# Patient Record
Sex: FEMALE | Race: WHITE | NOT HISPANIC OR LATINO | Employment: FULL TIME | URBAN - METROPOLITAN AREA
[De-identification: names, ages, dates, MRNs, and addresses within clinical notes are randomized per-mention and may not be internally consistent; named-entity substitution may affect disease eponyms.]

---

## 2017-05-16 ENCOUNTER — OFFICE VISIT (OUTPATIENT)
Dept: OBSTETRICS AND GYNECOLOGY | Facility: CLINIC | Age: 32
End: 2017-05-16
Payer: COMMERCIAL

## 2017-05-16 ENCOUNTER — PATIENT MESSAGE (OUTPATIENT)
Dept: OBSTETRICS AND GYNECOLOGY | Facility: CLINIC | Age: 32
End: 2017-05-16

## 2017-05-16 VITALS — WEIGHT: 128.75 LBS | SYSTOLIC BLOOD PRESSURE: 114 MMHG | BODY MASS INDEX: 22.1 KG/M2 | DIASTOLIC BLOOD PRESSURE: 70 MMHG

## 2017-05-16 DIAGNOSIS — Z34.00 GRAVIDA 1, CURRENTLY PREGNANT: ICD-10-CM

## 2017-05-16 DIAGNOSIS — Z3A.01 5 WEEKS GESTATION OF PREGNANCY: ICD-10-CM

## 2017-05-16 DIAGNOSIS — N92.6 MISSED PERIOD: Primary | ICD-10-CM

## 2017-05-16 DIAGNOSIS — Z32.01 POSITIVE URINE PREGNANCY TEST: ICD-10-CM

## 2017-05-16 LAB
B-HCG UR QL: POSITIVE
CTP QC/QA: YES

## 2017-05-16 PROCEDURE — 81025 URINE PREGNANCY TEST: CPT | Mod: QW,S$GLB,, | Performed by: NURSE PRACTITIONER

## 2017-05-16 PROCEDURE — 1160F RVW MEDS BY RX/DR IN RCRD: CPT | Mod: S$GLB,,, | Performed by: NURSE PRACTITIONER

## 2017-05-16 PROCEDURE — 99999 PR PBB SHADOW E&M-EST. PATIENT-LVL II: CPT | Mod: PBBFAC,,, | Performed by: NURSE PRACTITIONER

## 2017-05-16 PROCEDURE — 99213 OFFICE O/P EST LOW 20 MIN: CPT | Mod: S$GLB,,, | Performed by: NURSE PRACTITIONER

## 2017-05-16 NOTE — MR AVS SNAPSHOT
[facility]  [address], [address]  [facility] [address]  Phone: [phone]  Fax: [phone]                  Nallely Liang   2017 2:40 PM   Office Visit    Description:  Female : 1985   Provider:  Dorothea Lieberman NP   Department[facility]  [facility]           Reason for Visit     Absent Menses           Diagnoses this Visit        Comments    Missed period    -  Primary     Positive urine pregnancy test          1, currently pregnant         5 weeks gestation of pregnancy                To Do List           Future Appointments        Provider Department Dept Phone    2017 3:00 PM Talon Turcios Jr., MD [facility] [phone]    2017 2:30 PM [facility], WOMEN'S ULTRASOUND [facility] [phone]    2017 3:00 PM Lyssa Martinez MD [facility] [phone]      Goals (5 Years of Data)     None      Follow-Up and Disposition     Return for Dating US and OBMD.    Follow-up and Disposition History      [facility] On Call     [facility] On Call Nurse Care Line -  Assistance  Unless otherwise directed by your provider, please contact [facility] On-Call, our nurse care line that is available for  assistance.     Registered nurses in the [facility] On Call Center provide: appointment scheduling, clinical advisement, health education, and other advisory services.  Call: [phone] (toll free)               Medications           Message regarding Medications     Verify the changes and/or additions to your medication regime listed below are the same as discussed with your clinician today.  If any of these changes or additions are incorrect, please notify your healthcare provider.             Verify that the below list of medications is an accurate representation of the medications you are currently taking.  If none reported, the list may be blank. If incorrect, please contact your healthcare provider. Carry  this list with you in case of emergency.                Clinical Reference Information           Your Vitals Were     BP Weight Last Period BMI       114/70 58.4 kg (128 lb 12 oz) 2017 22.1 kg/m2       Blood Pressure          Most Recent Value    BP  114/70      Allergies as of 2017     No Known Allergies      Immunizations Administered on Date of Encounter - 2017     None      Orders Placed During Today's Visit      Normal Orders This Visit    POCT urine pregnancy          2017  2:53 PM - Ronda Aparicio MA      Component Results     Component Value Flag Ref Range Units Status    POC Preg Test, Ur Positive (A) Negative  Final     Acceptable Yes    Final            Instructions    Topic  General Pregnancy Information Recommended   (Unless Otherwise Contraindicated Or Restrictions Given To You By Your OB Doctor)      1. Anticipated course of prenatal care       Visits: will be Every 4 wks until 28 weeks, then every 2 weeks until 36 weeks, and then weekly until delivery.    Urine will be collected at each Obstetric visit        2. Nutrition and weight gain     Daily pre-eddie vitamin (recommend taking at night)    Additional 300 calories needed daily   No Sushi, hotdogs, unpasteurized products (milk/cheeses). No large fish such as: shark, jeremy mackerel, tile, sword fish    Incorporate 12 ounces of smaller seafood/week and no more than 6oz of albacore tuna    Caffeine: 200 mg/day or 2 cups of caffeine/day    Weight gain recommendations are based off of BMI before pregnancy. Generally patients who with normal weight prior pregnancy it is recommended 25-35 pounds of weight gain during the pregnancy with an estimated weekly gain of 1 pound/wk in 2nd and 3rd Trimester.    3. Toxoplasmosis precautions   If cats are in the home avoid changing litter boxes and if you need to change the litter box recommended you use gloves   4. Sexual Activity   Sexual activity is okay unless  you are put on restrictions by your provider. I recommend urinating after intercourse    5. Exercise   Generally pre-pregnancy routine is okay to continue    Drink plenty fluids for hydration    Stop any activity that causes heavy cramping like a period or bright red bleeding and contact your provider   No extreme or contact sports    No exercise on your back for an extended period of time after 20 weeks    6. Hot Tub/Saunas   Avoid hot tubes and saunas    7. Hair Treatments   Because of the lack of scientific studies on the effects of chemical treatments on your hair, we must advise that you do it at your own risk. If you choose to treat your hair, we recommend that you wait until after 12 weeks gestation. At this time there is no reason to believe that normal hair treatment is associated with onsequences to the baby.    8. Vaccines   Influenza vaccine is recommended by CDC during flu season    Tdap (pertussis or whopping cough) recommended each pregnancy between 27 and 36 weeks    Tdap booster recommended for family and other planned direct caregivers    9. Water   Water is an important nutrient in a good diet. However, it cannot be stressed enough that during pregnancy water is essential. The body has increased circulation through blood vessels, and without a large increase in water, pregnant women will be dehydrated. It plays an important role in decreasing constipation, preventing  contractions, decreasing swelling, and preventing dizziness. We recommend that you drink 8-10 glasses of water per day.    10. Smoking/Alcohol/Illicit Drug Use   No safe Level    Can lead to problems with pregnancy    Growth of the developing fetus     labor (delivery before 37 weeks)     rupture of the membranes (water breaking before 37 weeks)    Premature separation of the placenta (which may cause bleeding)    American College of Obstetricians and Gynecologists endorses abstinence    Can  lead to babies with disabilities    11. Environmental or work hazards   Unless otherwise restricted you may continue work throughout the pregnancy    Notify your provider of any work hazards or chemical exposure concerns   12. Travel     Safe to travel up to 35 weeks    Continue to wear a seatbelt and airbags are still recommended    Drink plenty fluids    Blood clots are a concern during pregnancy with long travel. Recommend compression stockings and moving around at least every 2 hours and staying hydrated.    13. Use of medications, vitamins, herbs, OTC drugs     Any medications not on the list provided to you from our clinic or given to you by one of our providers we recommend calling to make sure the medication is safe for you and baby.    14. Domestic Violence     Please notify office immediately of any concerns or violence so that we can help direct you to assistance needed    [place] [employer]: [phone]    15. Childbirth classes     List of Childbirth classes from [facility] is available    16. Selecting a Pediatrician   Selecting a pediatrician before delivery is recommended   You can interview pediatricians before delivery    17. Fetal Monitoring     A simple test of your babys well-being is a kick count. After 26 weeks, fetal motion of any kind should be monitored. Further discussion at that time   18.  Labor Signs     Water break, leaking fluids from Vagina prior 37 weeks   Regular contractions, Contractions that are more than 5-6/hour, getting stronger and painful with lower back pain, does not go away with rest and fluids    19. Postpartum Family Planning     Multiple options available from short term methods to long term reversible and irreversible methods    Discuss with provider as you get closer to delivery    20. Dental     It is recommended that you get an annual dental cleaning    21. Breastfeeding     Classes offered at [facility] and it  is recommended to take a class    22. Lifting  In 2013, the [employer] [place] [employer] Safety and Health (NIOSH) published clinical guidelines for occupational lifting in uncomplicated pregnancies. The recommended weight limits are based on gestational age, intermittent versus repetitive lifting, time (hours/day) spent lifting, and lifting height from floor and distance in 3 front of body. In this guideline, the maximum permissible weight for a woman less than 20 weeks of gestation performing infrequent lifting is 36 pounds (16 kgs) and the maximum permissible weight at ?20 weeks is 26 pounds (12 kgs). For repetitive lifting ?1 hour/day, the maximum weights in the first and second half of pregnancy are 18 pounds (8 kgs) and 13 pounds (6 kgs), respectively, and for repetitive lifting <1 hour/day, the maximum weights are 30 pounds (14 kgs) and 22 pounds (10 kgs), respectively. Although not based on high quality evidence, these guidelines are a reasonable reference for counseling pregnant women     23. Scheduling and Provider Availability     Your Obstetric Doctor is usually here weekly but not every day. We recommend you make 3-4 advanced appointments at a time to accommodate your personal needs and work/school obligations.    We ask that you come 15 minutes prior your scheduled appointment.    For same day appointments (not routine appointments) there is a Nurse Practitioner or another obstetric provider available. Please let the  aware you are an OB patient requesting a same day appointment.      24. Recommended Phone Dangelo     [facility]               Language Assistance Services     ATTENTION: Language assistance services are available, free of charge. Please call [phone].      ATENCIÓN: Si habla español, tiene a swanson disposición servicios gratuitos de asistencia lingüística. Llame al [phone].     CHÚ Ý: N?u b?n nói Ti?ng Vi?t, có các d?ch v? h? tr? ngôn ng? mi?n phí  dành cho b?n. G?i s? [phone].         [facility] complies with applicable Federal civil rights laws and does not discriminate on the basis of race, color, national origin, age, disability, or sex.

## 2017-05-16 NOTE — PATIENT INSTRUCTIONS
Topic  General Pregnancy Information Recommended   (Unless Otherwise Contraindicated Or Restrictions Given To You By Your OB Doctor)      1. Anticipated course of prenatal care       Visits: will be Every 4 wks until 28 weeks, then every 2 weeks until 36 weeks, and then weekly until delivery.    Urine will be collected at each Obstetric visit        2. Nutrition and weight gain     Daily pre-eddie vitamin (recommend taking at night)    Additional 300 calories needed daily   No Sushi, hotdogs, unpasteurized products (milk/cheeses). No large fish such as: shark, jeremy mackerel, tile, sword fish    Incorporate 12 ounces of smaller seafood/week and no more than 6oz of albacore tuna    Caffeine: 200 mg/day or 2 cups of caffeine/day    Weight gain recommendations are based off of BMI before pregnancy. Generally patients who with normal weight prior pregnancy it is recommended 25-35 pounds of weight gain during the pregnancy with an estimated weekly gain of 1 pound/wk in 2nd and 3rd Trimester.    3. Toxoplasmosis precautions   If cats are in the home avoid changing litter boxes and if you need to change the litter box recommended you use gloves   4. Sexual Activity   Sexual activity is okay unless you are put on restrictions by your provider. I recommend urinating after intercourse    5. Exercise   Generally pre-pregnancy routine is okay to continue    Drink plenty fluids for hydration    Stop any activity that causes heavy cramping like a period or bright red bleeding and contact your provider   No extreme or contact sports    No exercise on your back for an extended period of time after 20 weeks    6. Hot Tub/Saunas   Avoid hot tubes and saunas    7. Hair Treatments   Because of the lack of scientific studies on the effects of chemical treatments on your hair, we must advise that you do it at your own risk. If you choose to treat your hair, we recommend that you wait until after 12 weeks gestation. At  this time there is no reason to believe that normal hair treatment is associated with onsequences to the baby.    8. Vaccines   Influenza vaccine is recommended by CDC during flu season    Tdap (pertussis or whopping cough) recommended each pregnancy between 27 and 36 weeks    Tdap booster recommended for family and other planned direct caregivers    9. Water   Water is an important nutrient in a good diet. However, it cannot be stressed enough that during pregnancy water is essential. The body has increased circulation through blood vessels, and without a large increase in water, pregnant women will be dehydrated. It plays an important role in decreasing constipation, preventing  contractions, decreasing swelling, and preventing dizziness. We recommend that you drink 8-10 glasses of water per day.    10. Smoking/Alcohol/Illicit Drug Use   No safe Level    Can lead to problems with pregnancy    Growth of the developing fetus     labor (delivery before 37 weeks)     rupture of the membranes (water breaking before 37 weeks)    Premature separation of the placenta (which may cause bleeding)    American College of Obstetricians and Gynecologists endorses abstinence    Can lead to babies with disabilities    11. Environmental or work hazards   Unless otherwise restricted you may continue work throughout the pregnancy    Notify your provider of any work hazards or chemical exposure concerns   12. Travel     Safe to travel up to 35 weeks    Continue to wear a seatbelt and airbags are still recommended    Drink plenty fluids    Blood clots are a concern during pregnancy with long travel. Recommend compression stockings and moving around at least every 2 hours and staying hydrated.    13. Use of medications, vitamins, herbs, OTC drugs     Any medications not on the list provided to you from our clinic or given to you by one of our providers we recommend calling to make sure the  medication is safe for you and baby.    14. Domestic Violence     Please notify office immediately of any concerns or violence so that we can help direct you to assistance needed    [place] [employer]: [phone]    15. Childbirth classes     List of Childbirth classes from [facility] is available    16. Selecting a Pediatrician   Selecting a pediatrician before delivery is recommended   You can interview pediatricians before delivery    17. Fetal Monitoring     A simple test of your babys well-being is a kick count. After 26 weeks, fetal motion of any kind should be monitored. Further discussion at that time   18.  Labor Signs     Water break, leaking fluids from Vagina prior 37 weeks   Regular contractions, Contractions that are more than 5-6/hour, getting stronger and painful with lower back pain, does not go away with rest and fluids    19. Postpartum Family Planning     Multiple options available from short term methods to long term reversible and irreversible methods    Discuss with provider as you get closer to delivery    20. Dental     It is recommended that you get an annual dental cleaning    21. Breastfeeding     Classes offered at [facility] and it is recommended to take a class    22. Lifting  In 2013, the National [place] for Occupational Safety and Health (NIOSH) published clinical guidelines for occupational lifting in uncomplicated pregnancies. The recommended weight limits are based on gestational age, intermittent versus repetitive lifting, time (hours/day) spent lifting, and lifting height from floor and distance in 3 front of body. In this guideline, the maximum permissible weight for a woman less than 20 weeks of gestation performing infrequent lifting is 36 pounds (16 kgs) and the maximum permissible weight at ?20 weeks is 26 pounds (12 kgs). For repetitive lifting ?1 hour/day, the maximum weights in the first and second half of pregnancy are  18 pounds (8 kgs) and 13 pounds (6 kgs), respectively, and for repetitive lifting <1 hour/day, the maximum weights are 30 pounds (14 kgs) and 22 pounds (10 kgs), respectively. Although not based on high quality evidence, these guidelines are a reasonable reference for counseling pregnant women     23. Scheduling and Provider Availability     Your Obstetric Doctor is usually here weekly but not every day. We recommend you make 3-4 advanced appointments at a time to accommodate your personal needs and work/school obligations.    We ask that you come 15 minutes prior your scheduled appointment.    For same day appointments (not routine appointments) there is a Nurse Practitioner or another obstetric provider available. Please let the  aware you are an OB patient requesting a same day appointment.      24. Recommended Phone Dangelo     [place]    [facility]

## 2017-05-16 NOTE — PROGRESS NOTES
Chief Complaint: Missed Period    HPI: Nallely Liang is a 31 y.o., , reporting absence of menses with  LMP of 4.7.. Patient and partner have been attempting pregnancy. She currently denies any vaginal bleeding, leaking fluids, abnormal vaginal discharge,  or GI complaints. No fetal movement detected at this time. She is currently taking a daily prenatal vitamin and no other changes in medications reported at this time. No chronic medical history reported, denies ACOG recommended genetic screening history for FOB. + for MGM with SABs times 4    Infection History:  Denies TB exposure, STD history, rash since LMP, h/o HSV for pt or partner  Vaccine History:  Last Flu vaccine not UTD Last Tdap unsure Childhood Vaccines were UTD    History reviewed. No pertinent past medical history.  Past Surgical History:   Procedure Laterality Date    ANTERIOR CRUCIATE LIGAMENT REPAIR Left 2009    left acl replacement 2009     Social History   Substance Use Topics    Smoking status: Never Smoker    Smokeless tobacco: None    Alcohol use No      Comment: per week     Family History   Problem Relation Age of Onset    Miscarriages / Stillbirths Maternal Grandmother     Stroke Maternal Grandfather     Heart attack Maternal Grandfather     No Known Problems Father     No Known Problems Mother     No Known Problems Brother     No Known Problems Paternal Grandmother     Heart attack Paternal Grandfather     No Known Problems Brother     No Known Problems Brother     Amblyopia Neg Hx     Blindness Neg Hx     Cataracts Neg Hx     Glaucoma Neg Hx     Macular degeneration Neg Hx     Strabismus Neg Hx     Retinal detachment Neg Hx     Breast cancer Neg Hx     Colon cancer Neg Hx     Diabetes Neg Hx     Eclampsia Neg Hx     Hypertension Neg Hx     Ovarian cancer Neg Hx      labor Neg Hx      OB History    Para Term  AB SAB TAB Ectopic Multiple Living   1 0 0 0 0 0 0 0 0 0      #  Outcome Date GA Lbr Christophe/2nd Weight Sex Delivery Anes PTL Lv   1 Current               Obstetric Comments   Menarche ~ 14/[birth year]       /70  Wt 58.4 kg (128 lb 12 oz)  LMP 04/07/2017  BMI 22.1 kg/m2    ROS   Systemic: Not feeling tired (fatigue).  No fever chills   Gastrointestinal: No nausea, vomiting, no abdominal pain.  No diarrhea.  Genitourinary: No dysuria. No Pelvic Pain  Skin: No rash.      Physical Exam:   Vital Signs:° Normal.  General Appearance:° Well developed.  ° Well nourished.  Neurological:° No disorientation was observed.  Psychiatric: Affect: ° Normal.    Assessment/Plan  Confirmation of pregnancy--UPT in office positive, with pts LMP patient is approximately  5wks 4 days gestation with ONESIMO 1.12.18. Ordered dating Ultrasound and apt with OBMD. Start/Continue daily prenatal vitamin. Precautions given and s/s to report back to the office discussed with patient. First T ACOG education discussed today and handout provided. Zika precautions discussed.    2. Travel--discussed travel. She will talk with OBMD about travel plans    RTC prn as schedule with OBMD     ~25 minutes spent with pt Face to Face with >50% of visit spent on education/counseling

## 2017-05-17 ENCOUNTER — TELEPHONE (OUTPATIENT)
Dept: OBSTETRICS AND GYNECOLOGY | Facility: CLINIC | Age: 32
End: 2017-05-17

## 2017-05-17 DIAGNOSIS — N91.2 AMENORRHEA: Primary | ICD-10-CM

## 2017-05-23 ENCOUNTER — OFFICE VISIT (OUTPATIENT)
Dept: FAMILY MEDICINE | Facility: CLINIC | Age: 32
End: 2017-05-23
Attending: FAMILY MEDICINE
Payer: COMMERCIAL

## 2017-05-23 VITALS
WEIGHT: 129.5 LBS | HEIGHT: 64 IN | OXYGEN SATURATION: 99 % | HEART RATE: 78 BPM | DIASTOLIC BLOOD PRESSURE: 60 MMHG | SYSTOLIC BLOOD PRESSURE: 104 MMHG | BODY MASS INDEX: 22.11 KG/M2

## 2017-05-23 DIAGNOSIS — Z00.00 ROUTINE GENERAL MEDICAL EXAMINATION AT A HEALTH CARE FACILITY: Primary | ICD-10-CM

## 2017-05-23 DIAGNOSIS — Z00.00 LABORATORY EXAM ORDERED AS PART OF ROUTINE GENERAL MEDICAL EXAMINATION: ICD-10-CM

## 2017-05-23 DIAGNOSIS — Z34.90 INTRAUTERINE PREGNANCY: ICD-10-CM

## 2017-05-23 PROCEDURE — 99385 PREV VISIT NEW AGE 18-39: CPT | Mod: S$GLB,,, | Performed by: FAMILY MEDICINE

## 2017-05-23 PROCEDURE — 99999 PR PBB SHADOW E&M-EST. PATIENT-LVL III: CPT | Mod: PBBFAC,,, | Performed by: FAMILY MEDICINE

## 2017-05-23 NOTE — PROGRESS NOTES
Subjective:       Patient ID: Nallely Liang is a 31 y.o. female.    Chief Complaint: Annual Exam    HPI     The patient presents to the office today requesting a routine periodic health examination.  This is her first visit with me.  She states she is 6 weeks pregnant (LMP 4/7, EDC 1/12)    Patient Active Problem List   Diagnosis    Intrauterine pregnancy       Past Surgical History:   Procedure Laterality Date    ANTERIOR CRUCIATE LIGAMENT REPAIR Left 2009       No current outpatient prescriptions on file.    Review of patient's allergies indicates:  No Known Allergies    Family History   Problem Relation Age of Onset    Miscarriages / Stillbirths Maternal Grandmother     Stroke Maternal Grandfather     Heart attack Maternal Grandfather     No Known Problems Father     No Known Problems Mother     No Known Problems Brother     No Known Problems Paternal Grandmother     Heart attack Paternal Grandfather     No Known Problems Brother     No Known Problems Brother        Social History     Social History    Marital status:      Spouse name: N/A    Number of children: 0    Years of education: N/A     Occupational History    nutrition research      [employer]     Social History Main Topics    Smoking status: Never Smoker    Smokeless tobacco: Not on file    Alcohol use 2.4 oz/week     4 Standard drinks or equivalent per week    Drug use: No    Sexual activity: Yes     Partners: Male     Birth control/ protection: None     Other Topics Concern    Not on file     Social History Narrative    The patient does exercise regularly (BIW-TIW: dance classes, yoga).      She is satisfied with weight.    Rates diet as good.    She does not drink at least 1/2 gallon water daily.    She drinks 2 coffee/tea/caffeine-containing soft drinks daily.    Total sleep time at night is 7-9 hours.    She works 40-45 hours per week.    She does wear seat belts.    Hobbies include sialing.            OB History      Para Term  AB Living    1 0 0 0 0 0    SAB TAB Ectopic Multiple Live Births    0 0 0 0         Obstetric Comments    Menarche age 14.   Menses normal and regular.  History of abnormal PAP smear: NO.  History of sexually transmitted disease:  NO            Patient Care Team:  Talon Turcios Jr., MD as PCP - General (Family Medicine)    Review of Systems   Constitutional: Negative for activity change and unexpected weight change.   HENT: Negative for hearing loss, rhinorrhea and trouble swallowing.    Eyes: Negative for discharge and visual disturbance.   Respiratory: Negative for chest tightness and wheezing.    Cardiovascular: Negative for chest pain and palpitations.   Gastrointestinal: Negative for blood in stool, constipation, diarrhea and vomiting.   Endocrine: Negative for polydipsia and polyuria.   Genitourinary: Negative for difficulty urinating, dysuria, hematuria and menstrual problem.   Musculoskeletal: Negative for arthralgias and joint swelling.   Neurological: Negative for weakness and headaches.   Psychiatric/Behavioral: Negative for confusion and dysphoric mood.         Objective:      Physical Exam   Constitutional: She is oriented to person, place, and time. She appears well-developed and well-nourished. She is cooperative.   HENT:   Head: Normocephalic and atraumatic.   Nose: Nose normal.   Mouth/Throat: Oropharynx is clear and moist and mucous membranes are normal.   Eyes: Conjunctivae are normal. No scleral icterus.   Neck: Neck supple. No JVD present. Carotid bruit is not present. No thyromegaly present.   Cardiovascular: Normal rate, regular rhythm, normal heart sounds and normal pulses.  Exam reveals no gallop and no friction rub.    No murmur heard.  Pulmonary/Chest: Effort normal and breath sounds normal. She has no wheezes. She has no rhonchi. She has no rales.   Abdominal: Soft. Bowel sounds are normal. She exhibits no distension and no mass. There  "is no splenomegaly or hepatomegaly. There is no tenderness.   Musculoskeletal: Normal range of motion. She exhibits no edema or tenderness.   Lymphadenopathy:     She has no cervical adenopathy.     She has no axillary adenopathy.   Neurological: She is alert and oriented to person, place, and time. She has normal strength and normal reflexes. No cranial nerve deficit or sensory deficit.   Skin: Skin is warm and dry.   Psychiatric: She has a normal mood and affect. Her speech is normal.   Vitals reviewed.        Assessment:       1. Routine general medical examination at a health care facility    2. Intrauterine pregnancy    3. Laboratory exam ordered as part of routine general medical examination          Plan:       Labs (see Orders: will wait until first OB visit).  Discussed with patient the importance of lifestyle modifications, including well-balanced diet and moderate exercise regimen, in reducing risk for cardiovascular/cerebrovascular disease and diabetes.  Discussed routine examinations and screenings.  We will call the patient with results & make further recommendations at that time.      "This note will not be shared with the patient."  "

## 2017-05-31 ENCOUNTER — PROCEDURE VISIT (OUTPATIENT)
Dept: OBSTETRICS AND GYNECOLOGY | Facility: CLINIC | Age: 32
End: 2017-05-31
Payer: COMMERCIAL

## 2017-05-31 ENCOUNTER — INITIAL PRENATAL (OUTPATIENT)
Dept: OBSTETRICS AND GYNECOLOGY | Facility: CLINIC | Age: 32
End: 2017-05-31
Payer: COMMERCIAL

## 2017-05-31 ENCOUNTER — LAB VISIT (OUTPATIENT)
Dept: LAB | Facility: HOSPITAL | Age: 32
End: 2017-05-31
Attending: OBSTETRICS & GYNECOLOGY
Payer: COMMERCIAL

## 2017-05-31 VITALS — DIASTOLIC BLOOD PRESSURE: 62 MMHG | WEIGHT: 129 LBS | SYSTOLIC BLOOD PRESSURE: 106 MMHG | BODY MASS INDEX: 22.14 KG/M2

## 2017-05-31 DIAGNOSIS — Z36.89 ESTABLISH GESTATIONAL AGE, ULTRASOUND: ICD-10-CM

## 2017-05-31 DIAGNOSIS — Z00.00 LABORATORY EXAM ORDERED AS PART OF ROUTINE GENERAL MEDICAL EXAMINATION: ICD-10-CM

## 2017-05-31 DIAGNOSIS — Z34.91 NORMAL PREGNANCY, FIRST TRIMESTER: Primary | ICD-10-CM

## 2017-05-31 DIAGNOSIS — Z34.90 INTRAUTERINE PREGNANCY: ICD-10-CM

## 2017-05-31 DIAGNOSIS — Z3A.01 6 WEEKS GESTATION OF PREGNANCY: ICD-10-CM

## 2017-05-31 DIAGNOSIS — N91.2 AMENORRHEA: ICD-10-CM

## 2017-05-31 DIAGNOSIS — Z34.91 NORMAL PREGNANCY, FIRST TRIMESTER: ICD-10-CM

## 2017-05-31 LAB
ABO + RH BLD: NORMAL
ALBUMIN SERPL BCP-MCNC: 4.2 G/DL
ALP SERPL-CCNC: 37 U/L
ALT SERPL W/O P-5'-P-CCNC: 12 U/L
ANION GAP SERPL CALC-SCNC: 8 MMOL/L
AST SERPL-CCNC: 17 U/L
BASOPHILS # BLD AUTO: 0.04 K/UL
BASOPHILS NFR BLD: 0.5 %
BILIRUB SERPL-MCNC: 0.3 MG/DL
BLD GP AB SCN CELLS X3 SERPL QL: NORMAL
BUN SERPL-MCNC: 8 MG/DL
CALCIUM SERPL-MCNC: 9.5 MG/DL
CHLORIDE SERPL-SCNC: 106 MMOL/L
CO2 SERPL-SCNC: 23 MMOL/L
CREAT SERPL-MCNC: 0.8 MG/DL
DIFFERENTIAL METHOD: ABNORMAL
EOSINOPHIL # BLD AUTO: 0.1 K/UL
EOSINOPHIL NFR BLD: 0.7 %
ERYTHROCYTE [DISTWIDTH] IN BLOOD BY AUTOMATED COUNT: 13.5 %
EST. GFR  (AFRICAN AMERICAN): >60 ML/MIN/1.73 M^2
EST. GFR  (NON AFRICAN AMERICAN): >60 ML/MIN/1.73 M^2
GLUCOSE SERPL-MCNC: 77 MG/DL
HCT VFR BLD AUTO: 37.4 %
HGB BLD-MCNC: 12.8 G/DL
LYMPHOCYTES # BLD AUTO: 1.7 K/UL
LYMPHOCYTES NFR BLD: 19.9 %
MCH RBC QN AUTO: 28.1 PG
MCHC RBC AUTO-ENTMCNC: 34.2 %
MCV RBC AUTO: 82 FL
MONOCYTES # BLD AUTO: 0.5 K/UL
MONOCYTES NFR BLD: 5.7 %
NEUTROPHILS # BLD AUTO: 6.3 K/UL
NEUTROPHILS NFR BLD: 73.1 %
PLATELET # BLD AUTO: 269 K/UL
PMV BLD AUTO: 11 FL
POTASSIUM SERPL-SCNC: 3.9 MMOL/L
PROT SERPL-MCNC: 7.6 G/DL
RBC # BLD AUTO: 4.56 M/UL
RH BLD: NORMAL
SODIUM SERPL-SCNC: 137 MMOL/L
T4 FREE SERPL-MCNC: 0.9 NG/DL
TSH SERPL DL<=0.005 MIU/L-ACNC: 2.2 UIU/ML
WBC # BLD AUTO: 8.58 K/UL

## 2017-05-31 PROCEDURE — 80053 COMPREHEN METABOLIC PANEL: CPT

## 2017-05-31 PROCEDURE — 85025 COMPLETE CBC W/AUTO DIFF WBC: CPT

## 2017-05-31 PROCEDURE — 0500F INITIAL PRENATAL CARE VISIT: CPT | Mod: S$GLB,,, | Performed by: OBSTETRICS & GYNECOLOGY

## 2017-05-31 PROCEDURE — 86901 BLOOD TYPING SEROLOGIC RH(D): CPT | Mod: 91

## 2017-05-31 PROCEDURE — 87591 N.GONORRHOEAE DNA AMP PROB: CPT

## 2017-05-31 PROCEDURE — 86703 HIV-1/HIV-2 1 RESULT ANTBDY: CPT

## 2017-05-31 PROCEDURE — 87086 URINE CULTURE/COLONY COUNT: CPT

## 2017-05-31 PROCEDURE — 99999 PR PBB SHADOW E&M-EST. PATIENT-LVL II: CPT | Mod: PBBFAC,,, | Performed by: OBSTETRICS & GYNECOLOGY

## 2017-05-31 PROCEDURE — 84439 ASSAY OF FREE THYROXINE: CPT

## 2017-05-31 PROCEDURE — 81220 CFTR GENE COM VARIANTS: CPT

## 2017-05-31 PROCEDURE — 86900 BLOOD TYPING SEROLOGIC ABO: CPT

## 2017-05-31 PROCEDURE — 84443 ASSAY THYROID STIM HORMONE: CPT

## 2017-05-31 PROCEDURE — 86762 RUBELLA ANTIBODY: CPT

## 2017-05-31 PROCEDURE — 76817 TRANSVAGINAL US OBSTETRIC: CPT | Mod: S$GLB,,, | Performed by: OBSTETRICS & GYNECOLOGY

## 2017-05-31 PROCEDURE — 86901 BLOOD TYPING SEROLOGIC RH(D): CPT

## 2017-05-31 PROCEDURE — 86592 SYPHILIS TEST NON-TREP QUAL: CPT

## 2017-05-31 PROCEDURE — 87340 HEPATITIS B SURFACE AG IA: CPT

## 2017-05-31 NOTE — PROGRESS NOTES
6w6d with c/o mild nausea. No vomiting.   Genetic screening discussed. Patient and partner will decide for next visit.   GC collected today and PNL drawn.   RTC in 4 weeks for routine PNC.

## 2017-05-31 NOTE — PROCEDURES
Procedures   Obstetrical ultrasound completed today.  See report in imaging section of [facility].

## 2017-06-01 LAB
BACTERIA UR CULT: NORMAL
C TRACH DNA SPEC QL NAA+PROBE: NOT DETECTED
HBV SURFACE AG SERPL QL IA: NEGATIVE
HIV 1+2 AB+HIV1 P24 AG SERPL QL IA: NEGATIVE
N GONORRHOEA DNA SPEC QL NAA+PROBE: NOT DETECTED
RPR SER QL: NORMAL
RUBV IGG SER-ACNC: 43.9 IU/ML
RUBV IGG SER-IMP: REACTIVE

## 2017-06-02 ENCOUNTER — PATIENT MESSAGE (OUTPATIENT)
Dept: FAMILY MEDICINE | Facility: CLINIC | Age: 32
End: 2017-06-02

## 2017-06-02 LAB — CFTR MUT ANL BLD/T: NORMAL

## 2017-06-22 ENCOUNTER — TELEPHONE (OUTPATIENT)
Dept: OBSTETRICS AND GYNECOLOGY | Facility: CLINIC | Age: 32
End: 2017-06-22

## 2017-06-22 NOTE — TELEPHONE ENCOUNTER
10 week ob  Has a rash on her legs that has gotten worse recently.  She has an appt with a derm next week.  She is asking if she can use an antifungal cream on the rash; if so, which do you recommend?  Offered her an appt, declined.

## 2017-06-22 NOTE — TELEPHONE ENCOUNTER
Juan chadwick pt - pt is 10 weeks pregnant and has a rash on her legs. She said she has had it for a while but it has gotten worse recently. She has a derm appt next week but thinks it is a fungal rash. Pt would like to know if she can use an antifungal cream and if so which one does  recommend.

## 2017-06-23 RX ORDER — NYSTATIN AND TRIAMCINOLONE ACETONIDE 100000; 1 [USP'U]/G; MG/G
CREAM TOPICAL
Qty: 30 G | Refills: 1 | Status: SHIPPED | OUTPATIENT
Start: 2017-06-23 | End: 2017-10-31

## 2017-06-23 NOTE — TELEPHONE ENCOUNTER
Called and advised pt of below message.    MD Deidra Mercado, RN 1 hour ago (12:06 PM)     Deidra,   Please call Melvi and let her know that benadryl is safe to take in pregnancy for the itch, cold compresses can sometimes be helpful, and I've sent in an antifungal cream for her which is safe in pregnancy. It's important for her to keep her derm appointment though.   Thanks! (Routing comment)

## 2017-06-30 ENCOUNTER — ROUTINE PRENATAL (OUTPATIENT)
Dept: OBSTETRICS AND GYNECOLOGY | Facility: CLINIC | Age: 32
End: 2017-06-30
Payer: COMMERCIAL

## 2017-06-30 VITALS
WEIGHT: 130.75 LBS | DIASTOLIC BLOOD PRESSURE: 66 MMHG | SYSTOLIC BLOOD PRESSURE: 112 MMHG | BODY MASS INDEX: 22.44 KG/M2

## 2017-06-30 DIAGNOSIS — Z34.91 NORMAL PREGNANCY, FIRST TRIMESTER: Primary | ICD-10-CM

## 2017-06-30 DIAGNOSIS — Z67.91 RH NEGATIVE STATE IN ANTEPARTUM PERIOD, FIRST TRIMESTER: ICD-10-CM

## 2017-06-30 DIAGNOSIS — Z3A.11 11 WEEKS GESTATION OF PREGNANCY: ICD-10-CM

## 2017-06-30 DIAGNOSIS — O26.891 RH NEGATIVE STATE IN ANTEPARTUM PERIOD, FIRST TRIMESTER: ICD-10-CM

## 2017-06-30 PROCEDURE — 99999 PR PBB SHADOW E&M-EST. PATIENT-LVL II: CPT | Mod: PBBFAC,,, | Performed by: OBSTETRICS & GYNECOLOGY

## 2017-06-30 PROCEDURE — 0502F SUBSEQUENT PRENATAL CARE: CPT | Mod: S$GLB,,, | Performed by: OBSTETRICS & GYNECOLOGY

## 2017-06-30 NOTE — PROGRESS NOTES
11w1d without complaints. Reviewed labs.  Would like quad screen.   Signed up for [facility].   RTC in 4 weeks for routine PNC.

## 2017-07-03 ENCOUNTER — PATIENT OUTREACH (OUTPATIENT)
Dept: OTHER | Facility: OTHER | Age: 32
End: 2017-07-03

## 2017-07-03 NOTE — TELEPHONE ENCOUNTER
Called to introduce ms. Liang to the Connected MOM program. LVM requesting call back, otherwise will try again in a couple of days.

## 2017-07-07 NOTE — TELEPHONE ENCOUNTER
Left second voicemail at number listed requesting call back for introduction. Will also send Intro letter via [employer] w/contact information.

## 2017-07-28 ENCOUNTER — ROUTINE PRENATAL (OUTPATIENT)
Dept: OBSTETRICS AND GYNECOLOGY | Facility: CLINIC | Age: 32
End: 2017-07-28
Payer: COMMERCIAL

## 2017-07-28 VITALS — SYSTOLIC BLOOD PRESSURE: 96 MMHG | BODY MASS INDEX: 22.89 KG/M2 | DIASTOLIC BLOOD PRESSURE: 64 MMHG | WEIGHT: 133.38 LBS

## 2017-07-28 DIAGNOSIS — Z67.91 RH NEGATIVE STATE IN ANTEPARTUM PERIOD, SECOND TRIMESTER: Primary | ICD-10-CM

## 2017-07-28 DIAGNOSIS — O26.892 RH NEGATIVE STATE IN ANTEPARTUM PERIOD, SECOND TRIMESTER: Primary | ICD-10-CM

## 2017-07-28 DIAGNOSIS — Z3A.15 15 WEEKS GESTATION OF PREGNANCY: ICD-10-CM

## 2017-07-28 PROCEDURE — 0502F SUBSEQUENT PRENATAL CARE: CPT | Mod: S$GLB,,, | Performed by: OBSTETRICS & GYNECOLOGY

## 2017-07-28 PROCEDURE — 99999 PR PBB SHADOW E&M-EST. PATIENT-LVL II: CPT | Mod: PBBFAC,,, | Performed by: OBSTETRICS & GYNECOLOGY

## 2017-07-28 RX ORDER — CLOTRIMAZOLE 1 %
CREAM (GRAM) TOPICAL
Refills: 1 | COMMUNITY
Start: 2017-07-14 | End: 2017-11-28

## 2017-07-28 NOTE — PROGRESS NOTES
15w1d without major complaints.   Quad screen to be drawn in 1 week.  Anatomy scan ordered.  RTC in 4 weeks for routine PNC.

## 2017-08-10 ENCOUNTER — LAB VISIT (OUTPATIENT)
Dept: LAB | Facility: OTHER | Age: 32
End: 2017-08-10
Attending: OBSTETRICS & GYNECOLOGY
Payer: COMMERCIAL

## 2017-08-10 DIAGNOSIS — Z3A.15 15 WEEKS GESTATION OF PREGNANCY: ICD-10-CM

## 2017-08-10 PROCEDURE — 81511 FTL CGEN ABNOR FOUR ANAL: CPT

## 2017-08-10 PROCEDURE — 36415 COLL VENOUS BLD VENIPUNCTURE: CPT

## 2017-08-14 LAB
# FETUSES US: NORMAL
2ND TRIMESTER 4 SCREEN PNL SERPL: NEGATIVE
2ND TRIMESTER 4 SCREEN SERPL-IMP: NORMAL
AFP MOM SERPL: 0.96
AFP SERPL-MCNC: 38.2 NG/ML
AGE AT DELIVERY: 31
B-HCG MOM SERPL: 1.49
B-HCG SERPL-ACNC: 45.4 IU/ML
FET TS 21 RISK FROM MAT AGE: NORMAL
GA (DAYS): 0 D
GA (WEEKS): 17 WK
GA METHOD: NORMAL
IDDM PATIENT QL: NORMAL
INHIBIN A MOM SERPL: 1.25
INHIBIN A SERPL-MCNC: 217.7 PG/ML
SMOKING STATUS FTND: NORMAL
TS 18 RISK FETUS: NORMAL
TS 21 RISK FETUS: NORMAL
U ESTRIOL MOM SERPL: 0.89
U ESTRIOL SERPL-MCNC: 0.98 NG/ML

## 2017-08-15 ENCOUNTER — PATIENT MESSAGE (OUTPATIENT)
Dept: OBSTETRICS AND GYNECOLOGY | Facility: CLINIC | Age: 32
End: 2017-08-15

## 2017-08-24 ENCOUNTER — OFFICE VISIT (OUTPATIENT)
Dept: MATERNAL FETAL MEDICINE | Facility: CLINIC | Age: 32
End: 2017-08-24
Attending: OBSTETRICS & GYNECOLOGY
Payer: COMMERCIAL

## 2017-08-24 DIAGNOSIS — Z36.3 ENCOUNTER FOR ROUTINE SCREENING FOR MALFORMATION USING ULTRASONICS: ICD-10-CM

## 2017-08-24 DIAGNOSIS — Z3A.15 15 WEEKS GESTATION OF PREGNANCY: ICD-10-CM

## 2017-08-24 DIAGNOSIS — Z3A.19 19 WEEKS GESTATION OF PREGNANCY: ICD-10-CM

## 2017-08-24 PROCEDURE — 99499 UNLISTED E&M SERVICE: CPT | Mod: S$GLB,,, | Performed by: OBSTETRICS & GYNECOLOGY

## 2017-08-24 PROCEDURE — 76805 OB US >/= 14 WKS SNGL FETUS: CPT | Mod: S$GLB,,, | Performed by: OBSTETRICS & GYNECOLOGY

## 2017-08-24 NOTE — LETTER
August 24, 2017      Lyssa Martinez MD  [address]  [address]  [facility] [address]           [facility]  [address] [address]  Phone: [phone]          Patient: Nallely Liang   MR Number: [id number]   YOB: 1985   Date of Visit: 8/24/2017       Dear Dr. Lyssa Martinez:    Thank you for referring Nallely Liang to me for evaluation. Attached you will find relevant portions of my assessment and plan of care.    If you have questions, please do not hesitate to call me. I look forward to following Nallely Liang along with you.    Sincerely,    Angeles Alejandro MD    Enclosure  CC:  No Recipients    If you would like to receive this communication electronically, please contact [email or web address] or [phone] to request more information on [employer] access.    For providers and/or their staff who would like to refer a patient to [facility], please contact us through our one-stop-shop provider referral line, [facility], at [phone].    If you feel you have received this communication in error or would no longer like to receive these types of communications, please e-mail [email or web address]

## 2017-08-24 NOTE — PROGRESS NOTES
Indication  ========    Fetal anatomy survey (CESAR HUFF).    History  ======    General History  Other: CESAR HUFF  Previous Outcomes   1  Para 0    Method  ======    Transabdominal ultrasound examination. View: Good view.    Pregnancy  =========    Reyes pregnancy. Number of fetuses: 1.    Dating  ======    Ultrasound examination on: 2017  GA by U/S based upon: AC, BPD, Femur, HC  GA by U/S 19 w + 5 d  ONESIMO by U/S: 2018  Assigned: Dating performed on 2017, based on ultrasound (CRL)  Assigned GA 19 w + 0 d  Assigned ONESIMO: 2018    General Evaluation  ==============    Cardiac activity: present.  bpm.  Fetal movements: visualized.  Presentation: cephalic.  Placenta:  Placental site: anterior.  Umbilical cord: Cord vessels: 3 vessel cord.  Amniotic fluid: Amount of AF: normal amount.    Fetal Biometry  ============    Fetal Biometry  BPD 46.3 mm 20w 0d Hadlock  OFD 56.5 mm 19w 6d Delbert  .8 mm 19w 3d Hadlock  .3 mm 20w 1d Hadlock  Femur 30.3 mm 19w 3d Hadlock  Cerebellum tr 19.2 mm 19w 2d Zavala  CM 3.4 mm  Humerus 29.1 mm 19w 3d Delbert   g  Calculated by: Hadlock (BPD-HC-AC-FL)  EFW (lb) 0 lb  EFW (oz) 11 oz  Cephalic index 0.82  HC / AC 1.12  FL / BPD 0.65  FL / AC 0.20   bpm  Head / Face / Neck   7.1 mm    Fetal Anatomy  ============    Cranium: normal  Lateral ventricles: normal  Choroid plexus: normal  Midline falx: normal  Cavum septi pellucidi: normal  Cerebellum: normal  Cisterna magna: normal  Lips: normal  Profile: normal  Nose: normal  RVOT: normal  LVOT: normal  4-chamber view: 4-chamber normal, septum normal  Situs: normal  Aortic arch: normal  Ductal arch: normal  SVC: normal  IVC: normal  3-vessel view: normal  Cord insertion: normal  Stomach: normal  Kidneys: normal  Bladder: normal  Genitals: normal  Cervical spine: normal  Thoracic spine: normal  Lumbar spine: normal  Sacral spine: normal  Arms: both  visualized  Legs: both visualized  Rt upper arm: normal  Rt forearm: normal  Rt hand: visualized  Rt hand: open  Lt upper arm: normal  Lt forearm: normal  Lt hand: visualized  Lt hand: open  Rt upper leg: normal  Rt lower leg: normal  Rt foot: visualized  Rt foot: plantar surface wnl  Lt upper leg: normal  Lt lower leg: normal  Lt foot: visualized  Lt foot: plantar surface wnl  Wants to know gender: no    Maternal Structures  ===============    Uterus / Cervix  Cervical length 41.7 mm  Ovaries / Tubes / Adnexa  Rt ovary: Visualized  Rt ovarian corpus luteum: visualized  Lt ovary: Visualized    Impression  =========    1. 19 wks garcia intrauterine pregnancy  2. Fetal biometry c/w dates  3. No fetal structural abnormalities appreciated  4. Amniotic fluid volume wnl per qualitative assessment .    Recommendation  ==============    follow up sono as clinically indicated.

## 2017-09-01 ENCOUNTER — PATIENT MESSAGE (OUTPATIENT)
Dept: ADMINISTRATIVE | Facility: OTHER | Age: 32
End: 2017-09-01

## 2017-09-08 ENCOUNTER — PATIENT MESSAGE (OUTPATIENT)
Dept: OBSTETRICS AND GYNECOLOGY | Facility: CLINIC | Age: 32
End: 2017-09-08

## 2017-09-28 NOTE — PROGRESS NOTES
24w1d without major complaints.   1 hr GCT and CBC ordered today.   RTC in 4 weeks.   Flu shot and Tdap at return visit.

## 2017-09-29 ENCOUNTER — ROUTINE PRENATAL (OUTPATIENT)
Dept: OBSTETRICS AND GYNECOLOGY | Facility: CLINIC | Age: 32
End: 2017-09-29
Payer: COMMERCIAL

## 2017-09-29 VITALS
WEIGHT: 138.44 LBS | DIASTOLIC BLOOD PRESSURE: 68 MMHG | SYSTOLIC BLOOD PRESSURE: 102 MMHG | BODY MASS INDEX: 23.76 KG/M2

## 2017-09-29 DIAGNOSIS — O26.892 RH NEGATIVE STATE IN ANTEPARTUM PERIOD, SECOND TRIMESTER: Primary | ICD-10-CM

## 2017-09-29 DIAGNOSIS — Z67.91 RH NEGATIVE STATE IN ANTEPARTUM PERIOD, SECOND TRIMESTER: Primary | ICD-10-CM

## 2017-09-29 DIAGNOSIS — Z3A.24 24 WEEKS GESTATION OF PREGNANCY: ICD-10-CM

## 2017-09-29 PROCEDURE — 99999 PR PBB SHADOW E&M-EST. PATIENT-LVL II: CPT | Mod: PBBFAC,,, | Performed by: OBSTETRICS & GYNECOLOGY

## 2017-09-29 PROCEDURE — 0502F SUBSEQUENT PRENATAL CARE: CPT | Mod: S$GLB,,, | Performed by: OBSTETRICS & GYNECOLOGY

## 2017-10-11 ENCOUNTER — LAB VISIT (OUTPATIENT)
Dept: LAB | Facility: OTHER | Age: 32
End: 2017-10-11
Attending: OBSTETRICS & GYNECOLOGY
Payer: COMMERCIAL

## 2017-10-11 ENCOUNTER — PATIENT MESSAGE (OUTPATIENT)
Dept: OBSTETRICS AND GYNECOLOGY | Facility: CLINIC | Age: 32
End: 2017-10-11

## 2017-10-11 ENCOUNTER — IMMUNIZATION (OUTPATIENT)
Dept: PERINATAL CARE | Facility: OTHER | Age: 32
End: 2017-10-11
Payer: COMMERCIAL

## 2017-10-11 DIAGNOSIS — Z3A.24 24 WEEKS GESTATION OF PREGNANCY: ICD-10-CM

## 2017-10-11 LAB
BASOPHILS # BLD AUTO: 0.02 K/UL
BASOPHILS NFR BLD: 0.2 %
DIFFERENTIAL METHOD: ABNORMAL
EOSINOPHIL # BLD AUTO: 0.1 K/UL
EOSINOPHIL NFR BLD: 0.8 %
ERYTHROCYTE [DISTWIDTH] IN BLOOD BY AUTOMATED COUNT: 13.1 %
GLUCOSE SERPL-MCNC: 83 MG/DL
HCT VFR BLD AUTO: 34.6 %
HGB BLD-MCNC: 11.4 G/DL
LYMPHOCYTES # BLD AUTO: 1.5 K/UL
LYMPHOCYTES NFR BLD: 14.3 %
MCH RBC QN AUTO: 28.6 PG
MCHC RBC AUTO-ENTMCNC: 32.9 G/DL
MCV RBC AUTO: 87 FL
MONOCYTES # BLD AUTO: 0.6 K/UL
MONOCYTES NFR BLD: 5.3 %
NEUTROPHILS # BLD AUTO: 8.4 K/UL
NEUTROPHILS NFR BLD: 79.2 %
PLATELET # BLD AUTO: 239 K/UL
PMV BLD AUTO: 11.5 FL
RBC # BLD AUTO: 3.98 M/UL
WBC # BLD AUTO: 10.64 K/UL

## 2017-10-11 PROCEDURE — 82950 GLUCOSE TEST: CPT

## 2017-10-11 PROCEDURE — 85025 COMPLETE CBC W/AUTO DIFF WBC: CPT

## 2017-10-11 PROCEDURE — 90471 IMMUNIZATION ADMIN: CPT | Mod: ,,, | Performed by: OBSTETRICS & GYNECOLOGY

## 2017-10-11 PROCEDURE — 90686 IIV4 VACC NO PRSV 0.5 ML IM: CPT | Mod: ,,, | Performed by: OBSTETRICS & GYNECOLOGY

## 2017-10-11 PROCEDURE — 36415 COLL VENOUS BLD VENIPUNCTURE: CPT

## 2017-10-30 NOTE — PROGRESS NOTES
28w5d without major complaints.  1 hour and CBC drawn last visit, WNL.  Tdap and Rhogam given today.   RTC in 2 weeks for routine PNC.

## 2017-10-31 ENCOUNTER — CLINICAL SUPPORT (OUTPATIENT)
Dept: OBSTETRICS AND GYNECOLOGY | Facility: CLINIC | Age: 32
End: 2017-10-31
Payer: COMMERCIAL

## 2017-10-31 ENCOUNTER — ROUTINE PRENATAL (OUTPATIENT)
Dept: OBSTETRICS AND GYNECOLOGY | Facility: CLINIC | Age: 32
End: 2017-10-31
Payer: COMMERCIAL

## 2017-10-31 ENCOUNTER — LAB VISIT (OUTPATIENT)
Dept: LAB | Facility: OTHER | Age: 32
End: 2017-10-31
Attending: OBSTETRICS & GYNECOLOGY
Payer: COMMERCIAL

## 2017-10-31 VITALS — BODY MASS INDEX: 25.15 KG/M2 | SYSTOLIC BLOOD PRESSURE: 102 MMHG | DIASTOLIC BLOOD PRESSURE: 58 MMHG | WEIGHT: 146.5 LBS

## 2017-10-31 DIAGNOSIS — Z67.91 RH NEGATIVE STATE IN ANTEPARTUM PERIOD, SECOND TRIMESTER: ICD-10-CM

## 2017-10-31 DIAGNOSIS — O26.892 RH NEGATIVE STATE IN ANTEPARTUM PERIOD, SECOND TRIMESTER: Primary | ICD-10-CM

## 2017-10-31 DIAGNOSIS — O26.892 RH NEGATIVE STATE IN ANTEPARTUM PERIOD, SECOND TRIMESTER: ICD-10-CM

## 2017-10-31 DIAGNOSIS — Z23 NEED FOR TDAP VACCINATION: Primary | ICD-10-CM

## 2017-10-31 DIAGNOSIS — Z3A.28 28 WEEKS GESTATION OF PREGNANCY: ICD-10-CM

## 2017-10-31 DIAGNOSIS — O36.0990 RHESUS ISOIMMUNIZATION DURING PREGNANCY, ANTEPARTUM, SINGLE OR UNSPECIFIED FETUS: ICD-10-CM

## 2017-10-31 DIAGNOSIS — Z67.91 RH NEGATIVE STATE IN ANTEPARTUM PERIOD, SECOND TRIMESTER: Primary | ICD-10-CM

## 2017-10-31 LAB
ABO + RH BLD: NORMAL
BLD GP AB SCN CELLS X3 SERPL QL: NORMAL

## 2017-10-31 PROCEDURE — 36415 COLL VENOUS BLD VENIPUNCTURE: CPT

## 2017-10-31 PROCEDURE — 90715 TDAP VACCINE 7 YRS/> IM: CPT | Mod: S$GLB,,, | Performed by: OBSTETRICS & GYNECOLOGY

## 2017-10-31 PROCEDURE — 96372 THER/PROPH/DIAG INJ SC/IM: CPT | Mod: 59,S$GLB,, | Performed by: OBSTETRICS & GYNECOLOGY

## 2017-10-31 PROCEDURE — 0502F SUBSEQUENT PRENATAL CARE: CPT | Mod: S$GLB,,, | Performed by: OBSTETRICS & GYNECOLOGY

## 2017-10-31 PROCEDURE — 86850 RBC ANTIBODY SCREEN: CPT

## 2017-10-31 PROCEDURE — 99999 PR PBB SHADOW E&M-EST. PATIENT-LVL II: CPT | Mod: PBBFAC,,, | Performed by: OBSTETRICS & GYNECOLOGY

## 2017-10-31 PROCEDURE — 90471 IMMUNIZATION ADMIN: CPT | Mod: S$GLB,,, | Performed by: OBSTETRICS & GYNECOLOGY

## 2017-10-31 PROCEDURE — 86900 BLOOD TYPING SEROLOGIC ABO: CPT

## 2017-10-31 NOTE — PROGRESS NOTES
Ordering Physician: Dr. Martinez    Order Type: Verbal     During visit today patient received injection of Tdap to left deltoid and Rhogam to left glut. Patient tolerated well, no allergic reaction noted. Requested patient to remain 10 minutes after injection.     Pre-Pain Scale:None     Post Pain Scale:None

## 2017-11-09 ENCOUNTER — PATIENT MESSAGE (OUTPATIENT)
Dept: OBSTETRICS AND GYNECOLOGY | Facility: CLINIC | Age: 32
End: 2017-11-09

## 2017-11-10 ENCOUNTER — PATIENT MESSAGE (OUTPATIENT)
Dept: ADMINISTRATIVE | Facility: OTHER | Age: 32
End: 2017-11-10

## 2017-11-14 ENCOUNTER — PATIENT MESSAGE (OUTPATIENT)
Dept: OBSTETRICS AND GYNECOLOGY | Facility: CLINIC | Age: 32
End: 2017-11-14

## 2017-11-27 NOTE — PROGRESS NOTES
32w5d without major complaints.  Rhogam and Tdap given at last vsiit.   RTC in 2 weeks for routine PNC.

## 2017-11-28 ENCOUNTER — ROUTINE PRENATAL (OUTPATIENT)
Dept: OBSTETRICS AND GYNECOLOGY | Facility: CLINIC | Age: 32
End: 2017-11-28
Payer: COMMERCIAL

## 2017-11-28 VITALS
BODY MASS INDEX: 25.73 KG/M2 | DIASTOLIC BLOOD PRESSURE: 68 MMHG | SYSTOLIC BLOOD PRESSURE: 110 MMHG | WEIGHT: 149.94 LBS

## 2017-11-28 DIAGNOSIS — Z67.91 RH NEGATIVE STATE IN ANTEPARTUM PERIOD, SECOND TRIMESTER: Primary | ICD-10-CM

## 2017-11-28 DIAGNOSIS — O26.892 RH NEGATIVE STATE IN ANTEPARTUM PERIOD, SECOND TRIMESTER: Primary | ICD-10-CM

## 2017-11-28 DIAGNOSIS — Z3A.32 32 WEEKS GESTATION OF PREGNANCY: ICD-10-CM

## 2017-11-28 PROCEDURE — 0502F SUBSEQUENT PRENATAL CARE: CPT | Mod: S$GLB,,, | Performed by: OBSTETRICS & GYNECOLOGY

## 2017-11-28 PROCEDURE — 99999 PR PBB SHADOW E&M-EST. PATIENT-LVL III: CPT | Mod: PBBFAC,,, | Performed by: OBSTETRICS & GYNECOLOGY

## 2017-12-11 NOTE — PROGRESS NOTES
34w5d without major complaints.    GBS next visit. HIV, RPR next visit.   RTC in 1 week for routine PNC.

## 2017-12-12 ENCOUNTER — ROUTINE PRENATAL (OUTPATIENT)
Dept: OBSTETRICS AND GYNECOLOGY | Facility: CLINIC | Age: 32
End: 2017-12-12
Payer: COMMERCIAL

## 2017-12-12 VITALS
WEIGHT: 154.75 LBS | BODY MASS INDEX: 26.57 KG/M2 | SYSTOLIC BLOOD PRESSURE: 112 MMHG | DIASTOLIC BLOOD PRESSURE: 64 MMHG

## 2017-12-12 DIAGNOSIS — Z67.91 RH NEGATIVE STATE IN ANTEPARTUM PERIOD, THIRD TRIMESTER: Primary | ICD-10-CM

## 2017-12-12 DIAGNOSIS — O26.893 RH NEGATIVE STATE IN ANTEPARTUM PERIOD, THIRD TRIMESTER: Primary | ICD-10-CM

## 2017-12-12 DIAGNOSIS — Z3A.34 34 WEEKS GESTATION OF PREGNANCY: ICD-10-CM

## 2017-12-12 PROCEDURE — 0502F SUBSEQUENT PRENATAL CARE: CPT | Mod: S$GLB,,, | Performed by: OBSTETRICS & GYNECOLOGY

## 2017-12-12 PROCEDURE — 99999 PR PBB SHADOW E&M-EST. PATIENT-LVL II: CPT | Mod: PBBFAC,,, | Performed by: OBSTETRICS & GYNECOLOGY

## 2017-12-18 NOTE — PROGRESS NOTES
35w6d without major complaints.   GBS collected, HIV and RPR drawn.  Labor precautions reviewed.   RTC in 1 week for routine PNC.

## 2017-12-20 ENCOUNTER — ROUTINE PRENATAL (OUTPATIENT)
Dept: OBSTETRICS AND GYNECOLOGY | Facility: CLINIC | Age: 32
End: 2017-12-20
Payer: COMMERCIAL

## 2017-12-20 ENCOUNTER — LAB VISIT (OUTPATIENT)
Dept: LAB | Facility: HOSPITAL | Age: 32
End: 2017-12-20
Attending: OBSTETRICS & GYNECOLOGY
Payer: COMMERCIAL

## 2017-12-20 VITALS
BODY MASS INDEX: 26.17 KG/M2 | SYSTOLIC BLOOD PRESSURE: 112 MMHG | DIASTOLIC BLOOD PRESSURE: 66 MMHG | WEIGHT: 152.44 LBS

## 2017-12-20 DIAGNOSIS — O26.893 RH NEGATIVE STATE IN ANTEPARTUM PERIOD, THIRD TRIMESTER: Primary | ICD-10-CM

## 2017-12-20 DIAGNOSIS — Z67.91 RH NEGATIVE STATE IN ANTEPARTUM PERIOD, THIRD TRIMESTER: Primary | ICD-10-CM

## 2017-12-20 DIAGNOSIS — Z3A.35 35 WEEKS GESTATION OF PREGNANCY: ICD-10-CM

## 2017-12-20 PROCEDURE — 86592 SYPHILIS TEST NON-TREP QUAL: CPT

## 2017-12-20 PROCEDURE — 0502F SUBSEQUENT PRENATAL CARE: CPT | Mod: S$GLB,,, | Performed by: OBSTETRICS & GYNECOLOGY

## 2017-12-20 PROCEDURE — 87081 CULTURE SCREEN ONLY: CPT

## 2017-12-20 PROCEDURE — 99999 PR PBB SHADOW E&M-EST. PATIENT-LVL II: CPT | Mod: PBBFAC,,, | Performed by: OBSTETRICS & GYNECOLOGY

## 2017-12-20 PROCEDURE — 86703 HIV-1/HIV-2 1 RESULT ANTBDY: CPT

## 2017-12-20 NOTE — PROGRESS NOTES
37w1d without major complaints.   GBS negative.   Labor precautions reviewed. Consents signed in clinic today.  RTC in 1 week for routine PNC.

## 2017-12-21 LAB
HIV 1+2 AB+HIV1 P24 AG SERPL QL IA: NEGATIVE
RPR SER QL: NORMAL

## 2017-12-22 LAB — BACTERIA SPEC AEROBE CULT: NORMAL

## 2017-12-23 ENCOUNTER — PATIENT MESSAGE (OUTPATIENT)
Dept: OBSTETRICS AND GYNECOLOGY | Facility: CLINIC | Age: 32
End: 2017-12-23

## 2017-12-29 ENCOUNTER — ROUTINE PRENATAL (OUTPATIENT)
Dept: OBSTETRICS AND GYNECOLOGY | Facility: CLINIC | Age: 32
End: 2017-12-29
Payer: COMMERCIAL

## 2017-12-29 VITALS
WEIGHT: 156.75 LBS | DIASTOLIC BLOOD PRESSURE: 72 MMHG | SYSTOLIC BLOOD PRESSURE: 116 MMHG | BODY MASS INDEX: 26.91 KG/M2

## 2017-12-29 DIAGNOSIS — Z67.91 RH NEGATIVE STATE IN ANTEPARTUM PERIOD, THIRD TRIMESTER: ICD-10-CM

## 2017-12-29 DIAGNOSIS — R82.90 ABNORMAL URINALYSIS: ICD-10-CM

## 2017-12-29 DIAGNOSIS — O26.893 RH NEGATIVE STATE IN ANTEPARTUM PERIOD, THIRD TRIMESTER: ICD-10-CM

## 2017-12-29 DIAGNOSIS — Z3A.37 37 WEEKS GESTATION OF PREGNANCY: Primary | ICD-10-CM

## 2017-12-29 PROCEDURE — 87086 URINE CULTURE/COLONY COUNT: CPT

## 2017-12-29 PROCEDURE — 87077 CULTURE AEROBIC IDENTIFY: CPT

## 2017-12-29 PROCEDURE — 0502F SUBSEQUENT PRENATAL CARE: CPT | Mod: S$GLB,,, | Performed by: OBSTETRICS & GYNECOLOGY

## 2017-12-29 PROCEDURE — 99999 PR PBB SHADOW E&M-EST. PATIENT-LVL III: CPT | Mod: PBBFAC,,, | Performed by: OBSTETRICS & GYNECOLOGY

## 2017-12-29 PROCEDURE — 87088 URINE BACTERIA CULTURE: CPT

## 2017-12-29 PROCEDURE — 87186 SC STD MICRODIL/AGAR DIL: CPT

## 2018-01-04 ENCOUNTER — PATIENT MESSAGE (OUTPATIENT)
Dept: OBSTETRICS AND GYNECOLOGY | Facility: CLINIC | Age: 33
End: 2018-01-04

## 2018-01-04 LAB — BACTERIA UR CULT: NORMAL

## 2018-01-04 RX ORDER — NITROFURANTOIN 25; 75 MG/1; MG/1
100 CAPSULE ORAL 2 TIMES DAILY
Qty: 14 CAPSULE | Refills: 0 | Status: SHIPPED | OUTPATIENT
Start: 2018-01-04 | End: 2018-01-12

## 2018-01-05 ENCOUNTER — ROUTINE PRENATAL (OUTPATIENT)
Dept: OBSTETRICS AND GYNECOLOGY | Facility: CLINIC | Age: 33
End: 2018-01-05
Payer: COMMERCIAL

## 2018-01-05 VITALS
DIASTOLIC BLOOD PRESSURE: 72 MMHG | WEIGHT: 158.75 LBS | SYSTOLIC BLOOD PRESSURE: 114 MMHG | BODY MASS INDEX: 27.25 KG/M2

## 2018-01-05 DIAGNOSIS — O26.893 RH NEGATIVE STATE IN ANTEPARTUM PERIOD, THIRD TRIMESTER: Primary | ICD-10-CM

## 2018-01-05 DIAGNOSIS — Z3A.38 38 WEEKS GESTATION OF PREGNANCY: ICD-10-CM

## 2018-01-05 DIAGNOSIS — Z67.91 RH NEGATIVE STATE IN ANTEPARTUM PERIOD, THIRD TRIMESTER: Primary | ICD-10-CM

## 2018-01-05 PROCEDURE — 99999 PR PBB SHADOW E&M-EST. PATIENT-LVL II: CPT | Mod: PBBFAC,,, | Performed by: OBSTETRICS & GYNECOLOGY

## 2018-01-05 PROCEDURE — 0502F SUBSEQUENT PRENATAL CARE: CPT | Mod: S$GLB,,, | Performed by: OBSTETRICS & GYNECOLOGY

## 2018-01-08 NOTE — PROGRESS NOTES
39w1d without major complaints.   Labor precautions reviewed.   RTC in 1 week for routine PNC.   Plan for IOL at 41 wga but patient may decide to move date to 42 wga.

## 2018-01-12 ENCOUNTER — ROUTINE PRENATAL (OUTPATIENT)
Dept: OBSTETRICS AND GYNECOLOGY | Facility: CLINIC | Age: 33
End: 2018-01-12
Payer: COMMERCIAL

## 2018-01-12 VITALS
BODY MASS INDEX: 26.91 KG/M2 | DIASTOLIC BLOOD PRESSURE: 72 MMHG | WEIGHT: 156.75 LBS | SYSTOLIC BLOOD PRESSURE: 108 MMHG

## 2018-01-12 DIAGNOSIS — Z3A.39 39 WEEKS GESTATION OF PREGNANCY: ICD-10-CM

## 2018-01-12 DIAGNOSIS — O26.893 RH NEGATIVE STATE IN ANTEPARTUM PERIOD, THIRD TRIMESTER: Primary | ICD-10-CM

## 2018-01-12 DIAGNOSIS — Z67.91 RH NEGATIVE STATE IN ANTEPARTUM PERIOD, THIRD TRIMESTER: Primary | ICD-10-CM

## 2018-01-12 DIAGNOSIS — R31.9 HEMATURIA, UNSPECIFIED TYPE: ICD-10-CM

## 2018-01-12 PROCEDURE — 0502F SUBSEQUENT PRENATAL CARE: CPT | Mod: S$GLB,,, | Performed by: OBSTETRICS & GYNECOLOGY

## 2018-01-12 PROCEDURE — 99999 PR PBB SHADOW E&M-EST. PATIENT-LVL III: CPT | Mod: PBBFAC,,, | Performed by: OBSTETRICS & GYNECOLOGY

## 2018-01-19 ENCOUNTER — ROUTINE PRENATAL (OUTPATIENT)
Dept: OBSTETRICS AND GYNECOLOGY | Facility: CLINIC | Age: 33
End: 2018-01-19
Payer: COMMERCIAL

## 2018-01-19 VITALS
BODY MASS INDEX: 26.91 KG/M2 | SYSTOLIC BLOOD PRESSURE: 106 MMHG | DIASTOLIC BLOOD PRESSURE: 72 MMHG | WEIGHT: 156.75 LBS

## 2018-01-19 DIAGNOSIS — Z34.93 NORMAL PREGNANCY, THIRD TRIMESTER: ICD-10-CM

## 2018-01-19 DIAGNOSIS — R31.9 HEMATURIA, UNSPECIFIED TYPE: Primary | ICD-10-CM

## 2018-01-19 DIAGNOSIS — Z3A.40 40 WEEKS GESTATION OF PREGNANCY: ICD-10-CM

## 2018-01-19 PROCEDURE — 0502F SUBSEQUENT PRENATAL CARE: CPT | Mod: S$GLB,,, | Performed by: OBSTETRICS & GYNECOLOGY

## 2018-01-19 PROCEDURE — 99999 PR PBB SHADOW E&M-EST. PATIENT-LVL II: CPT | Mod: PBBFAC,,, | Performed by: OBSTETRICS & GYNECOLOGY

## 2018-01-19 NOTE — PROGRESS NOTES
40w1d without major complaints.   Discussed IOL and patient still on the fence. If she opts to cancel induction then we will get an NST next Friday at 41 1/7 and again on Tuesday at 41 4/7 and plan for IOL at 42 0/7.   Labor precautions reviewed.   RTC in 1 week for routine PNC.

## 2018-01-23 ENCOUNTER — TELEPHONE (OUTPATIENT)
Dept: OBSTETRICS AND GYNECOLOGY | Facility: CLINIC | Age: 33
End: 2018-01-23

## 2018-01-23 DIAGNOSIS — O48.0 POST-TERM PREGNANCY, 40-42 WEEKS OF GESTATION: Primary | ICD-10-CM

## 2018-01-23 NOTE — TELEPHONE ENCOUNTER
Juan pt calling, ob 40wks wants to know if she can push back her induction it's Thursday 1-25-18 pt wants to wait and r/s later date. Please call pt # [phone]

## 2018-01-24 NOTE — PROGRESS NOTES
41w1d without major complaints.   NST performed today for post-dates pregnancy.   IOL scheduled for 1/25.   Labor precautions reviewed.

## 2018-01-26 ENCOUNTER — HOSPITAL ENCOUNTER (OUTPATIENT)
Dept: PERINATAL CARE | Facility: OTHER | Age: 33
Discharge: HOME | End: 2018-01-26
Attending: OBSTETRICS & GYNECOLOGY
Payer: COMMERCIAL

## 2018-01-26 ENCOUNTER — ROUTINE PRENATAL (OUTPATIENT)
Dept: OBSTETRICS AND GYNECOLOGY | Facility: CLINIC | Age: 33
End: 2018-01-26
Payer: COMMERCIAL

## 2018-01-26 VITALS
BODY MASS INDEX: 26.92 KG/M2 | WEIGHT: 156.88 LBS | DIASTOLIC BLOOD PRESSURE: 68 MMHG | SYSTOLIC BLOOD PRESSURE: 110 MMHG

## 2018-01-26 DIAGNOSIS — Z67.91 RH NEGATIVE STATE IN ANTEPARTUM PERIOD, THIRD TRIMESTER: Primary | ICD-10-CM

## 2018-01-26 DIAGNOSIS — Z3A.41 41 WEEKS GESTATION OF PREGNANCY: ICD-10-CM

## 2018-01-26 DIAGNOSIS — O48.0 41 WEEKS GESTATION OF PREGNANCY: ICD-10-CM

## 2018-01-26 DIAGNOSIS — O48.0 POST-TERM PREGNANCY, 40-42 WEEKS OF GESTATION: ICD-10-CM

## 2018-01-26 DIAGNOSIS — O26.893 RH NEGATIVE STATE IN ANTEPARTUM PERIOD, THIRD TRIMESTER: Primary | ICD-10-CM

## 2018-01-26 PROCEDURE — 76815 OB US LIMITED FETUS(S): CPT | Mod: 26,,, | Performed by: OBSTETRICS & GYNECOLOGY

## 2018-01-26 PROCEDURE — 99999 PR PBB SHADOW E&M-EST. PATIENT-LVL II: CPT | Mod: PBBFAC,,, | Performed by: OBSTETRICS & GYNECOLOGY

## 2018-01-26 PROCEDURE — 0502F SUBSEQUENT PRENATAL CARE: CPT | Mod: S$GLB,,, | Performed by: OBSTETRICS & GYNECOLOGY

## 2018-01-26 PROCEDURE — 59025 FETAL NON-STRESS TEST: CPT

## 2018-01-26 PROCEDURE — 59025 FETAL NON-STRESS TEST: CPT | Mod: 26,,, | Performed by: OBSTETRICS & GYNECOLOGY

## 2018-01-26 PROCEDURE — 76815 OB US LIMITED FETUS(S): CPT

## 2018-01-29 ENCOUNTER — HOSPITAL ENCOUNTER (INPATIENT)
Facility: OTHER | Age: 33
LOS: 3 days | Discharge: HOME | End: 2018-02-01
Attending: OBSTETRICS & GYNECOLOGY | Admitting: OBSTETRICS & GYNECOLOGY
Payer: COMMERCIAL

## 2018-01-29 ENCOUNTER — ANESTHESIA EVENT (OUTPATIENT)
Dept: OBSTETRICS AND GYNECOLOGY | Facility: OTHER | Age: 33
End: 2018-01-29
Payer: COMMERCIAL

## 2018-01-29 ENCOUNTER — ANESTHESIA (OUTPATIENT)
Dept: OBSTETRICS AND GYNECOLOGY | Facility: OTHER | Age: 33
End: 2018-01-29
Payer: COMMERCIAL

## 2018-01-29 DIAGNOSIS — O48.0 POST-DATES PREGNANCY: ICD-10-CM

## 2018-01-29 DIAGNOSIS — O48.0 POST-TERM PREGNANCY, 40-42 WEEKS OF GESTATION: Primary | ICD-10-CM

## 2018-01-29 PROBLEM — O40.3XX0 POLYHYDRAMNIOS AFFECTING PREGNANCY IN THIRD TRIMESTER: Status: ACTIVE | Noted: 2018-01-29

## 2018-01-29 LAB
ABO + RH BLD: NORMAL
BASOPHILS # BLD AUTO: 0.02 K/UL
BASOPHILS NFR BLD: 0.2 %
BLD GP AB SCN CELLS X3 SERPL QL: NORMAL
DIFFERENTIAL METHOD: ABNORMAL
EOSINOPHIL # BLD AUTO: 0.1 K/UL
EOSINOPHIL NFR BLD: 0.9 %
ERYTHROCYTE [DISTWIDTH] IN BLOOD BY AUTOMATED COUNT: 13.6 %
HCT VFR BLD AUTO: 35.5 %
HGB BLD-MCNC: 12 G/DL
LYMPHOCYTES # BLD AUTO: 1.5 K/UL
LYMPHOCYTES NFR BLD: 16.2 %
MCH RBC QN AUTO: 28.4 PG
MCHC RBC AUTO-ENTMCNC: 33.8 G/DL
MCV RBC AUTO: 84 FL
MONOCYTES # BLD AUTO: 0.5 K/UL
MONOCYTES NFR BLD: 5.8 %
NEUTROPHILS # BLD AUTO: 7.1 K/UL
NEUTROPHILS NFR BLD: 76.7 %
PLATELET # BLD AUTO: 220 K/UL
PMV BLD AUTO: 12.6 FL
RBC # BLD AUTO: 4.22 M/UL
WBC # BLD AUTO: 9.27 K/UL

## 2018-01-29 PROCEDURE — 85025 COMPLETE CBC W/AUTO DIFF WBC: CPT

## 2018-01-29 PROCEDURE — 25000003 PHARM REV CODE 250: Performed by: OBSTETRICS & GYNECOLOGY

## 2018-01-29 PROCEDURE — 86870 RBC ANTIBODY IDENTIFICATION: CPT

## 2018-01-29 PROCEDURE — 11000001 HC ACUTE MED/SURG PRIVATE ROOM

## 2018-01-29 PROCEDURE — 86901 BLOOD TYPING SEROLOGIC RH(D): CPT

## 2018-01-29 RX ORDER — SODIUM CHLORIDE 9 MG/ML
INJECTION, SOLUTION INTRAVENOUS
Status: DISCONTINUED | OUTPATIENT
Start: 2018-01-29 | End: 2018-01-30

## 2018-01-29 RX ORDER — SODIUM CHLORIDE, SODIUM LACTATE, POTASSIUM CHLORIDE, CALCIUM CHLORIDE 600; 310; 30; 20 MG/100ML; MG/100ML; MG/100ML; MG/100ML
INJECTION, SOLUTION INTRAVENOUS CONTINUOUS
Status: DISCONTINUED | OUTPATIENT
Start: 2018-01-29 | End: 2018-01-29

## 2018-01-29 RX ORDER — TERBUTALINE SULFATE 1 MG/ML
0.25 INJECTION SUBCUTANEOUS
Status: DISCONTINUED | OUTPATIENT
Start: 2018-01-29 | End: 2018-01-30

## 2018-01-29 RX ORDER — MISOPROSTOL 100 MCG
25 TABLET ORAL EVERY 4 HOURS
Status: DISCONTINUED | OUTPATIENT
Start: 2018-01-29 | End: 2018-01-29

## 2018-01-29 RX ORDER — ONDANSETRON 8 MG/1
8 TABLET, ORALLY DISINTEGRATING ORAL EVERY 8 HOURS PRN
Status: DISCONTINUED | OUTPATIENT
Start: 2018-01-29 | End: 2018-01-30 | Stop reason: SDUPTHER

## 2018-01-29 RX ADMIN — DINOPROSTONE 10 MG: 10 INSERT VAGINAL at 09:01

## 2018-01-29 NOTE — H&P
HISTORY AND PHYSICAL                                                OBSTETRICS          Subjective:      Nallely Liang is a 32 y.o.  female with IUP at 41w4d weeks gestation who presents to L&D for induction of labor. Pertinent medical history for this pregnancy includes post-dates pregnancy and Rh negative status, patient has received Rhogam at 28 wga.  Patient reports normal fetal movement.  She denies contractions, vaginal bleeding and leakage of fluid.  Care this pregnancy has been with Dr. Martinez    PMHx: No past medical history on file.    PSHx:   Past Surgical History:   Procedure Laterality Date    ANTERIOR CRUCIATE LIGAMENT REPAIR Left        All: Review of patient's allergies indicates:  No Known Allergies    Meds:   No prescriptions prior to admission.       SH:   Social History     Social History    Marital status:      Spouse name: N/A    Number of children: 0    Years of education: N/A     Occupational History    nutrition research      [employer]     Social History Main Topics    Smoking status: Never Smoker    Smokeless tobacco: Never Used    Alcohol use 2.4 oz/week     4 Standard drinks or equivalent per week    Drug use: No    Sexual activity: Yes     Partners: Male     Birth control/ protection: None     Other Topics Concern    Not on file     Social History Narrative    The patient does exercise regularly (BIW-TIW: dance classes, yoga).      She is satisfied with weight.    Rates diet as good.    She does not drink at least 1/2 gallon water daily.    She drinks 2 coffee/tea/caffeine-containing soft drinks daily.    Total sleep time at night is 7-9 hours.    She works 40-45 hours per week.    She does wear seat belts.    Hobbies include sialing.           FH:   Family History   Problem Relation Age of Onset    Miscarriages / Stillbirths Maternal Grandmother     Stroke Maternal Grandfather     Heart attack Maternal Grandfather      No Known Problems Father     No Known Problems Mother     No Known Problems Brother     No Known Problems Paternal Grandmother     Heart attack Paternal Grandfather     No Known Problems Brother     No Known Problems Brother        OBHx:   Obstetric History       T0      L0     SAB0   TAB0   Ectopic0   Multiple0   Live Births0       # Outcome Date GA Lbr Christophe/2nd Weight Sex Delivery Anes PTL Lv   1 Current               Obstetric Comments   Menarche age 14.    Menses normal and regular.   History of abnormal PAP smear: NO.   History of sexually transmitted disease:  NO       Objective:      LMP 2017  BP: 110/68  There is no height or weight on file to calculate BMI.    General:   alert and cooperative   HEENT:  normocephalic, atraumatic   Lungs:   normal work of breathing   Heart:   normal cap refill   Abdomen:  gravid, non-tender, appropriate FH   Extremities non-tender, no edema   Derm: no rashes or lesions   Psych: appropriate mood and affect   Pelvis:  adequate       Fetal Monitor: Reactive NST in  testing on 18       Cervix:     Dilation: FT    Effacement: 50%    Station:  -3    Consistency: firm    Position posterior       Lab Review  Blood Type O NEG  GBBS: negative   Rubella: immune    RPR:   RPR   Date Value Ref Range Status   2017 Non-reactive Non-reactive Final      HIV:   Lab Results   Component Value Date    [id number] Negative 2017     HepB:   Hepatitis B Surface Ag   Date Value Ref Range Status   2017 Negative  Final        Assessment:     32 y.o.  at 41w4d weeks gestation for elective induction of labor  2. Rh negative     Plan:     1. Risks, benefits, alternatives and possible complications have been discussed in detail with the patient. All questions have been answered, and Ms. Liang has voiced understanding and agrees to the treatment plan.  2. Consents signed and in chart on 18 (See Media)  3. Admit to Labor and Delivery  unit

## 2018-01-30 PROBLEM — O48.0 POST-DATES PREGNANCY: Status: ACTIVE | Noted: 2018-01-30

## 2018-01-30 PROBLEM — O48.0 POST-DATES PREGNANCY: Status: RESOLVED | Noted: 2018-01-29 | Resolved: 2018-01-30

## 2018-01-30 PROBLEM — Z34.90 INTRAUTERINE PREGNANCY: Status: RESOLVED | Noted: 2017-05-23 | Resolved: 2018-01-30

## 2018-01-30 PROBLEM — O40.3XX0 POLYHYDRAMNIOS AFFECTING PREGNANCY IN THIRD TRIMESTER: Status: RESOLVED | Noted: 2018-01-29 | Resolved: 2018-01-30

## 2018-01-30 LAB — BLOOD GROUP ANTIBODIES SERPL: NORMAL

## 2018-01-30 PROCEDURE — 27800517 HC TRAY,EPIDURAL-CONTINUOUS: Performed by: ANESTHESIOLOGY

## 2018-01-30 PROCEDURE — 62326 NJX INTERLAMINAR LMBR/SAC: CPT | Performed by: STUDENT IN AN ORGANIZED HEALTH CARE EDUCATION/TRAINING PROGRAM

## 2018-01-30 PROCEDURE — 11000001 HC ACUTE MED/SURG PRIVATE ROOM

## 2018-01-30 PROCEDURE — S0020 INJECTION, BUPIVICAINE HYDRO: HCPCS | Performed by: ANESTHESIOLOGY

## 2018-01-30 PROCEDURE — 59400 OBSTETRICAL CARE: CPT | Mod: QY,,, | Performed by: ANESTHESIOLOGY

## 2018-01-30 PROCEDURE — 51702 INSERT TEMP BLADDER CATH: CPT

## 2018-01-30 PROCEDURE — 27200710 HC EPIDURAL INFUSION PUMP SET: Performed by: ANESTHESIOLOGY

## 2018-01-30 PROCEDURE — 25000003 PHARM REV CODE 250: Performed by: OBSTETRICS & GYNECOLOGY

## 2018-01-30 PROCEDURE — 25000003 PHARM REV CODE 250: Performed by: STUDENT IN AN ORGANIZED HEALTH CARE EDUCATION/TRAINING PROGRAM

## 2018-01-30 PROCEDURE — 27000181 HC CABLE, IUPC

## 2018-01-30 PROCEDURE — 63600175 PHARM REV CODE 636 W HCPCS: Performed by: OBSTETRICS & GYNECOLOGY

## 2018-01-30 PROCEDURE — 25000003 PHARM REV CODE 250: Performed by: ANESTHESIOLOGY

## 2018-01-30 RX ORDER — MISOPROSTOL 200 UG/1
TABLET ORAL
Status: DISPENSED
Start: 2018-01-30 | End: 2018-01-31

## 2018-01-30 RX ORDER — LIDOCAINE HYDROCHLORIDE AND EPINEPHRINE 15; 5 MG/ML; UG/ML
INJECTION, SOLUTION EPIDURAL
Status: DISCONTINUED | OUTPATIENT
Start: 2018-01-30 | End: 2018-01-30

## 2018-01-30 RX ORDER — MISOPROSTOL 200 UG/1
600 TABLET ORAL
Status: DISCONTINUED | OUTPATIENT
Start: 2018-01-30 | End: 2018-02-01 | Stop reason: HOSPADM

## 2018-01-30 RX ORDER — IBUPROFEN 600 MG/1
600 TABLET ORAL EVERY 6 HOURS
Status: DISCONTINUED | OUTPATIENT
Start: 2018-01-31 | End: 2018-01-30

## 2018-01-30 RX ORDER — OXYCODONE AND ACETAMINOPHEN 5; 325 MG/1; MG/1
1 TABLET ORAL EVERY 4 HOURS PRN
Status: DISCONTINUED | OUTPATIENT
Start: 2018-01-31 | End: 2018-02-01 | Stop reason: HOSPADM

## 2018-01-30 RX ORDER — FENTANYL/BUPIVACAINE/NS/PF 2MCG/ML-.1
PLASTIC BAG, INJECTION (ML) INJECTION
Status: DISPENSED
Start: 2018-01-30 | End: 2018-01-31

## 2018-01-30 RX ORDER — OXYTOCIN 10 [USP'U]/ML
INJECTION, SOLUTION INTRAMUSCULAR; INTRAVENOUS
Status: DISCONTINUED
Start: 2018-01-30 | End: 2018-01-30 | Stop reason: WASHOUT

## 2018-01-30 RX ORDER — ONDANSETRON 8 MG/1
8 TABLET, ORALLY DISINTEGRATING ORAL EVERY 8 HOURS PRN
Status: DISCONTINUED | OUTPATIENT
Start: 2018-01-31 | End: 2018-02-01 | Stop reason: HOSPADM

## 2018-01-30 RX ORDER — FENTANYL/BUPIVACAINE/NS/PF 2MCG/ML-.1
PLASTIC BAG, INJECTION (ML) INJECTION
Status: DISPENSED
Start: 2018-01-30 | End: 2018-01-30

## 2018-01-30 RX ORDER — DOCUSATE SODIUM 100 MG/1
200 CAPSULE, LIQUID FILLED ORAL 2 TIMES DAILY PRN
Status: DISCONTINUED | OUTPATIENT
Start: 2018-01-31 | End: 2018-02-01 | Stop reason: HOSPADM

## 2018-01-30 RX ORDER — OXYTOCIN/RINGER'S LACTATE 20/1000 ML
41.65 PLASTIC BAG, INJECTION (ML) INTRAVENOUS CONTINUOUS
Status: ACTIVE | OUTPATIENT
Start: 2018-01-31 | End: 2018-01-31

## 2018-01-30 RX ORDER — OXYTOCIN/RINGER'S LACTATE 20/1000 ML
PLASTIC BAG, INJECTION (ML) INTRAVENOUS
Status: DISPENSED
Start: 2018-01-30 | End: 2018-01-31

## 2018-01-30 RX ORDER — PRENATAL WITH FERROUS FUM AND FOLIC ACID 3080; 920; 120; 400; 22; 1.84; 3; 20; 10; 1; 12; 200; 27; 25; 2 [IU]/1; [IU]/1; MG/1; [IU]/1; MG/1; MG/1; MG/1; MG/1; MG/1; MG/1; UG/1; MG/1; MG/1; MG/1; MG/1
1 TABLET ORAL DAILY
Status: DISCONTINUED | OUTPATIENT
Start: 2018-01-31 | End: 2018-02-01 | Stop reason: HOSPADM

## 2018-01-30 RX ORDER — FENTANYL/BUPIVACAINE/NS/PF 2MCG/ML-.1
PLASTIC BAG, INJECTION (ML) INJECTION CONTINUOUS PRN
Status: DISCONTINUED | OUTPATIENT
Start: 2018-01-30 | End: 2018-01-30

## 2018-01-30 RX ORDER — BUPIVACAINE HYDROCHLORIDE 2.5 MG/ML
INJECTION, SOLUTION EPIDURAL; INFILTRATION; INTRACAUDAL
Status: DISPENSED
Start: 2018-01-30 | End: 2018-01-30

## 2018-01-30 RX ORDER — HYDROCORTISONE 25 MG/G
CREAM TOPICAL 3 TIMES DAILY PRN
Status: DISCONTINUED | OUTPATIENT
Start: 2018-01-31 | End: 2018-02-01 | Stop reason: HOSPADM

## 2018-01-30 RX ORDER — ACETAMINOPHEN 325 MG/1
650 TABLET ORAL EVERY 6 HOURS PRN
Status: DISCONTINUED | OUTPATIENT
Start: 2018-01-31 | End: 2018-02-01 | Stop reason: HOSPADM

## 2018-01-30 RX ORDER — BUPIVACAINE HYDROCHLORIDE 2.5 MG/ML
INJECTION, SOLUTION INFILTRATION; PERINEURAL CONTINUOUS PRN
Status: DISCONTINUED | OUTPATIENT
Start: 2018-01-30 | End: 2018-01-30

## 2018-01-30 RX ORDER — CARBOPROST TROMETHAMINE 250 UG/ML
INJECTION, SOLUTION INTRAMUSCULAR
Status: DISCONTINUED
Start: 2018-01-30 | End: 2018-01-30 | Stop reason: WASHOUT

## 2018-01-30 RX ORDER — BUPIVACAINE HYDROCHLORIDE 2.5 MG/ML
INJECTION, SOLUTION EPIDURAL; INFILTRATION; INTRACAUDAL
Status: DISPENSED
Start: 2018-01-30 | End: 2018-01-31

## 2018-01-30 RX ORDER — DIPHENHYDRAMINE HCL 25 MG
25 CAPSULE ORAL EVERY 4 HOURS PRN
Status: DISCONTINUED | OUTPATIENT
Start: 2018-01-31 | End: 2018-02-01 | Stop reason: HOSPADM

## 2018-01-30 RX ORDER — METHYLERGONOVINE MALEATE 0.2 MG/ML
INJECTION INTRAVENOUS
Status: DISCONTINUED
Start: 2018-01-30 | End: 2018-01-30 | Stop reason: WASHOUT

## 2018-01-30 RX ORDER — OXYTOCIN/RINGER'S LACTATE 20/1000 ML
2 PLASTIC BAG, INJECTION (ML) INTRAVENOUS CONTINUOUS
Status: DISCONTINUED | OUTPATIENT
Start: 2018-01-30 | End: 2018-01-30

## 2018-01-30 RX ORDER — OXYCODONE AND ACETAMINOPHEN 10; 325 MG/1; MG/1
1 TABLET ORAL EVERY 4 HOURS PRN
Status: DISCONTINUED | OUTPATIENT
Start: 2018-01-31 | End: 2018-02-01 | Stop reason: HOSPADM

## 2018-01-30 RX ORDER — IBUPROFEN 600 MG/1
600 TABLET ORAL EVERY 6 HOURS
Status: DISCONTINUED | OUTPATIENT
Start: 2018-01-30 | End: 2018-02-01 | Stop reason: HOSPADM

## 2018-01-30 RX ORDER — MISOPROSTOL 200 UG/1
TABLET ORAL
Status: DISCONTINUED
Start: 2018-01-30 | End: 2018-01-30 | Stop reason: WASHOUT

## 2018-01-30 RX ORDER — FENTANYL/BUPIVACAINE/NS/PF 2MCG/ML-.1
PLASTIC BAG, INJECTION (ML) INJECTION CONTINUOUS
Status: DISCONTINUED | OUTPATIENT
Start: 2018-01-30 | End: 2018-01-30

## 2018-01-30 RX ORDER — DIPHENHYDRAMINE HYDROCHLORIDE 50 MG/ML
25 INJECTION INTRAMUSCULAR; INTRAVENOUS EVERY 4 HOURS PRN
Status: DISCONTINUED | OUTPATIENT
Start: 2018-01-31 | End: 2018-02-01 | Stop reason: HOSPADM

## 2018-01-30 RX ADMIN — Medication 8 ML: at 02:01

## 2018-01-30 RX ADMIN — Medication 333 MILLI-UNITS/MIN: at 10:01

## 2018-01-30 RX ADMIN — Medication 2 MILLI-UNITS/MIN: at 03:01

## 2018-01-30 RX ADMIN — Medication 10 ML/HR: at 03:01

## 2018-01-30 RX ADMIN — Medication 41.65 MILLI-UNITS/MIN: at 11:01

## 2018-01-30 RX ADMIN — IBUPROFEN 600 MG: 600 TABLET, FILM COATED ORAL at 11:01

## 2018-01-30 RX ADMIN — LIDOCAINE HYDROCHLORIDE,EPINEPHRINE BITARTRATE 3 ML: 15; .005 INJECTION, SOLUTION EPIDURAL; INFILTRATION; INTRACAUDAL; PERINEURAL at 03:01

## 2018-01-30 RX ADMIN — BUPIVACAINE HYDROCHLORIDE 6 ML/HR: 2.5 INJECTION, SOLUTION INFILTRATION; PERINEURAL at 06:01

## 2018-01-30 NOTE — PROGRESS NOTES
Labor Progress Note        Subjective:      Patient currently doing well without complaints.     Objective:      Temp:  [98 °F (36.7 °C)-99.5 °F (37.5 °C)] 99.5 °F (37.5 °C)  Pulse:  [63-95] 78  Resp:  [16-18] 18  SpO2:  [97 %-100 %] 98 %  BP: (104-125)/(69-81) 125/78  Body mass index is 26.61 kg/m².     General: no acute distress  Electronic Fetal Monitoring:  FHT: Category: 1                 TOCO: Contractions: IUPC placed     Assessment:     1. IUP at  here for induction of labor     Plan:     1. Continue active management of labor. Pitocin @ 4  2. Reassuring FHT  3. Epidural yes.   4. Membranes ruptured yes.   5. Cervix:8/100/0   6. Recheck in 2-4 hours or prn.  IUPC palced - Will increase pitocin to make contractions adequate

## 2018-01-30 NOTE — PROGRESS NOTES
LABOR PROGRESS NOTE    S:  MD to bedside for labor check. Complaints: No.  Comfortable with epidural    O: Temp:  [98.1 °F (36.7 °C)] 98.1 °F (36.7 °C)  Pulse:  [68-88] 68  Resp:  [16] 16  BP: (104-125)/(69-81) 106/71      FHT: 130 BPM/+moderate beat to beat variability/+ accels/no decels Cat 1 (reassuring)  CTX: q 2-3 minutes, s/p cervidil  SVE: 4.5/90/0, mid position, clear fluid leaking        ASSESSMENT:   32 y.o.  at 41w5d, in labor via induction for prolonged pregnancy    FHT reassuring    Active Hospital Problems    Diagnosis  POA    *Post-term pregnancy, 40-42 weeks of gestation [O48.0]  Yes    Post-dates pregnancy [O48.0]  Yes    Polyhydramnios affecting pregnancy in third trimester [O40.3XX0]  Yes     MVP 9 cm on 18        Resolved Hospital Problems    Diagnosis Date Resolved POA   No resolved problems to display.         PLAN:    Labor management  Continue Close Maternal/Fetal Monitoring.   Pitocin Augmentation per protocol if no change  Recheck 2 hours or PRN      Kiana Barajas MD

## 2018-01-30 NOTE — PROGRESS NOTES
Labor Progress Note        Subjective:      Patient currently doing well without complaints.     Objective:      Temp:  [98 °F (36.7 °C)-98.3 °F (36.8 °C)] 98.3 °F (36.8 °C)  Pulse:  [63-90] 77  Resp:  [16-18] 18  SpO2:  [97 %-100 %] 99 %  BP: (104-125)/(69-81) 117/76  Body mass index is 26.61 kg/m².     General: no acute distress  Electronic Fetal Monitoring:  FHT: Category: 1                 TOCO: Contractions: irregular, every 2-5 minutes     Assessment:     1. IUP at  here for induction of labor     Plan:     1. Start Pitocin.   2. Reassuring FHT  3. Epidural yes.   4. Membranes ruptured yes.   5. Cervix:8/100/0   6. Recheck in 2-4 hours or prn.

## 2018-01-30 NOTE — ANESTHESIA PROCEDURE NOTES
Epidural    Patient location during procedure: OB   Reason for block: primary anesthetic   Diagnosis: IUP   Start time: 1/30/2018 3:45 AM  Timeout: 1/30/2018 3:45 AM  End time: 1/30/2018 3:53 AM  Staffing  Anesthesiologist: CHENG URRUTIA  Resident/CRNA: CECILIA JACOBS  Performed: resident/CRNA   Preanesthetic Checklist  Completed: patient identified, site marked, surgical consent, pre-op evaluation, timeout performed, IV checked, risks and benefits discussed, monitors and equipment checked, anesthesia consent given, hand hygiene performed and patient being monitored  Preparation  Patient position: sitting  Prep: ChloraPrep  Patient monitoring: Pulse Ox and Blood Pressure  Epidural  Skin Anesthetic: lidocaine 1%  Skin Wheal: 3 mL  Administration type: continuous  Approach: midline  Interspace: L3-4  Injection technique: GIOVANNA saline  Needle and Epidural Catheter  Needle type: Tuohy   Needle gauge: 17  Needle length: 3.5 inches  Needle insertion depth: 4 cm  Catheter type: springwound and multi-orifice  Catheter size: 19 G  Catheter at skin depth: 8.5 cm  Test dose: 3 mL of lidocaine 1.5% with Epi 1-to-200,000  Additional Documentation: incremental injection, negative aspiration for heme and CSF and no paresthesia on injection  Needle localization: anatomical landmarks  Assessment  Upper dermatomal levels - Left: T5  Right: T5   Dermatomal levels determined by alcohol wipe  Ease of block: easy  Patient's tolerance of the procedure: comfortable throughout block and no complaints  Post dural Puncture Headache?: No

## 2018-01-30 NOTE — ANESTHESIA PREPROCEDURE EVALUATION
2018  Nallely Liang is a 32 y.o., female.    Nallely Liang is a 32 y.o. female  female with IUP at 41w4d weeks gestation who presents to L&D for induction of labor. Pertinent medical history for this pregnancy includes post-dates pregnancy and Rh negative status, patient has received Rhogam at 28 wga.    OB History    Para Term  AB Living   1 0 0 0 0 0   SAB TAB Ectopic Multiple Live Births   0 0 0 0        # Outcome Date GA Lbr Christophe/2nd Weight Sex Delivery Anes PTL Lv   1 Current               Obstetric Comments   Menarche age 14.    Menses normal and regular.   History of abnormal PAP smear: NO.   History of sexually transmitted disease:  NO       Wt Readings from Last 1 Encounters:   18 70.3 kg (155 lb)       BP Readings from Last 3 Encounters:   18 110/68   18 106/72   18 108/72       Patient Active Problem List   Diagnosis    Intrauterine pregnancy    Post-term pregnancy, 40-42 weeks of gestation    Post-dates pregnancy    Polyhydramnios affecting pregnancy in third trimester       Past Surgical History:   Procedure Laterality Date    ANTERIOR CRUCIATE LIGAMENT REPAIR Left        Social History     Social History    Marital status:      Spouse name: N/A    Number of children: 0    Years of education: N/A     Occupational History    nutrition research      [employer]     Social History Main Topics    Smoking status: Never Smoker    Smokeless tobacco: Never Used    Alcohol use 2.4 oz/week     4 Standard drinks or equivalent per week    Drug use: No    Sexual activity: Yes     Partners: Male     Birth control/ protection: None     Other Topics Concern    Not on file     Social History Narrative    The patient does exercise regularly (BIW-TIW: dance classes, yoga).      She is satisfied with  weight.    Rates diet as good.    She does not drink at least 1/2 gallon water daily.    She drinks 2 coffee/tea/caffeine-containing soft drinks daily.    Total sleep time at night is 7-9 hours.    She works 40-45 hours per week.    She does wear seat belts.    Hobbies include sialing.             Chemistry        Component Value Date/Time     05/31/2017 0950    K 3.9 05/31/2017 0950     05/31/2017 0950    CO2 23 05/31/2017 0950    BUN 8 05/31/2017 0950    CREATININE 0.8 05/31/2017 0950    GLU 77 05/31/2017 0950        Component Value Date/Time    CALCIUM 9.5 05/31/2017 0950    ALKPHOS 37 (L) 05/31/2017 0950    AST 17 05/31/2017 0950    ALT 12 05/31/2017 0950    BILITOT 0.3 05/31/2017 0950    ESTGFRAFRICA >60.0 05/31/2017 0950    EGFRNONAA >60.0 05/31/2017 0950            Lab Results   Component Value Date    WBC 10.64 10/11/2017    HGB 11.4 (L) 10/11/2017    HCT 34.6 (L) 10/11/2017    MCV 87 10/11/2017     10/11/2017       No results for input(s): PT, INR, PROTIME, APTT in the last 72 hours.      Anesthesia Evaluation    I have reviewed the Patient Summary Reports.    I have reviewed the Nursing Notes.   I have reviewed the Medications.     Review of Systems  Anesthesia Hx:  No problems with previous Anesthesia  Neg history of prior surgery.  Denies Personal Hx of Anesthesia complications.   Social:  Non-Smoker, No Alcohol Use    Hematology/Oncology:  Hematology Normal   Oncology Normal     EENT/Dental:EENT/Dental Normal   Cardiovascular:  Cardiovascular Normal     Pulmonary:  Pulmonary Normal    Renal/:  Renal/ Normal     Hepatic/GI:  Hepatic/GI Normal    Musculoskeletal:  Musculoskeletal Normal    Neurological:  Neurology Normal    Endocrine:  Endocrine Normal    Dermatological:  Skin Normal    Psych:  Psychiatric Normal           Physical Exam  General:  Well nourished    Airway/Jaw/Neck:  Airway Findings: Mouth Opening: Normal Tongue: Normal  General Airway Assessment: Adult  Mallampati:  II  Improves to II with phonation.  TM Distance: Normal, at least 6 cm  Jaw/Neck Findings:  Neck ROM: Normal ROM      Dental:  Dental Findings: In tact   Chest/Lungs:  Chest/Lungs Findings: Clear to auscultation, Normal Respiratory Rate     Heart/Vascular:  Heart Findings: Rate: Normal  Rhythm: Regular Rhythm  Sounds: Normal  Heart murmur: negative Vascular Findings: Normal    Abdomen:  Abdomen Findings:     Musculoskeletal:  Musculoskeletal Findings: Normal   Skin:  Skin Findings: Normal    Mental Status:  Mental Status Findings: Normal        Anesthesia Plan  Type of Anesthesia, risks & benefits discussed:  Anesthesia Type:  epidural, CSE, general, spinal  Patient's Preference:   Intra-op Monitoring Plan: standard ASA monitors  Intra-op Monitoring Plan Comments:   Post Op Pain Control Plan: multimodal analgesia  Post Op Pain Control Plan Comments:   Induction:   IV and rapid sequence  Beta Blocker:  Patient is not currently on a Beta-Blocker (No further documentation required).       Informed Consent: Patient understands risks and agrees with Anesthesia plan.  Questions answered. Anesthesia consent signed with patient.  ASA Score: 2     Day of Surgery Review of History & Physical:    H&P update referred to the surgeon.         Ready For Surgery From Anesthesia Perspective.

## 2018-01-30 NOTE — INTERVAL H&P NOTE
The patient has been examined and the H&P has been reviewed:    I concur with the findings and changes have been noted since the H&P was written: Cervical exam: 1.5 cm/50% effaced/posterior/-3, cephalic by sutures.    Patient requests cervidil insert instead of misoprostol.  This is ok. Also, patient requests saline lock for now and to be allowed to drink clear liquids.  This is also ok.    NST:   BPM, moderate variability; + accelerations no decelerations  TOCO: acontractile.    Consents for delivery & blood reviewed; signed previously.     Active Hospital Problems    Diagnosis  POA    *Post-term pregnancy, 40-42 weeks of gestation [O48.0]  Yes    Post-dates pregnancy [O48.0]  Yes      Resolved Hospital Problems    Diagnosis Date Resolved POA   No resolved problems to display.

## 2018-01-30 NOTE — PROGRESS NOTES
LABOR PROGRESS NOTE    S:  MD to bedside for labor check. Complaints: Yes - painful contractions. Patient had SROM ~ 01:15 am - clear & copious fluid.      O:        FHT: 125 BPM/+moderate beat to beat variability/+accels/no decels Cat 1 (reassuring)  CTX: q 1-2 minutes, Cervidil  SVE: 4/90/-2, soft, clear fluid, cervidil removed intact        ASSESSMENT:   32 y.o.  at 41w5d, in early labor with induction    FHT reassuring    Active Hospital Problems    Diagnosis  POA    *Post-term pregnancy, 40-42 weeks of gestation [O48.0]  Yes    Post-dates pregnancy [O48.0]  Yes    Polyhydramnios affecting pregnancy in third trimester [O40.3XX0]  Yes     MVP 9 cm on 18        Resolved Hospital Problems    Diagnosis Date Resolved POA   No resolved problems to display.         PLAN:    Labor management  Continue Close Maternal/Fetal Monitoring.   Recheck 2 hours or PRN  Will consider starting pitocin after next check if contractions decrease in frequency    Kiana Barajas MD

## 2018-01-30 NOTE — PROGRESS NOTES
Labor Progress Note        Subjective:      Patient currently doing well without complaints.     Objective:      Temp:  [98 °F (36.7 °C)-98.1 °F (36.7 °C)] 98 °F (36.7 °C)  Pulse:  [68-88] 84  Resp:  [16-18] 18  SpO2:  [99 %] 99 %  BP: (104-125)/(69-81) 114/76  Body mass index is 26.61 kg/m².     General: no acute distress  Electronic Fetal Monitoring:  FHT: Category: 1                 TOCO: Contractions: regular, every 3 minutes     Assessment:     1. IUP at  here for induction of labor     Plan:     1. Continue expectant management.   2. Reassuring FHT  3. Epidural yes.   4. Membranes ruptured yes.   5. Cervix:6/100/0   6. Recheck in 2-4 hours or prn.

## 2018-01-31 PROBLEM — O48.0 POST-DATES PREGNANCY: Status: RESOLVED | Noted: 2018-01-30 | Resolved: 2018-01-31

## 2018-01-31 LAB
ABO + RH BLD: NORMAL
BASOPHILS # BLD AUTO: 0.04 K/UL
BASOPHILS NFR BLD: 0.2 %
BLD GP AB SCN CELLS X3 SERPL QL: NORMAL
DIFFERENTIAL METHOD: ABNORMAL
EOSINOPHIL # BLD AUTO: 0.1 K/UL
EOSINOPHIL NFR BLD: 0.4 %
ERYTHROCYTE [DISTWIDTH] IN BLOOD BY AUTOMATED COUNT: 13.8 %
FETAL CELL SCN BLD QL ROSETTE: NORMAL
HCT VFR BLD AUTO: 29.9 %
HGB BLD-MCNC: 10.2 G/DL
LYMPHOCYTES # BLD AUTO: 1.7 K/UL
LYMPHOCYTES NFR BLD: 9.4 %
MCH RBC QN AUTO: 28.7 PG
MCHC RBC AUTO-ENTMCNC: 34.1 G/DL
MCV RBC AUTO: 84 FL
MONOCYTES # BLD AUTO: 0.7 K/UL
MONOCYTES NFR BLD: 4.1 %
NEUTROPHILS # BLD AUTO: 15.2 K/UL
NEUTROPHILS NFR BLD: 85.6 %
PLATELET # BLD AUTO: 182 K/UL
PMV BLD AUTO: 12.2 FL
RBC # BLD AUTO: 3.56 M/UL
WBC # BLD AUTO: 17.72 K/UL

## 2018-01-31 PROCEDURE — 85025 COMPLETE CBC W/AUTO DIFF WBC: CPT

## 2018-01-31 PROCEDURE — 99024 POSTOP FOLLOW-UP VISIT: CPT | Mod: ,,, | Performed by: OBSTETRICS & GYNECOLOGY

## 2018-01-31 PROCEDURE — 11000001 HC ACUTE MED/SURG PRIVATE ROOM

## 2018-01-31 PROCEDURE — 36415 COLL VENOUS BLD VENIPUNCTURE: CPT

## 2018-01-31 PROCEDURE — 72200006 HC VAGINAL DELIVERY LEVEL III

## 2018-01-31 PROCEDURE — 0UQGXZZ REPAIR VAGINA, EXTERNAL APPROACH: ICD-10-PCS | Performed by: OBSTETRICS & GYNECOLOGY

## 2018-01-31 PROCEDURE — 27201127 HC VACUUM EXTRACTOR

## 2018-01-31 PROCEDURE — 3E0P7VZ INTRODUCTION OF HORMONE INTO FEMALE REPRODUCTIVE, VIA NATURAL OR ARTIFICIAL OPENING: ICD-10-PCS | Performed by: OBSTETRICS & GYNECOLOGY

## 2018-01-31 PROCEDURE — 85461 HEMOGLOBIN FETAL: CPT

## 2018-01-31 PROCEDURE — 25000003 PHARM REV CODE 250: Performed by: OBSTETRICS & GYNECOLOGY

## 2018-01-31 PROCEDURE — 63600175 PHARM REV CODE 636 W HCPCS: Performed by: OBSTETRICS & GYNECOLOGY

## 2018-01-31 PROCEDURE — 59400 OBSTETRICAL CARE: CPT | Mod: GB,,, | Performed by: OBSTETRICS & GYNECOLOGY

## 2018-01-31 PROCEDURE — 51702 INSERT TEMP BLADDER CATH: CPT

## 2018-01-31 PROCEDURE — 86850 RBC ANTIBODY SCREEN: CPT

## 2018-01-31 PROCEDURE — 72100002 HC LABOR CARE, 1ST 8 HOURS

## 2018-01-31 RX ORDER — OXYCODONE AND ACETAMINOPHEN 5; 325 MG/1; MG/1
1 TABLET ORAL EVERY 4 HOURS PRN
Qty: 15 TABLET | Refills: 0 | Status: SHIPPED | OUTPATIENT
Start: 2018-01-31 | End: 2018-06-22

## 2018-01-31 RX ORDER — IBUPROFEN 600 MG/1
600 TABLET ORAL EVERY 6 HOURS
Qty: 45 TABLET | Refills: 0 | Status: SHIPPED | OUTPATIENT
Start: 2018-01-31 | End: 2018-06-22

## 2018-01-31 RX ORDER — DOCUSATE SODIUM 100 MG/1
200 CAPSULE, LIQUID FILLED ORAL 2 TIMES DAILY PRN
Refills: 0 | COMMUNITY
Start: 2018-01-31 | End: 2018-06-22

## 2018-01-31 RX ADMIN — ACETAMINOPHEN 650 MG: 325 TABLET ORAL at 09:01

## 2018-01-31 RX ADMIN — IBUPROFEN 600 MG: 600 TABLET, FILM COATED ORAL at 12:01

## 2018-01-31 RX ADMIN — IBUPROFEN 600 MG: 600 TABLET, FILM COATED ORAL at 07:01

## 2018-01-31 RX ADMIN — IBUPROFEN 600 MG: 600 TABLET, FILM COATED ORAL at 05:01

## 2018-01-31 NOTE — L&D DELIVERY NOTE
[facility]  Vaginal Delivery   Operative Note    SUMMARY     Vacuum assisted vaginal delivery of live infant, who was then assessed by the NICU team secondary to meconium. Vacuum was placed secondary to poor maternal effort after 2 hours of pushing. Vacuum was placed at the flexion point and used through 2 contractions. 6 total pulls were performed. Vacuum was kept within the factory recommended pressure range and the pressure was reduced entirely between contractions. Good descent was noted with each pull. Vacuum was used to guide the head from +2 to +3 station, and patient was then able to deliver the infant.  Infant delivered position FRANK over intact perineum.  Nuchal cord: No.    Spontaneous delivery of placenta and IV pitocin given noting uterine atony without bleeding.  stellate vaginal laceration present and repair performed with a combination of 2-0 vicryl and 2-0 chromic suture.  Patient tolerated delivery well. Sponge needle and lap counted correctly x2.    Indications: Vaginal delivery  Pregnancy complicated by:   Patient Active Problem List   Diagnosis    Vaginal delivery    Post-dates pregnancy     Admitting GA: 41w5d    Delivery Information for  Cameron Liang    Birth information:  YOB: 2017   Time of birth: 10:36 PM   Sex: male   Head Delivery Date/Time: 2018 10:35 PM   Delivery type: Vaginal, Vacuum (Extractor)   Gestational Age: 41w5d    Delivery Providers    Delivering clinician:  Lyssa Martinez MD   Other personnel:   Provider Role   Leticia Galvin, RN Delivery Nurse   Sonal Ramires RN Charge Nurse   Stefanie ZAPATA [facility]                Measurements    Weight:  3630 g            Assessment    Living status:  Living  Apgars:     1 Minute:   5 Minute:   10 Minute:   15 Minute:   20 Minute:     Skin Color:   1  1       Heart Rate:   2  2       Reflex Irritability:   2  2       Muscle Tone:   1  2        Respiratory Effort:   2  2       Total:   8  9               Apgars Assigned By:  NICU         Assisted Delivery Details:    Forceps attempted?:  No  Vacuum extractor attempted?:  Yes  Vacuum indications:  Maternal Fatigue, Prolonged Labor  Vacuum type:  Kiwi  Vacuum application location:  Low  First attempt time vacuum applied:  2018  First attempt time vacuum removed:  2018  Number of pop offs:  0  Number of pulls with vacuum:  6  Total vacuum application time:  4 minutes  Vacuum applied by:  CESAR HUFF MD  Failed vacuum delivery?:  No         Shoulder Dystocia    Shoulder dystocia present?:  No           Presentation and Position    Presentation:   Vertex   Position:                   Interventions/Resuscitation    Method:  NICU Attended       Cord    Vessels:  3 vessels  Complications:  None  Delayed Cord Clamping?:  No  Cord Clamped Date/Time:  2018 10:36 PM  Cord Blood Disposition:  Sent with Baby  Gases Sent?:  Yes  Stem Cell Collection (by MD):  No       Placenta    Date and time:  2018 10:39 PM  Removal:  Spontaneous  Appearance:  Intact  Placenta disposition:  discarded           Labor Events:       labor: No     Labor Onset Date/Time:         Dilation Complete Date/Time:         Start Pushing Date/Time: 2018 19:50     Rupture Date/Time:              Rupture type:           Fluid Amount:        Fluid Color:        Fluid Odor:        Membrane Status (PeriCalm): SRM (Spontaneous Rupture)      Rupture Date/Time (PeriCalm): 2018 00:57:00      Fluid Amount (PeriCalm): Moderate      Fluid Color (PeriCalm): Meconium Moderate       steroids: None     Antibiotics given for GBS: No     Induction: dinoprostone insert     Indications for induction:  Post-term Gestation     Augmentation: oxytocin     Indications for augmentation: Ineffective Contraction Pattern     Labor complications: None     Additional complications:          Cervical ripening:  2018 9:20 PM      Cervidil          Delivery:      Episiotomy: None     Indication for Episiotomy:       Perineal Lacerations: None Repaired:      Periurethral Laceration: none Repaired:     Labial Laceration: none Repaired:     Sulcus Laceration: none Repaired:     Vaginal Laceration: Yes Repaired: Yes   Cervical Laceration: No Repaired:     Repair suture:       Repair # of packets: 2     Vaginal delivery QBL (mL): 413      QBL (mL): 0     Combined Blood Loss (mL): 413     Vaginal Sweep Performed: Yes     Surgicount Correct:         Other providers:       Anesthesia    Method:  Epidural

## 2018-01-31 NOTE — PLAN OF CARE
Problem: Patient Care Overview  Goal: Plan of Care Review  Outcome: Ongoing (interventions implemented as appropriate)  To breastfeed baby 8x or more in 24 hours, feed on cue. To practice correct latch and positioning with breast compression during feeding and skin to skin during feeding.  To observe for signs of milk transfer during feeding.  To call for further breastfeeding assistance/ support if needed.  If unable to latch, to hand express and pump and give expressed milk to baby.

## 2018-01-31 NOTE — SUBJECTIVE & OBJECTIVE
Hospital course: Patient reached full dilatation and pushed for 2 hours, vacuum assisted delivery performed due to maternal exhaustion.  PPD #1: Patient c/o mild perineal soreness, minimal lochia, breastfeeding well. R/B/A of circumcision discussed with both parents, consent signed after questions answered.    Interval History:     She is doing well this morning. She is tolerating a regular diet without nausea or vomiting. She is voiding spontaneously. She is ambulating. She has passed flatus, and has not a BM. Vaginal bleeding is mild. She denies fever or chills. Abdominal pain is moderate and controlled with oral medications. She is breastfeeding. She desires circumcision for her male baby: yes.    Objective:     Vital Signs (Most Recent):  Temp: 98.6 °F (37 °C) (01/31/18 0800)  Pulse: 81 (01/31/18 0800)  Resp: 18 (01/31/18 0800)  BP: (!) 97/57 (01/31/18 0800)  SpO2: 98 % (01/31/18 0800) Vital Signs (24h Range):  Temp:  [98 °F (36.7 °C)-99.5 °F (37.5 °C)] 98.6 °F (37 °C)  Pulse:  [] 81  Resp:  [16-18] 18  SpO2:  [96 %-100 %] 98 %  BP: ()/() 97/57     Weight: 70.3 kg (155 lb)  Body mass index is 26.61 kg/m².      Intake/Output Summary (Last 24 hours) at 01/31/18 0923  Last data filed at 01/31/18 0100   Gross per 24 hour   Intake          1894.35 ml   Output             2313 ml   Net          -418.65 ml       Significant Labs:  Lab Results   Component Value Date    GROUPTRH O NEG 01/29/2018    HEPBSAG Negative 05/31/2017    STREPBCULT No Group B Streptococcus isolated 12/20/2017       Recent Labs  Lab 01/29/18  2100   HGB 12.0   HCT 35.5*       I have personallly reviewed all pertinent lab results from the last 24 hours.    Physical Exam:   Constitutional: She is oriented to person, place, and time. She appears well-developed and well-nourished.       Cardiovascular: Normal rate.     Pulmonary/Chest: Effort normal.        Abdominal: Soft. She exhibits no abdominal incision. There is no tenderness  (No fundal tenderness).             Musculoskeletal: She exhibits edema (1+ edema bilaterally). She exhibits no tenderness.       Neurological: She is alert and oriented to person, place, and time.     Psychiatric: She has a normal mood and affect.

## 2018-01-31 NOTE — ANESTHESIA POSTPROCEDURE EVALUATION
"Anesthesia Post Evaluation    Patient: Nallely Liang    Procedure(s) Performed: * No procedures listed *    Final Anesthesia Type: epidural  Patient location during evaluation: labor & delivery  Patient participation: Yes- Able to Participate  Level of consciousness: awake and alert  Post-procedure vital signs: reviewed and stable  Pain management: adequate  Airway patency: patent  PONV status at discharge: No PONV  Anesthetic complications: no      Cardiovascular status: blood pressure returned to baseline  Respiratory status: unassisted  Hydration status: euvolemic  Follow-up not needed.        Visit Vitals  /60   Pulse 76   Temp 37 °C (98.6 °F) (Oral)   Resp 16   Ht 5' 4" (1.626 m)   Wt 70.3 kg (155 lb)   LMP 04/07/2017   SpO2 97%   Breastfeeding? Unknown   BMI 26.61 kg/m²       Pain/Derrick Score: Pain Rating Prior to Med Admin: 2 (1/31/2018  5:55 AM)  Pain Rating Post Med Admin: 2 (1/31/2018 12:30 AM)      "

## 2018-01-31 NOTE — PROGRESS NOTES
[facility]  Obstetrics  Postpartum Progress Note    Patient Name: Nallely Liang  MRN: [id number]  Admission Date: 1/29/2018  Hospital Length of Stay: 2 days  Attending Physician: Lyssa Martinez, *  Primary Care Provider: Talon Turcios Jr, MD    Subjective:     Principal Problem:Vaginal delivery    Hospital course: Patient reached full dilatation and pushed for 2 hours, vacuum assisted delivery performed due to maternal exhaustion.  PPD #1: Patient c/o mild perineal soreness, minimal lochia, breastfeeding well. R/B/A of circumcision discussed with both parents, consent signed after questions answered.    Interval History:     She is doing well this morning. She is tolerating a regular diet without nausea or vomiting. She is voiding spontaneously. She is ambulating. She has passed flatus, and has not a BM. Vaginal bleeding is mild. She denies fever or chills. Abdominal pain is moderate and controlled with oral medications. She is breastfeeding. She desires circumcision for her male baby: yes.    Objective:     Vital Signs (Most Recent):  Temp: 98.6 °F (37 °C) (01/31/18 0800)  Pulse: 81 (01/31/18 0800)  Resp: 18 (01/31/18 0800)  BP: (!) 97/57 (01/31/18 0800)  SpO2: 98 % (01/31/18 0800) Vital Signs (24h Range):  Temp:  [98 °F (36.7 °C)-99.5 °F (37.5 °C)] 98.6 °F (37 °C)  Pulse:  [] 81  Resp:  [16-18] 18  SpO2:  [96 %-100 %] 98 %  BP: ()/() 97/57     Weight: 70.3 kg (155 lb)  Body mass index is 26.61 kg/m².      Intake/Output Summary (Last 24 hours) at 01/31/18 0923  Last data filed at 01/31/18 0100   Gross per 24 hour   Intake          1894.35 ml   Output             2313 ml   Net          -418.65 ml       Significant Labs:  Lab Results   Component Value Date    GROUPTRH O NEG 01/29/2018    HEPBSAG Negative 05/31/2017    STREPBCULT No Group B Streptococcus isolated 12/20/2017       Recent Labs  Lab 01/29/18  2100   HGB 12.0   HCT 35.5*       I have personallly reviewed  all pertinent lab results from the last 24 hours.    Physical Exam:   Constitutional: She is oriented to person, place, and time. She appears well-developed and well-nourished.       Cardiovascular: Normal rate.     Pulmonary/Chest: Effort normal.        Abdominal: Soft. She exhibits no abdominal incision. There is no tenderness (No fundal tenderness).             Musculoskeletal: She exhibits edema (1+ edema bilaterally). She exhibits no tenderness.       Neurological: She is alert and oriented to person, place, and time.     Psychiatric: She has a normal mood and affect.       Assessment/Plan:     32 y.o. female  for:    * Vaginal delivery    Continue postpartum care. Rhogam prn.            Disposition: As patient meets milestones, will plan to discharge in am.    Carolina Alcantar MD  Obstetrics  [facility]

## 2018-01-31 NOTE — LACTATION NOTE
Follow up care done, pt stated baby had drops of colostrum but baby did not latch prior latch.  Attempted to latch but baby was sleepy and disinterested.  Placed skin to skin.  Will follow up.      01/31/18 1130   LATCH Score   Latch 0-->too sleepy or reluctant, no latch achieved   Pain/Comfort Assessments   Pain Assessment Performed Yes   Pain Reassessment   Pain Rating Post Med Admin 0       Number Scale   Presence of Pain denies   Maternal Infant Feeding   Time Spent (min) 0-15 min   Latch Assistance yes   Breastfeeding Education milk expression, hand;importance of skin-to-skin contact   Feeding Infant   Feeding Tolerance/Success suck inconsistent;sleepy   Lactation Referrals   Lactation Consult Follow up   Lactation Interventions   Maternal Breastfeeding Support lactation counseling provided

## 2018-01-31 NOTE — LACTATION NOTE
"Seen pt for follow up care.  Latched the baby without shield and baby slips off, noted tight jaw.  Used nipple shield and baby gradually suckled but still inconsistent.  Encouraged skin to skin. Transferred on other breast and baby seems to suckle more frequently with pt doing more rhythmic breast compression.  Pump use and care discussed.  Will recommend post feeding pumping to stimulate breast if baby is inefficiently nursing.  To give expressed milk from hand expression and pumping by spoon.  Reviewed plan and pt agreed.  number on the board.      01/31/18 3240   LATCH Score   Latch 1-->repeated attempts, holds nipple in mouth, stimulate to suck   Audible Swallowing 0-->none   Type Of Nipple 2-->everted (after stimulation)   Comfort (Breast/Nipple) 2-->soft/nontender   Hold (Positioning) 0-->full assist (staff holds infant at breast)   Score (less than 7 for 2/more consecutive times, consult Lactation Consultant) 5       Number Scale   Presence of Pain denies   Maternal Infant Feeding   Maternal Emotional State assist needed   Infant Positioning cross-cradle;clutch/"football"   Time Spent (min) 15-30 min   Latch Assistance yes   Breastfeeding Education importance of skin-to-skin contact   Feeding Infant   Feeding Tolerance/Success suck inconsistent   Skin-to-Skin Contact During Feeding yes   Equipment Type/Education   Pump Type Symphony   Breast Pump Type double electric, hospital grade   Breast Pump Flange Type hard   Breast Pump Flange Size 24 mm   Breast Pumping Bilateral Breasts:   Lactation Referrals   Lactation Consult Follow up;Pump teaching   Lactation Interventions   Breastfeeding Assistance assisted with positioning;nipple shield utilized   Maternal Breastfeeding Support lactation counseling provided     "

## 2018-01-31 NOTE — DISCHARGE INSTRUCTIONS
Breastfeeding Discharge Instructions       Feed the baby at the earliest sign of hunger or comfort  o Hands to mouth, sucking motions  o Rooting or searching for something to suck on  o Dont wait for crying - it is a late sign of hunger and comfort.     The feedings may be 8-12 times per 24hrs and will not follow a schedule   Avoid pacifiers and bottles for the first 4 weeks   Alternate the breast you start the feeding with, or start with the breast that feels the fullest   Switch breasts when the baby takes himself off the breast or falls asleep   Keep offering breasts until the baby looks full, no longer gives hunger signs, and stays asleep when placed on his back in the crib   If the baby is sleepy and wont wake for a feeding, put the baby skin-to-skin dressed in a diaper against the mothers bare chest   Sleep near your baby   The baby should be positioned and latched on to the breast correctly  o Chest-to-chest, chin in the breast  o Babys lips are flipped outward  o Babys mouth is stretched open wide like a shout  o Babys sucking should feel like tugging to the mother  - The baby should be drinking at the breast:  o You should hear swallowing or gulping throughout the feeding  o You should see milk on the babys lips when he comes off the breast  o Your breasts should be softer when the baby is finished feeding  o The baby should look relaxed at the end of feedings  o After the 4th day and your milk is in:  o The babys poop should turn bright yellow and be loose, watery, and seedy  o The baby should have at least 3-4 poops the size of the palm of your hand per day  o The baby should have at least 6-8 wet diapers per day  o The urine should be light yellow in color  You should drink when you are thirsty and eat a healthy diet when you are    hungry.     Take naps to get the rest you need.   Take medications and/or drink alcohol only with permission of your obstetrician    or the babys  pediatrician.  You can also call the [facility],   ([phone]), Monday-Friday, 8am-5pm Central time, to get the most   up-to-date evidence-based information on the use of medications during   pregnancy and breastfeeding.      The baby should be examined by a pediatrician at 3-5 days of age.  Once your   milk comes in, the baby should be gaining at least ½ - 1oz each day and should be back to birthweight no later than 10-14 days of age.

## 2018-01-31 NOTE — LACTATION NOTE
Seen pt for lactation counseling.  Basics of breastfeeding reviewed.  Pt complains that baby clamps, instructed to call next feeding cue.   number on the board.      01/31/18 0947   Maternal Infant Assessment   Breast Shape pendulous   Breast Density soft   Areola elastic   Nipple(s) graspable       Number Scale   Presence of Pain denies   Maternal Infant Feeding   Maternal Emotional State assist needed   Time Spent (min) 0-15 min   Latch Assistance no   Breastfeeding Education importance of skin-to-skin contact;milk expression, hand;adequate infant intake;adequate milk volume   Feeding Infant   Feeding Tolerance/Success sleepy   Lactation Interventions   Maternal Breastfeeding Support lactation counseling provided

## 2018-01-31 NOTE — PROGRESS NOTES
Labor Progress Note        Subjective:      Patient currently doing well without complaints    Objective:      Temp:  [98 °F (36.7 °C)-99.5 °F (37.5 °C)] 99.3 °F (37.4 °C)  Pulse:  [63-95] 81  Resp:  [16-18] 18  SpO2:  [97 %-100 %] 99 %  BP: (104-125)/(61-81) 105/61  Body mass index is 26.61 kg/m².     General: no acute distress  Electronic Fetal Monitoring:  FHT: 125 bpm, moderate variability, accelerations present, decelerations absent   Category: 1                 TOCO: Contractions: regular, every 1-2 minutes     Cervix c/c/+1     Assessment:      1. IUP at  here for induction of labor     Plan:      1.  Epidural yes.   4. Membranes ruptured yes.   3. Cervix: c/c/+1     Will move towards delivery, Dr. Martinez made aware    Graciela Abel DO

## 2018-01-31 NOTE — HOSPITAL COURSE
Patient reached full dilatation and pushed for 2 hours, vacuum assisted delivery performed due to maternal exhaustion.  PPD #1: Patient c/o mild perineal soreness, minimal lochia, breastfeeding well. R/B/A of circumcision discussed with both parents, consent signed after questions answered.  PPD#2: patient is without unusual complaints, desires discharge. Circumcision done without complication.

## 2018-02-01 VITALS
SYSTOLIC BLOOD PRESSURE: 100 MMHG | OXYGEN SATURATION: 97 % | DIASTOLIC BLOOD PRESSURE: 67 MMHG | HEART RATE: 73 BPM | TEMPERATURE: 98 F | RESPIRATION RATE: 18 BRPM | HEIGHT: 64 IN | BODY MASS INDEX: 26.46 KG/M2 | WEIGHT: 155 LBS

## 2018-02-01 LAB — INJECT RH IG VOL PATIENT: NORMAL ML

## 2018-02-01 PROCEDURE — 25000003 PHARM REV CODE 250: Performed by: OBSTETRICS & GYNECOLOGY

## 2018-02-01 PROCEDURE — 99024 POSTOP FOLLOW-UP VISIT: CPT | Mod: ,,, | Performed by: OBSTETRICS & GYNECOLOGY

## 2018-02-01 PROCEDURE — 63600519 RHOGAM PHARM REV CODE 636 ALT 250 W HCPCS: Performed by: OBSTETRICS & GYNECOLOGY

## 2018-02-01 RX ADMIN — PRENATAL VIT W/ FE FUMARATE-FA TAB 27-0.8 MG 1 TABLET: 27-0.8 TAB at 09:02

## 2018-02-01 RX ADMIN — DOCUSATE SODIUM 200 MG: 100 CAPSULE, LIQUID FILLED ORAL at 09:02

## 2018-02-01 RX ADMIN — IBUPROFEN 600 MG: 600 TABLET, FILM COATED ORAL at 12:02

## 2018-02-01 RX ADMIN — HUMAN RHO(D) IMMUNE GLOBULIN 300 MCG: 300 INJECTION, SOLUTION INTRAMUSCULAR at 12:02

## 2018-02-01 RX ADMIN — IBUPROFEN 600 MG: 600 TABLET, FILM COATED ORAL at 06:02

## 2018-02-01 NOTE — PLAN OF CARE
Problem: Breastfeeding (Adult,Obstetrics,Pediatric)  Goal: Signs and Symptoms of Listed Potential Problems Will be Absent, Minimized or Managed (Breastfeeding)  Signs and symptoms of listed potential problems will be absent, minimized or managed by discharge/transition of care (reference Breastfeeding (Adult,Obstetrics,Pediatric) CPG).   Outcome: Ongoing (interventions implemented as appropriate)   02/01/18 1600   Breastfeeding   Problems Assessed (Breastfeeding)    Problems Present (Breastfeeding)       02/01/18 1600   Breastfeeding   Problems Assessed (Breastfeeding) all   Problems Present (Breastfeeding) ineffective breastfeeding;other (see comments)   Lactation plan of care for discharge. Discussed frequency, duration, shield use, pumping and feeding. Spoon feeding per parents request. FU call on Saturday for lactation update. Pt and SO acknowledged understanding

## 2018-02-01 NOTE — PROGRESS NOTES
[facility]  Obstetrics  Postpartum Progress Note    Patient Name: Nallely Liang  MRN: [id number]  Admission Date: 1/29/2018  Hospital Length of Stay: 3 days  Attending Physician: Lyssa Martinez, *  Primary Care Provider: Talon Turcios Jr, MD    Subjective:     Principal Problem:Vaginal delivery    Hospital course: Patient reached full dilatation and pushed for 2 hours, vacuum assisted delivery performed due to maternal exhaustion.  PPD #1: Patient c/o mild perineal soreness, minimal lochia, breastfeeding well. R/B/A of circumcision discussed with both parents, consent signed after questions answered.  PPD#2: patient is without unusual complaints, desires discharge. Circumcision done without complication.     She is doing well this morning. She is tolerating a regular diet without nausea or vomiting. She is voiding spontaneously. She is ambulating. She has passed flatus, and has not a BM. Vaginal bleeding is mild. She denies fever or chills. Abdominal pain is mild and controlled with oral medications. She is breastfeeding. She desires circumcision for her male baby: yes.    Objective:     Vital Signs (Most Recent):  Temp: 98.3 °F (36.8 °C) (02/01/18 0810)  Pulse: 73 (02/01/18 0810)  Resp: 18 (02/01/18 0810)  BP: 100/67 (02/01/18 0810)  SpO2: 97 % (02/01/18 0810) Vital Signs (24h Range):  Temp:  [98.2 °F (36.8 °C)-98.9 °F (37.2 °C)] 98.3 °F (36.8 °C)  Pulse:  [73-95] 73  Resp:  [18] 18  SpO2:  [97 %-99 %] 97 %  BP: (100-115)/(62-67) 100/67     Weight: 70.3 kg (155 lb)  Body mass index is 26.61 kg/m².    No intake or output data in the 24 hours ending 02/01/18 0957    Significant Labs:  Lab Results   Component Value Date    GROUPTRH O NEG 01/31/2018    HEPBSAG Negative 05/31/2017    STREPBCULT No Group B Streptococcus isolated 12/20/2017       Recent Labs  Lab 01/31/18  1032   HGB 10.2*   HCT 29.9*       I have personallly reviewed all pertinent lab results from the last 24  hours.    Physical Exam:   Constitutional: She is oriented to person, place, and time. She appears well-developed and well-nourished. No distress.       Cardiovascular: Normal rate.     Pulmonary/Chest: No respiratory distress.        Abdominal: Soft. She exhibits no distension. There is no tenderness. There is no rebound and no guarding.     Genitourinary:   Genitourinary Comments: Fundus firm, Nt, below umbilicus           Musculoskeletal: She exhibits no edema or tenderness.       Neurological: She is alert and oriented to person, place, and time.    Skin: Skin is warm and dry.    Psychiatric: She has a normal mood and affect.       Assessment/Plan:     32 y.o. female  for:    * Vaginal delivery    Continue postpartum care. Rhogam prn.  Discharge home today.             Disposition: As patient meets milestones, will plan to discharge today.    Sara Sorto MD  Obstetrics  [facility]

## 2018-02-01 NOTE — SUBJECTIVE & OBJECTIVE
Hospital course: Patient reached full dilatation and pushed for 2 hours, vacuum assisted delivery performed due to maternal exhaustion.  PPD #1: Patient c/o mild perineal soreness, minimal lochia, breastfeeding well. R/B/A of circumcision discussed with both parents, consent signed after questions answered.  PPD#2: patient is without unusual complaints, desires discharge. Circumcision done without complication.     She is doing well this morning. She is tolerating a regular diet without nausea or vomiting. She is voiding spontaneously. She is ambulating. She has passed flatus, and has not a BM. Vaginal bleeding is mild. She denies fever or chills. Abdominal pain is mild and controlled with oral medications. She is breastfeeding. She desires circumcision for her male baby: yes.    Objective:     Vital Signs (Most Recent):  Temp: 98.3 °F (36.8 °C) (02/01/18 0810)  Pulse: 73 (02/01/18 0810)  Resp: 18 (02/01/18 0810)  BP: 100/67 (02/01/18 0810)  SpO2: 97 % (02/01/18 0810) Vital Signs (24h Range):  Temp:  [98.2 °F (36.8 °C)-98.9 °F (37.2 °C)] 98.3 °F (36.8 °C)  Pulse:  [73-95] 73  Resp:  [18] 18  SpO2:  [97 %-99 %] 97 %  BP: (100-115)/(62-67) 100/67     Weight: 70.3 kg (155 lb)  Body mass index is 26.61 kg/m².    No intake or output data in the 24 hours ending 02/01/18 0957    Significant Labs:  Lab Results   Component Value Date    GROUPTRH O NEG 01/31/2018    HEPBSAG Negative 05/31/2017    STREPBCULT No Group B Streptococcus isolated 12/20/2017       Recent Labs  Lab 01/31/18  1032   HGB 10.2*   HCT 29.9*       I have personallly reviewed all pertinent lab results from the last 24 hours.    Physical Exam:   Constitutional: She is oriented to person, place, and time. She appears well-developed and well-nourished. No distress.       Cardiovascular: Normal rate.     Pulmonary/Chest: No respiratory distress.        Abdominal: Soft. She exhibits no distension. There is no tenderness. There is no rebound and no guarding.      Genitourinary:   Genitourinary Comments: Fundus firm, Nt, below umbilicus           Musculoskeletal: She exhibits no edema or tenderness.       Neurological: She is alert and oriented to person, place, and time.    Skin: Skin is warm and dry.    Psychiatric: She has a normal mood and affect.

## 2018-02-01 NOTE — LACTATION NOTE
Lactation education for discharge including spoon feeding and pump rental. Pt acknowledged understanding

## 2018-02-01 NOTE — DISCHARGE SUMMARY
[facility]  Obstetrics  Discharge Summary      Patient Name: Nallely Liang  MRN: [id number]  Admission Date: 2018  Hospital Length of Stay: 3 days  Discharge Date and Time:  2018 10:00 AM  Attending Physician: Cesar Martinez, *   Discharging Provider: Sara Sorto MD  Primary Care Provider: Talon Turcios Jr, MD    HPI: 31 yo  presents at 41.4 weeks for IOL.    * No surgery found *     Hospital Course:   Patient reached full dilatation and pushed for 2 hours, vacuum assisted delivery performed due to maternal exhaustion.  PPD #1: Patient c/o mild perineal soreness, minimal lochia, breastfeeding well. R/B/A of circumcision discussed with both parents, consent signed after questions answered.  PPD#2: patient is without unusual complaints, desires discharge. Circumcision done without complication.     Consults         Status Ordering Provider     Inpatient consult to Anesthesiology  Once     Provider:  (Not yet assigned)    Acknowledged CESAR MARTINEZ          Final Active Diagnoses:    Diagnosis Date Noted POA    PRINCIPAL PROBLEM:  Vaginal delivery [O80] 2018 Not Applicable      Problems Resolved During this Admission:    Diagnosis Date Noted Date Resolved POA    Post-dates pregnancy [O48.0] 2018 Yes    Post-dates pregnancy [O48.0] 2018 Yes    Polyhydramnios affecting pregnancy in third trimester [O40.3XX0] 2018 Yes    Post-term pregnancy, 40-42 weeks of gestation [O48.0]  2018 Yes        Labs:   CBC   Recent Labs  Lab 18  1032   WBC 17.72*   HGB 10.2*   HCT 29.9*          Feeding Method: breast    Immunizations     Date Immunization Status Dose Route/Site Given by    18 0004 MMR Incomplete 0.5 mL Subcutaneous/Left deltoid     18 0004 Tdap Incomplete 0.5 mL Intramuscular/Left deltoid     18 Rho (D) Immune Globulin - IM Incomplete 300 mcg Intramuscular/            Delivery:    Episiotomy: None   Lacerations: None   Repair suture:     Repair # of packets: 2   Blood loss (ml): 413     Birth information:  YOB: 2017   Time of birth: 10:36 PM   Sex: male   Delivery type: Vaginal, Vacuum (Extractor)   Gestational Age: 41w5d    Delivery Clinician:      Other providers:       Additional  information:  Forceps:    Vacuum:    Breech:    Observed anomalies      Living?:           APGARS  One minute Five minutes Ten minutes   Skin color:         Heart rate:         Grimace:         Muscle tone:         Breathing:         Totals: 8  9        Placenta: Delivered:       appearance    Pending Diagnostic Studies:     None          Discharged Condition: good    Disposition: Home or Self Care    Follow Up:  Follow-up Information     Lyssa Martinez MD In 6 weeks.    Specialty:  Obstetrics and Gynecology  Why:  Postpartum  Contact information:  [address] [facility]  [address] [place] [id number]  [phone]                 Patient Instructions:     Call MD for:  temperature >100.4     Call MD for:  persistent nausea and vomiting or diarrhea     Call MD for:  severe uncontrolled pain     Call MD for:  redness, tenderness, or signs of infection (pain, swelling, redness, odor or green/yellow discharge around incision site)     No dressing needed       Medications:  Current Discharge Medication List      START taking these medications    Details   docusate sodium (COLACE) 100 MG capsule Take 2 capsules (200 mg total) by mouth 2 (two) times daily as needed for Constipation.  Refills: 0      ibuprofen (ADVIL,MOTRIN) 600 MG tablet Take 1 tablet (600 mg total) by mouth every 6 (six) hours.  Qty: 45 tablet, Refills: 0      oxyCODONE-acetaminophen (PERCOCET) 5-325 mg per tablet Take 1 tablet by mouth every 4 (four) hours as needed.  Qty: 15 tablet, Refills: 0         CONTINUE these medications which have NOT CHANGED    Details   PRENATAL VIT/IRON FUM/FOLIC AC (PRENATAL 1+1  ORAL) Take by mouth.             Sara Sorto MD  Obstetrics  [facility]

## 2018-02-02 ENCOUNTER — NURSE TRIAGE (OUTPATIENT)
Dept: ADMINISTRATIVE | Facility: CLINIC | Age: 33
End: 2018-02-02

## 2018-02-02 NOTE — PLAN OF CARE
Problem: Patient Care Overview  Goal: Plan of Care Review  Outcome: Outcome(s) achieved Date Met: 02/01/18  VSS. Breastfeeding. Fundus firm. Bleeding light. Ambulating and voiding without difficulty. Passing flatus. Pain well controlled with current pain regimen. Rhogam administered. Motherbaby care guide reviewed. Pt aware to follow-up with Dr. Martinez in 6 weeks. Verbalized understanding. Denies questions or concerns. Mother to be wheeled off the unit with infant in lap.

## 2018-02-03 ENCOUNTER — TELEPHONE (OUTPATIENT)
Dept: LACTATION | Facility: CLINIC | Age: 33
End: 2018-02-03

## 2018-02-03 NOTE — TELEPHONE ENCOUNTER
"    Reason for Disposition   SEVERE vaginal bleeding (i.e., soaking 2 pads or tampons per hour and present 2 or more hours)    Answer Assessment - Initial Assessment Questions  1. ONSET: "Describe your bleeding: is it getting worse, staying the same, improving, or stopping and starting?"      Large golf ball sized clots  2. AMOUNT: "How much bleeding are you having today?"       - Slight: spotting, or pinkish / brownish mucous discharge; used less than one pad total     - MILD - less than one pad per hour; similar to menstrual bleeding     - MODERATE - blood clots; 1-2 pads/hour     - SEVERE: continuous red blood from vagina; large blood clots (e.g., golf ball size); > 2 pads/hour or not contained by pads      severe  3. ABDOMINAL PAIN: "Do you have any pain?" "How bad is the pain?" "What does it keep you from doing?"      - MILD -  doesn't interfere with normal activities, abdomen soft and not tender to touch      - MODERATE - interferes with normal activities or awakens from sleep, tender to touch      - SEVERE - excruciating pain, doubled over, unable to do any normal activities        No pain  4. HORMONES: "Are you taking any birth control medications?" (e.g., birth control pills, Depo-Provera)      n/a  5. BLOOD THINNERS: "Do you take any blood thinners?" (e.g., coumadin, aspirin)      No   6. OTHER SYMPTOMS: "Do you have any other symptoms?" (e.g., fever, chills, dizziness)      no  7. DELIVERY DATE: "When was your delivery date?" "Vaginal delivery or ?"      2018  8. BREASTFEEDING: "Are you breastfeeding?"      yes    Protocols used: ST POSTPARTUM - VAGINAL BLEEDING AND LOCHIA-A-AH      "

## 2018-02-03 NOTE — TELEPHONE ENCOUNTER
"Lactation note:  Returned patient's call from the warmline. Patient states her breast milk is coming in but infant still struggling to latch and nurse. The infant will sometimes latch with the nipple shield but not make any "sucking motions." The infant will then be bottle fed using paced bottle feeding about 30 ml; he takes about 15-20 minutes to take the bottle. The patient got 4.5 oz of milk out this morning with pumping. She is using the sym[employer] breast pump rented from the hospital. The patient states infant having adequate wet and dirty diapers and goes Tuesday for pediatrician follow up. Encouragement provided for mom. Discussed that infant is still learning to nurse; suggested doing skin to skin before feeding and catching infant at earliest hunger cue. When latching, mom can get a flow of milk started using hand expression with nipple shield in place and then work on getting a deep latch. Once infant latched, mom can perform breast compression to see if infant will respond to flow of milk. We also discussed starting infant sucking using a bottle of breast milk and then switching him to the breast. This has not been successful so far. If infant does not latch after a few minutes then mom should double pump both breasts until empty using hands-on pumping. Infant should be fed by bottle until content. Suggested sucking exercises before feeding to encourage infant to get into a proper sucking pattern. Discussed scheduling outpatient consultation this week coming up if things do not improve. Patient has LC phone number to call as needed.  "

## 2018-02-03 NOTE — TELEPHONE ENCOUNTER
Dr. Barajas states patient does not need to go to ED for occasional large clots, but she wants her to call back if she has bright red continuous bleeding as well as clots or fever. Patient verbalized understanding.

## 2018-02-06 ENCOUNTER — LACTATION CONSULT (OUTPATIENT)
Dept: LACTATION | Facility: CLINIC | Age: 33
End: 2018-02-06
Payer: COMMERCIAL

## 2018-02-06 PROCEDURE — 99199 UNLISTED SPECIAL SVC PX/RPRT: CPT | Mod: S$GLB,,,

## 2018-02-06 NOTE — PROGRESS NOTES
Reason for consultation: Mother pumping and bottlefeeding. Has not had much luck getting baby to breastfeed.      Delivery history  Hospital of birth:18  Delivery type:   Pregnancy complications:  No  Labor/birth complications:  No        Breastfeeding history: home  Baby is 7 days old and was past birth weight at MD visit today.  Feeding frequency: Baby has been mostly bottlefed breastmilk. Mother has had some success in last 24 hrs with nipple shield.  Feeding duration: 30-45 minutes a feeding. Very fussy at the breast and mother gets upset.  Use 1 or more breasts at each feeding:  No  Long, rhythmic sucks:  Sometimes but only got baby to breast with shield and only for the last 24 hours.  Longest stretch of sleep: 4 hours  Content and sleep at the end of feedings:  Yes  Able to latch onto both breasts without difficulty:  No  Diaper counts last 24 hours: 4-5  Urine, 6-7  Stool  Stool appearance: yellow, seedy, runny  Supplementing: Yes, mother is pumping 8 or more times a day and giving pumped milk  Breast pump use: Yes  Support /assistance at home: Dad is home with mother and is very helpful  Breastfeeding goals: to breastfeed baby and not have to use nipple shield or pump    Feeding assessment :  Pre-feeding weight: 8-1.6  Post- feeding weight: 8-3.6  Babys appearance: baby very content  Maternal ability to position and latch baby onto breasts: mother very awkward with feeding and needs practice. LC asst mother getting baby on left side without a shield.    Asymmetric latch achieved: Yes  Nutritive sucks observed: Yes  Babys behavior at breast: needs lots of compression and stimulation to keep baby active at the breast. Baby does try to go to sleep ted the breast.   L side: 20 minutes   R side: 0 minutes  Total feed time: 20 minutes  Babys post breastfeeding behavior: asleep          Recommended Interventions and Plan of Care for Baby Liang      x Breastfeed baby  on cue until  content at least 8-12 times in 24hrs.   Cluster feeds every 1-2hrs are common.    x Use the asymmetric latch as demonstrated during the consult.   Go to www.[employer]pulse and www.[employer] to view at home.    x  Use breast compression during pauses in sucking as demonstrated during the              consult.    x Observe for signs of milk transfer to baby:   wide pauses in the sucks   swallows throughout  the feedings   milk on the babys lips when removed from the breast   wet nipple as it comes out of the babys mouth   heavy breasts before a feeding and softer breasts after the feeding    x Try to latch the baby onto the breast without shield until latch occurs or until 10-15 min. Elapse. If unable to latch the baby deeply onto the breasts, then try with shield. If shield must be used, pump the breasts until empty and  store the milk for the next feeding. Mother may also try to calm baby by starting with the bottle and then switching to     x Supplement the baby with breastmilk by bottle until baby is content.       x Count and record the number of feedings, urine diapers, and dirty diapers every    24hrs.    x Clean all breastfeeding aids with warm soapy water after each use and sterilize each day.    X          Mother has a [employer] pump for a few more days and then plans to use her personal pump.    X Mother is very anxious and must stay calm at feedings. Dad will help with keeping mother calm.       Bethanie Moran RNC, IBCLC

## 2018-02-10 ENCOUNTER — TELEPHONE (OUTPATIENT)
Dept: LACTATION | Facility: CLINIC | Age: 33
End: 2018-02-10

## 2018-02-14 ENCOUNTER — LACTATION CONSULT (OUTPATIENT)
Dept: LACTATION | Facility: CLINIC | Age: 33
End: 2018-02-14
Payer: COMMERCIAL

## 2018-02-14 DIAGNOSIS — Z91.89 BREASTFEEDING PROBLEM: ICD-10-CM

## 2018-02-14 PROCEDURE — 99199 UNLISTED SPECIAL SVC PX/RPRT: CPT | Mod: S$GLB,,, | Performed by: OBSTETRICS & GYNECOLOGY

## 2018-02-15 NOTE — PROGRESS NOTES
"Presents for feeding evaluation. Provides excellent history and is accompanied by baby's father.  Reports difficulty with breastfeeding reporting baby does not latch effectively without nipple shield and baby is not active during feedings.   Baby often sleeps at breast. Also reports difficult "getting baby to open mouth wide enough."  Patient pumps 4-5 x/day and collects average 200-250 mL per pumping session, up to 1000 mL/day.  Patient feeds baby bottle after some breastfeeding and baby takes 20-70 mL, but patient does not know when to offer due to unclear cues from baby.    Baby is often fussy and gassy. Baby gets hiccups, burps, and passes gas frequently after feedings.     Oral assessment of baby reveals tight upper maxillary and lingual frenums with limited mobility of upper lip and tongue.  Baby weight in clothes and diaper 8 lb 5.6 oz (3788 g)    Baby latches with narrow gape to left breast with nipple shield in place. Unable to achieve adequate latch without use of shield. Some nutritive suckling and audible swallowing noted in spurts. Most swallows sound "gulpy" and baby is primarily inactive at breast, requiring stimulation and breast compression.  Patient appears confident and uses excellent positioning and latch technique.  After 20-25 mintues on left breast, baby's weight in same clothes 8 lb 6.5 oz (3814 g), indicating transfer of 26 mL  Baby then placed on right breast with similar results. Narrow gape, shield required. Very inactive at breast. After about 30 mintues, weight increased by 10 g.   Total feeding time at breast 55 mintues. Total milk transfer 36mL  Baby was then bottlefed expressed milk via Medela calma bottle by father. Paced feeding techniques used. Baby tolerated feeding well and took about 2 oz in 10 minutes. Baby satiated and fell asleep after feeding.    ASSESSMENT FINDINGS  Abundant milk supply  Inadequate milk transfer at breast  Limited mobility of baby's upper lip and tongue, " possibly contributing to feeding ineffectiveness at breast.    RECOMMENDATIONS  Speak with baby's pediatrician about assessment findings, inquiring of lip/tongue-tie and possibility of feeding therapy. References provided.  Breastfeed on cue, limiting time at breast to active feeding time or 10 minutes. Limiting time at breast due to inefficiency and desire not to fatigue infant at breast.  Complete feeding with expressed breastmilk to infant's satiety with each feeding.  Continue to pump, keeping an eye on supply. Increase pumping if supply appears to decrease.  Keep in contact with lactation for additional support and for adjustments in plan of care.

## 2018-03-05 NOTE — PROGRESS NOTES
Subjective:      Nallely Liang is a 32 y.o.  who presents for a postpartum visit.  She is status post  vacuum-assisted vaginal delivery 6 weeks ago.  Her hospitalization was not complicated.  She is breastfeeding.  She desires condoms for contraception.  She denies any signs and symptoms of postpartum depression.    Her last pap was NILM, HPV negative on 2016     No past medical history on file.    Current Outpatient Prescriptions:     docusate sodium (COLACE) 100 MG capsule, Take 2 capsules (200 mg total) by mouth 2 (two) times daily as needed for Constipation., Disp: , Rfl: 0    ibuprofen (ADVIL,MOTRIN) 600 MG tablet, Take 1 tablet (600 mg total) by mouth every 6 (six) hours., Disp: 45 tablet, Rfl: 0    oxyCODONE-acetaminophen (PERCOCET) 5-325 mg per tablet, Take 1 tablet by mouth every 4 (four) hours as needed., Disp: 15 tablet, Rfl: 0    PRENATAL VIT/IRON FUM/FOLIC AC (PRENATAL 1+1 ORAL), Take by mouth., Disp: , Rfl:       Objective:     General: healthy, no distress  Abdomen: Normal, benign.  External Genitalia: normal, well-healed, without lesions or masses  Vagina: normal, well-healed, physiologic discharge, without lesions  Cervix: normal, well-healed, without lesions  Uterus: normal size, well involuted, firm, non-tender  Adnexa: no masses palpable and nontender    Assessment:     Postpartum care and examination    Vaginal odor  -     metroNIDAZOLE (METROGEL) 0.75 % vaginal gel; Place 1 applicator vaginally once daily.  Dispense: 7 applicator; Refill: 0        Plan:     1. Return to clinic in 6 months for annual exam

## 2018-03-13 ENCOUNTER — POSTPARTUM VISIT (OUTPATIENT)
Dept: OBSTETRICS AND GYNECOLOGY | Facility: CLINIC | Age: 33
End: 2018-03-13
Payer: COMMERCIAL

## 2018-03-13 VITALS
DIASTOLIC BLOOD PRESSURE: 74 MMHG | WEIGHT: 130.63 LBS | SYSTOLIC BLOOD PRESSURE: 118 MMHG | HEIGHT: 64 IN | BODY MASS INDEX: 22.3 KG/M2

## 2018-03-13 DIAGNOSIS — N89.8 VAGINAL ODOR: ICD-10-CM

## 2018-03-13 PROCEDURE — 99999 PR PBB SHADOW E&M-EST. PATIENT-LVL III: CPT | Mod: PBBFAC,,, | Performed by: OBSTETRICS & GYNECOLOGY

## 2018-03-13 PROCEDURE — 0503F POSTPARTUM CARE VISIT: CPT | Mod: S$GLB,,, | Performed by: OBSTETRICS & GYNECOLOGY

## 2018-03-13 RX ORDER — METRONIDAZOLE 7.5 MG/G
1 GEL VAGINAL DAILY
Qty: 7 APPLICATOR | Refills: 0 | Status: SHIPPED | OUTPATIENT
Start: 2018-03-13 | End: 2018-03-20

## 2018-03-20 ENCOUNTER — TELEPHONE (OUTPATIENT)
Dept: OBSTETRICS AND GYNECOLOGY | Facility: CLINIC | Age: 33
End: 2018-03-20

## 2018-03-20 NOTE — TELEPHONE ENCOUNTER
Juan pp pt - pt is 7 weeks pp and was prescribed metrogel and was told to use it for 7 days. Pt still has some left in the tube and wants to know if she should use it until it is gone, also she wants to know if she should be concerned that she is still having the pain.

## 2018-03-20 NOTE — TELEPHONE ENCOUNTER
Pt has finished 7 days of Metrogel but still having vaginal burning.  Reassured her that it should get better within 3-5 days but if not call back.

## 2018-03-21 ENCOUNTER — LACTATION CONSULT (OUTPATIENT)
Dept: LACTATION | Facility: CLINIC | Age: 33
End: 2018-03-21
Payer: COMMERCIAL

## 2018-03-21 DIAGNOSIS — R63.39 BREAST FEEDING PROBLEM IN INFANT: Primary | ICD-10-CM

## 2018-03-21 PROCEDURE — 99199 UNLISTED SPECIAL SVC PX/RPRT: CPT | Mod: S$GLB,,,

## 2018-03-21 NOTE — PROGRESS NOTES
Lactation consult for Nallely Liang.   Ms Liang is here today for continued latch assistance. Infant is Lonny Liang.   Lonny is a 7 week old male infant. That d o b was [birth year].  He had a lip and tongue tie frenectomy at approximately 3.5-4 weeks of age. mother has since been seeing OT and Speech Pathology for exercises and therapy. Seh has also been predominately pumping and bottle feeding.   Mother states that she pumps 5 times a a day gets 45- 50 ounces total in a 24 hour period. She denies any problems with plug ducts or feeling over full.   Initially mother was using a Nipple shield to assist with latch, and since mother has just been pumping and bottle feeding infant.   She would like to try to get baby back to the breast.   Infant evita and fusses at the breast and does not want to latch.   Mother continues to do post frenectomy exercises with the baby.     today's wt with clothes : 11 # 6.2    Lc assisted mother with positioning infant at the,  Attempted to get baby to open mouth wide. Stroking upper lip with the nipple and chin to the breast. Baby evita  And pushes back , will not latch .  Infant moved away, offered bottle. Fussy at the bottle also. Calmed.  Attempted another latch using a nipple shield, bait and switch with bottle. Once again infant refused the breast. Becoming extremely upset at the breast.   Calmed infant.  SNS at the breast, with tip into nipple shield. Filled tip of shield with EBM. infant offered bottle , bait and switch, offered nipple shield. Some latch and sucking, then refused the breast. Evita and extremely uspset.     Plan:   Continue to pump and bottle feed infant.   Offer infant a lot skin to skin time, offer breast after infant content .  Try offering breast when baby is quite and content, use nipple shield if needed. Use SNS for flow volume. Offer breast as often as mother feels mentally and physically able to provide.   Emotional support provided.

## 2018-04-17 ENCOUNTER — TELEPHONE (OUTPATIENT)
Dept: FAMILY MEDICINE | Facility: CLINIC | Age: 33
End: 2018-04-17

## 2018-04-17 ENCOUNTER — TELEPHONE (OUTPATIENT)
Dept: OBSTETRICS AND GYNECOLOGY | Facility: CLINIC | Age: 33
End: 2018-04-17

## 2018-04-17 NOTE — TELEPHONE ENCOUNTER
Dr. Martinez-- pt states that she needs a note to return to work on the 25th sent to her portal. Pt's # [phone]

## 2018-04-17 NOTE — TELEPHONE ENCOUNTER
----- Message from Peggy Don sent at 4/17/2018  2:05 PM CDT -----  Name of Who is Calling: RONNY MIRAMONTES [[id number]]      What is the request in detail: Pt states she will come and  the forms on tomorrow. Do not mail      Can the clinic reply by [facility]: No      What Number to [facility] Back [facility] [phone]

## 2018-04-17 NOTE — LETTER
April 17, 2018      [facility]  [address], [address]  [facility] [address]  Phone: [phone]  Fax: [phone]       Patient: Nallely Liang   YOB: 1985  Date of Visit: 04/17/2018    To Whom It May Concern:    He Liang  was at [facility] on 04/17/2018. She may return to work on 04/25/2018 with no restrictions. If you have any questions or concerns, or if I can be of further assistance, please do not hesitate to contact me.    Sincerely,            Lyssa Martinez MD

## 2018-06-21 ENCOUNTER — TELEPHONE (OUTPATIENT)
Dept: OBSTETRICS AND GYNECOLOGY | Facility: CLINIC | Age: 33
End: 2018-06-21

## 2018-06-21 NOTE — TELEPHONE ENCOUNTER
Pt states intercourse is stil painful and has pain when wiping.  scheduled with Dr. Martinez tomorrow.

## 2018-06-21 NOTE — TELEPHONE ENCOUNTER
Juan pt - pt had a vaginal delivery on 1/29/18 and thinks her tears have still not completely healed.

## 2018-06-22 ENCOUNTER — OFFICE VISIT (OUTPATIENT)
Dept: OBSTETRICS AND GYNECOLOGY | Facility: CLINIC | Age: 33
End: 2018-06-22
Payer: COMMERCIAL

## 2018-06-22 VITALS
WEIGHT: 122.81 LBS | DIASTOLIC BLOOD PRESSURE: 64 MMHG | BODY MASS INDEX: 20.97 KG/M2 | SYSTOLIC BLOOD PRESSURE: 116 MMHG | HEIGHT: 64 IN

## 2018-06-22 DIAGNOSIS — N93.0 PCB (POST COITAL BLEEDING): Primary | ICD-10-CM

## 2018-06-22 DIAGNOSIS — R10.2 VULVAR PAIN: ICD-10-CM

## 2018-06-22 LAB
CANDIDA RRNA VAG QL PROBE: NEGATIVE
G VAGINALIS RRNA GENITAL QL PROBE: NEGATIVE
T VAGINALIS RRNA GENITAL QL PROBE: NEGATIVE

## 2018-06-22 PROCEDURE — 87480 CANDIDA DNA DIR PROBE: CPT

## 2018-06-22 PROCEDURE — 87510 GARDNER VAG DNA DIR PROBE: CPT

## 2018-06-22 PROCEDURE — 3008F BODY MASS INDEX DOCD: CPT | Mod: CPTII,S$GLB,, | Performed by: OBSTETRICS & GYNECOLOGY

## 2018-06-22 PROCEDURE — 99999 PR PBB SHADOW E&M-EST. PATIENT-LVL III: CPT | Mod: PBBFAC,,, | Performed by: OBSTETRICS & GYNECOLOGY

## 2018-06-22 PROCEDURE — 99213 OFFICE O/P EST LOW 20 MIN: CPT | Mod: S$GLB,,, | Performed by: OBSTETRICS & GYNECOLOGY

## 2018-06-22 NOTE — PROGRESS NOTES
"  Chief Complaint: Vulvar pain and post coital spotting     HPI:      Nallely Liang is a 33 y.o.  who presents complaining of vulvar pain and post coital spotting since the birth of her son 5 months ago. Has been unable to have normal intercourse because of pain. Also has some pain with wiping. Pain feels like a stinging sensation and is external.  Ms. Liang is currently sexually active with a single male partner.Patient does not have regular monthly menses because she is breast feeding.    ROS:     GENERAL: Denies fevers or chills. Feeling well overall.   ABDOMEN: Denies abdominal pain, constipation, diarrhea, nausea, vomiting, change in appetite.   URINARY: Denies frequency, dysuria, hematuria.  NEUROLOGIC: Denies syncope or weakness.     Physical Exam:      PHYSICAL EXAM:  /64   Ht 5' 4" (1.626 m)   Wt 55.7 kg (122 lb 12.7 oz)   Breastfeeding? Yes   BMI 21.08 kg/m²   Body mass index is 21.08 kg/m².     APPEARANCE: Well nourished, well developed, in no acute distress.  ABDOMEN: Soft.  No tenderness or masses.    PELVIC: Normal external genitalia without lesions.  Normal hair distribution.  Adequate perineal body, normal urethral meatus.  Vagina moist and well rugated with an area just to the right of the midline in the posterior introitus with granulation tissue buildup along the scar line from perineal lac. No evidence of infection. Wound is completely healed. There is a malodorous clear discharge. Cervix pink, without lesions, discharge or tenderness.  No significant cystocele or rectocele.  Bimanual exam shows uterus to be normal size, regular, mobile and nontender.  Adnexa without masses or tenderness.    EXTREMITIES: No edema.     Assessment/Plan:     PCB (post coital bleeding)  -     Vaginosis Screen by DNA Probe    Vulvar pain        -       Silver nitrate applied to granulation tissue. Patient instructed to return to clinic if symptoms are not completely resolved in 2 weeks. "     Counseling:       Use of the [employer] Patient Portal discussed and encouraged during today's visit.

## 2018-07-23 ENCOUNTER — TELEPHONE (OUTPATIENT)
Dept: OBSTETRICS AND GYNECOLOGY | Facility: CLINIC | Age: 33
End: 2018-07-23

## 2018-07-23 NOTE — TELEPHONE ENCOUNTER
Dr. Martinez-- pt states that she came in previously and discuss vaginal discomfort and was told to call in two weeks if she was still having discomfort. Pt's # [phone]

## 2018-07-31 ENCOUNTER — OFFICE VISIT (OUTPATIENT)
Dept: OBSTETRICS AND GYNECOLOGY | Facility: CLINIC | Age: 33
End: 2018-07-31
Payer: COMMERCIAL

## 2018-07-31 VITALS
BODY MASS INDEX: 21.34 KG/M2 | WEIGHT: 125 LBS | DIASTOLIC BLOOD PRESSURE: 76 MMHG | SYSTOLIC BLOOD PRESSURE: 104 MMHG | HEIGHT: 64 IN

## 2018-07-31 DIAGNOSIS — N93.0 PCB (POST COITAL BLEEDING): ICD-10-CM

## 2018-07-31 DIAGNOSIS — N94.10 DYSPAREUNIA IN FEMALE: Primary | ICD-10-CM

## 2018-07-31 PROCEDURE — 99999 PR PBB SHADOW E&M-EST. PATIENT-LVL III: CPT | Mod: PBBFAC,,, | Performed by: OBSTETRICS & GYNECOLOGY

## 2018-07-31 PROCEDURE — 99214 OFFICE O/P EST MOD 30 MIN: CPT | Mod: S$GLB,,, | Performed by: OBSTETRICS & GYNECOLOGY

## 2018-07-31 PROCEDURE — 3008F BODY MASS INDEX DOCD: CPT | Mod: CPTII,S$GLB,, | Performed by: OBSTETRICS & GYNECOLOGY

## 2018-08-01 NOTE — PROGRESS NOTES
"  Chief Complaint: PCB, Dyspareunia     HPI:      Nallely Liang is a 33 y.o.  who presents complaining of dyspareunia and an episode of post coital bleeding. She was seen for the same complaints in 2018 and some granulation tissue was noted at the vaginal introitus at the site of her second degree perineal repair. Silver nitrate was applied to the area. Since that time patient no longer has any external discomfort, and had no issues until she tried to have intercourse with her spouse. She had a burning sensation in the posterior vagina during intercourse and afterwards had a small amount of bleeding when wiping.  Ms. Liang is currently sexually active with a single male partner. She is currently breast feeding her 6 month old.    ROS:     GENERAL: Denies fevers or chills. Feeling well overall.   ABDOMEN: Denies abdominal pain, constipation, diarrhea, nausea, vomiting, change in appetite.   URINARY: Denies frequency, dysuria, hematuria.  NEUROLOGIC: Denies syncope or weakness.     Physical Exam:      PHYSICAL EXAM:  /76   Ht 5' 4" (1.626 m)   Wt 56.7 kg (125 lb)   Breastfeeding? Yes   BMI 21.46 kg/m²   Body mass index is 21.46 kg/m².     APPEARANCE: Well nourished, well developed, in no acute distress.  ABDOMEN: Soft.  No tenderness or masses.    PELVIC: Normal external genitalia without lesions.  Normal hair distribution.  Adequate perineal body, normal urethral meatus.  Vagina moist and well rugated with a moderate amount of granulation tissue in the posterior introitus. Cervix pink, without lesions, discharge or tenderness.  No significant cystocele or rectocele.  Bimanual exam shows uterus to be normal size, regular, mobile and nontender.  Adnexa without masses or tenderness.    Silver nitrate applied to granulation tissue.   EXTREMITIES: No edema.     Assessment/Plan:     Dyspareunia in female  -     conjugated estrogens (PREMARIN) vaginal cream; Place 1 g vaginally once daily.  " Dispense: 1 applicator; Refill: 11    PCB (post coital bleeding) - from granulation tissue. Will likely resolve once area completely healed.      Counseling:       Use of the [employer] Patient Portal discussed and encouraged during today's visit.

## 2018-09-14 ENCOUNTER — OFFICE VISIT (OUTPATIENT)
Dept: OBSTETRICS AND GYNECOLOGY | Facility: CLINIC | Age: 33
End: 2018-09-14
Payer: COMMERCIAL

## 2018-09-14 VITALS
SYSTOLIC BLOOD PRESSURE: 106 MMHG | WEIGHT: 122.56 LBS | DIASTOLIC BLOOD PRESSURE: 64 MMHG | HEIGHT: 64 IN | BODY MASS INDEX: 20.92 KG/M2

## 2018-09-14 DIAGNOSIS — Z01.419 ENCOUNTER FOR ANNUAL ROUTINE GYNECOLOGICAL EXAMINATION: Primary | ICD-10-CM

## 2018-09-14 PROCEDURE — 99395 PREV VISIT EST AGE 18-39: CPT | Mod: S$GLB,,, | Performed by: OBSTETRICS & GYNECOLOGY

## 2018-09-14 PROCEDURE — 99999 PR PBB SHADOW E&M-EST. PATIENT-LVL III: CPT | Mod: PBBFAC,,, | Performed by: OBSTETRICS & GYNECOLOGY

## 2018-09-14 NOTE — PROGRESS NOTES
"Chief Complaint: Well Woman Exam     HPI:      Nallely Liang is a 33 y.o.  who presents today for well woman exam.  LMP: No LMP recorded. Just stopped pumping/breast feeding yesterday.  No issues, problems, or complaints. Specifically, patient denies abnormal vaginal bleeding, discharge, pelvic pain, urinary problems, or changes in appetite. Ms. Liang is not currently sexually active. Has not attempted again since starting Premarin cream, but does feel like the cream helped her vagina heal. She is currently using abstinence, condoms for contraception. She declines STD screening today.    Previous Pap:  NILM, HPV negative (2016)    Ms. Liang confirms that she wears her seatbelt when riding in the car and does not text while driving.     OB History      Para Term  AB Living    1 1 1 0 0 1    SAB TAB Ectopic Multiple Live Births    0 0 0 0 1        Obstetric Comments    Menarche age 14.   Menses normal and regular.  History of abnormal PAP smear: NO.  History of sexually transmitted disease:  NO            ROS:     GENERAL: Denies unintentional weight gain or weight loss. Feeling well overall.   SKIN: Denies rash or lesions.   HEENT: Denies headaches, or vision changes.   CARDIOVASCULAR: Denies palpitations or chest pain.   RESPIRATORY: Denies shortness of breath or dyspnea on exertion.  BREASTS: Denies pain, lumps, or nipple discharge.   ABDOMEN: Denies abdominal pain, constipation, diarrhea, nausea, vomiting, change in appetite.  URINARY: Denies frequency, dysuria, hematuria.  NEUROLOGIC: Denies syncope or weakness.   PSYCHIATRIC: Denies depression, anxiety or mood swings.    Physical Exam:      PHYSICAL EXAM:  /64   Ht 5' 4" (1.626 m)   Wt 55.6 kg (122 lb 9.2 oz)   Breastfeeding? No   BMI 21.04 kg/m²   Body mass index is 21.04 kg/m².     APPEARANCE: Well nourished, well developed, in no acute distress.  PSYCH: Appropriate mood and affect.  SKIN: No acne or hirsutism  NECK: " Neck symmetric without masses or thyromegaly  NODES: No inguinal, axillary, or supraclavicular lymph node enlargement  ABDOMEN: Soft.  No tenderness or masses.    CARDIOVASCULAR: No edema of peripheral extremities  BREASTS: Symmetrical, no skin changes or visible lesions.  No palpable masses or nipple discharge bilaterally.  PELVIC: Normal external genitalia without lesions.  Normal hair distribution.  Adequate perineal body, normal urethral meatus.  Vagina moist and well rugated without lesions or discharge.  Cervix pink, without lesions, discharge or tenderness.  No significant cystocele or rectocele.  Bimanual exam shows uterus to be normal size, regular, mobile and nontender.  Adnexa without masses or tenderness.        Assessment/Plan:     Encounter for annual routine gynecological examination      Counseling:     Patient was counseled today on current ASCCP pap guidelines, the recommendation for yearly pelvic exams, healthy diet and exercise routines, breast self awareness.She is to see her PCP for other health maintenance.     Use of the [employer] Patient Portal discussed and encouraged during today's visit.

## 2019-08-29 ENCOUNTER — OFFICE VISIT (OUTPATIENT)
Dept: OPTOMETRY | Facility: CLINIC | Age: 34
End: 2019-08-29
Payer: COMMERCIAL

## 2019-08-29 DIAGNOSIS — Z46.0 FITTING AND ADJUSTMENT OF SPECTACLES AND CONTACT LENSES: ICD-10-CM

## 2019-08-29 DIAGNOSIS — H52.13 MYOPIA, BILATERAL: Primary | ICD-10-CM

## 2019-08-29 PROCEDURE — 92015 PR REFRACTION: ICD-10-PCS | Mod: S$GLB,,, | Performed by: OPTOMETRIST

## 2019-08-29 PROCEDURE — 99999 PR PBB SHADOW E&M-EST. PATIENT-LVL I: ICD-10-PCS | Mod: PBBFAC,,, | Performed by: OPTOMETRIST

## 2019-08-29 PROCEDURE — 92015 DETERMINE REFRACTIVE STATE: CPT | Mod: S$GLB,,, | Performed by: OPTOMETRIST

## 2019-08-29 PROCEDURE — 92004 COMPRE OPH EXAM NEW PT 1/>: CPT | Mod: S$GLB,,, | Performed by: OPTOMETRIST

## 2019-08-29 PROCEDURE — 92004 PR EYE EXAM, NEW PATIENT,COMPREHESV: ICD-10-PCS | Mod: S$GLB,,, | Performed by: OPTOMETRIST

## 2019-08-29 PROCEDURE — 92310 PR CONTACT LENS FITTING (NO CHANGE): ICD-10-PCS | Mod: CSM,,, | Performed by: OPTOMETRIST

## 2019-08-29 PROCEDURE — 99999 PR PBB SHADOW E&M-EST. PATIENT-LVL II: CPT | Mod: PBBFAC,,, | Performed by: OPTOMETRIST

## 2019-08-29 PROCEDURE — 99999 PR PBB SHADOW E&M-EST. PATIENT-LVL I: CPT | Mod: PBBFAC,,, | Performed by: OPTOMETRIST

## 2019-08-29 PROCEDURE — 99999 PR PBB SHADOW E&M-EST. PATIENT-LVL II: ICD-10-PCS | Mod: PBBFAC,,, | Performed by: OPTOMETRIST

## 2019-08-29 PROCEDURE — 92310 CONTACT LENS FITTING OU: CPT | Mod: CSM,,, | Performed by: OPTOMETRIST

## 2019-08-29 NOTE — PROGRESS NOTES
Assessment /Plan     For exam results, see Encounter Report.         Myopia, bilateral              Rx specs  Fitting and adjustment of spectacles and contact lenses              Dispensed trials of Ciba daily total one               OK to order if happy     RTC 1-2 years DFE, sooner PRN

## 2019-09-24 ENCOUNTER — OFFICE VISIT (OUTPATIENT)
Dept: OBSTETRICS AND GYNECOLOGY | Facility: CLINIC | Age: 34
End: 2019-09-24
Payer: COMMERCIAL

## 2019-09-24 VITALS
BODY MASS INDEX: 21.47 KG/M2 | DIASTOLIC BLOOD PRESSURE: 62 MMHG | HEIGHT: 64 IN | SYSTOLIC BLOOD PRESSURE: 108 MMHG | WEIGHT: 125.75 LBS

## 2019-09-24 DIAGNOSIS — Z11.51 SCREENING FOR HPV (HUMAN PAPILLOMAVIRUS): ICD-10-CM

## 2019-09-24 DIAGNOSIS — Z12.4 ENCOUNTER FOR PAPANICOLAOU SMEAR FOR CERVICAL CANCER SCREENING: Primary | ICD-10-CM

## 2019-09-24 PROCEDURE — 87624 HPV HI-RISK TYP POOLED RSLT: CPT

## 2019-09-24 PROCEDURE — 99395 PR PREVENTIVE VISIT,EST,18-39: ICD-10-PCS | Mod: S$GLB,,, | Performed by: OBSTETRICS & GYNECOLOGY

## 2019-09-24 PROCEDURE — 99999 PR PBB SHADOW E&M-EST. PATIENT-LVL III: CPT | Mod: PBBFAC,,, | Performed by: OBSTETRICS & GYNECOLOGY

## 2019-09-24 PROCEDURE — 99395 PREV VISIT EST AGE 18-39: CPT | Mod: S$GLB,,, | Performed by: OBSTETRICS & GYNECOLOGY

## 2019-09-24 PROCEDURE — 88175 CYTOPATH C/V AUTO FLUID REDO: CPT

## 2019-09-24 PROCEDURE — 99999 PR PBB SHADOW E&M-EST. PATIENT-LVL III: ICD-10-PCS | Mod: PBBFAC,,, | Performed by: OBSTETRICS & GYNECOLOGY

## 2019-09-24 NOTE — PROGRESS NOTES
"Chief Complaint: Well Woman Exam     HPI:      Nallely Liang is a 34 y.o.  who presents today for well woman exam.  LMP: Patient's last menstrual period was 2019 (exact date).  No issues, problems, or complaints. Specifically, patient denies abnormal vaginal bleeding, discharge, pelvic pain, urinary problems, or changes in appetite. Ms. Liang is currently sexually active with a single male partner. She is currently using condoms for contraception. She declines STD screening today.    Previous Pap:  NILM, HPV negative (2016)    Ms. Liang confirms that she wears her seatbelt when riding in the car and does not text while driving.     OB History        1    Para   1    Term   1       0    AB   0    Living   1       SAB   0    TAB   0    Ectopic   0    Multiple   0    Live Births   1           Obstetric Comments   Menarche age 14.   Menses normal and regular.  History of abnormal PAP smear: NO.  History of sexually transmitted disease:  NO               ROS:     GENERAL: Denies unintentional weight gain or weight loss. Feeling well overall.   SKIN: Denies rash or lesions.   HEENT: Denies headaches, or vision changes.   CARDIOVASCULAR: Denies palpitations or chest pain.   RESPIRATORY: Denies shortness of breath or dyspnea on exertion.  BREASTS: Denies pain, lumps, or nipple discharge.   ABDOMEN: Denies abdominal pain, constipation, diarrhea, nausea, vomiting, change in appetite.  URINARY: Denies frequency, dysuria, hematuria.  NEUROLOGIC: Denies syncope or weakness.   PSYCHIATRIC: Denies depression, anxiety or mood swings.    Physical Exam:      PHYSICAL EXAM:  /62   Ht 5' 4" (1.626 m)   Wt 57.1 kg (125 lb 12.4 oz)   LMP 2019 (Exact Date)   Breastfeeding? No   BMI 21.59 kg/m²   Body mass index is 21.59 kg/m².     APPEARANCE: Well nourished, well developed, in no acute distress.  PSYCH: Appropriate mood and affect.  SKIN: No acne or hirsutism  NECK: Neck symmetric " without masses or thyromegaly  NODES: No inguinal, axillary, or supraclavicular lymph node enlargement  ABDOMEN: Soft.  No tenderness or masses.    CARDIOVASCULAR: No edema of peripheral extremities  BREASTS: Symmetrical, no skin changes or visible lesions.  No palpable masses or nipple discharge bilaterally.  PELVIC: Normal external genitalia without lesions.  Normal hair distribution.  Adequate perineal body, normal urethral meatus.  Vagina moist and well rugated without lesions or discharge.  Cervix pink, without lesions, discharge or tenderness.  No significant cystocele or rectocele.  Bimanual exam shows uterus to be normal size, regular, mobile and nontender.  Adnexa without masses or tenderness.      Assessment/Plan:     Encounter for Papanicolaou smear for cervical cancer screening  -     Liquid-based pap smear, screening    Screening for HPV (human papillomavirus)  -     HPV High Risk Genotypes, PCR      Counseling:     Patient was counseled today on current ASCCP pap guidelines, the recommendation for yearly pelvic exams, healthy diet and exercise routines, breast self awareness.She is to see her PCP for other health maintenance.     Use of the [facility] Patient Portal discussed and encouraged during today's visit.

## 2019-10-01 LAB
HPV HR 12 DNA CVX QL NAA+PROBE: NEGATIVE
HPV16 AG SPEC QL: NEGATIVE
HPV18 DNA SPEC QL NAA+PROBE: NEGATIVE

## 2020-01-16 ENCOUNTER — PATIENT MESSAGE (OUTPATIENT)
Dept: FAMILY MEDICINE | Facility: CLINIC | Age: 35
End: 2020-01-16

## 2020-02-20 ENCOUNTER — OFFICE VISIT (OUTPATIENT)
Dept: OBSTETRICS AND GYNECOLOGY | Facility: CLINIC | Age: 35
End: 2020-02-20
Payer: COMMERCIAL

## 2020-02-20 VITALS
SYSTOLIC BLOOD PRESSURE: 90 MMHG | BODY MASS INDEX: 21.86 KG/M2 | WEIGHT: 128.06 LBS | HEIGHT: 64 IN | DIASTOLIC BLOOD PRESSURE: 70 MMHG

## 2020-02-20 DIAGNOSIS — N92.6 MISSED MENSES: Primary | ICD-10-CM

## 2020-02-20 DIAGNOSIS — Z32.01 POSITIVE URINE PREGNANCY TEST: ICD-10-CM

## 2020-02-20 LAB
B-HCG UR QL: POSITIVE
CTP QC/QA: YES

## 2020-02-20 PROCEDURE — 99999 PR PBB SHADOW E&M-EST. PATIENT-LVL II: ICD-10-PCS | Mod: PBBFAC,,, | Performed by: NURSE PRACTITIONER

## 2020-02-20 PROCEDURE — 99999 PR PBB SHADOW E&M-EST. PATIENT-LVL II: CPT | Mod: PBBFAC,,, | Performed by: NURSE PRACTITIONER

## 2020-02-20 PROCEDURE — 81025 URINE PREGNANCY TEST: CPT | Mod: S$GLB,,, | Performed by: NURSE PRACTITIONER

## 2020-02-20 PROCEDURE — 99213 PR OFFICE/OUTPT VISIT, EST, LEVL III, 20-29 MIN: ICD-10-PCS | Mod: S$GLB,,, | Performed by: NURSE PRACTITIONER

## 2020-02-20 PROCEDURE — 99213 OFFICE O/P EST LOW 20 MIN: CPT | Mod: S$GLB,,, | Performed by: NURSE PRACTITIONER

## 2020-02-20 PROCEDURE — 3008F BODY MASS INDEX DOCD: CPT | Mod: CPTII,S$GLB,, | Performed by: NURSE PRACTITIONER

## 2020-02-20 PROCEDURE — 81025 POCT URINE PREGNANCY: ICD-10-PCS | Mod: S$GLB,,, | Performed by: NURSE PRACTITIONER

## 2020-02-20 PROCEDURE — 3008F PR BODY MASS INDEX (BMI) DOCUMENTED: ICD-10-PCS | Mod: CPTII,S$GLB,, | Performed by: NURSE PRACTITIONER

## 2020-02-20 NOTE — PROGRESS NOTES
"Chief Complaint: Absence of Menses     (Dr. Martinez patient)    Patient's last menstrual period was 2020.,   EDC: 10/13/2020,   GA:  6w 2d,  based upon LMP.      Nallely Liang is a 34 y.o. female  presents with complaint of absence of menstruation and (+) home UPT on 20.  States her menstrual cycles are every 31 days.  She denies nausea; denies vomiting.  Denies vaginal bleeding or abdominal pain.    UPT is: positive.     Last Pap:  10/1/2019     History reviewed. No pertinent past medical history.  Past Surgical History:   Procedure Laterality Date    ANTERIOR CRUCIATE LIGAMENT REPAIR Left      Social History     Tobacco Use    Smoking status: Never Smoker    Smokeless tobacco: Never Used   Substance Use Topics    Alcohol use: Yes     Alcohol/week: 4.0 standard drinks     Types: 4 Standard drinks or equivalent per week     Comment: occasional     Drug use: No     Family History   Problem Relation Age of Onset    Miscarriages / Stillbirths Maternal Grandmother     Stroke Maternal Grandfather     Heart attack Maternal Grandfather     No Known Problems Father     No Known Problems Mother     No Known Problems Brother     No Known Problems Paternal Grandmother     Heart attack Paternal Grandfather     No Known Problems Brother     No Known Problems Brother      OB History    Para Term  AB Living   2 1 1 0 0 1   SAB TAB Ectopic Multiple Live Births   0 0 0 0 1      # Outcome Date GA Lbr Christophe/2nd Weight Sex Delivery Anes PTL Lv   2 Current            1 Term 18 41w5d  3.63 kg (8 lb) M Vag-Vacuum EPI N MICHAELA      Obstetric Comments   Menarche age 14.    Menses normal and regular.   History of abnormal PAP smear: NO.   History of sexually transmitted disease:  NO       BP 90/70   Ht 5' 4" (1.626 m)   Wt 58.1 kg (128 lb 1.4 oz)   LMP 2020   BMI 21.99 kg/m²   Body mass index is 21.99 kg/m².     ROS:  GENERAL: No weight changes. No swelling. No " fatigue. No fever.  CARDIOVASCULAR: No chest pain. No shortness of breath. No leg cramps.   NEUROLOGICAL: No headaches. No vision changes.  BREASTS: No pain. No lumps. No discharge.  ABDOMEN: No pain. No diarrhea. No constipation.  REPRODUCTIVE: No abnormal bleeding.   VULVA: No pain. No lesions. No itching.  VAGINA: No relaxation. No itching. No odor. No discharge. No lesions.  URINARY: No incontinence. No nocturia. No frequency. No dysuria.    MEDICATIONS AND ALLERGIES:  Reviewed     PE:   APPEARANCE: Well nourished, well developed, in no acute distress.  AFFECT: WNL, alert and oriented x 3.  NECK: Neck symmetric without masses or thyromegaly.   CHEST: Good respiratory effort.     Nausea and vomiting in pregnancy:    -  Education regarding lifestyle and dietary modifications    -  Advised use of B6/Unisom. Pt will notify us if no relief/worsening symptoms, will consider Zofran if needed.  (To help with nausea and vomiting, 25mg of Vitamin B6 taken 3-4 times a day along with 12.5 mg of Unisom taken 3-4 times a day helps. Unisom only comes in 25mg tabs, so you will have to cut those in half. These are the same ingredients that are in the prescription versions!  Anything nisha will help, along with small frequent meals instead of larger less frequent meals help. Stay hydrated.)     To prevent migraines (or moderate to severe headaches), you may take both of the following together, once daily by mouth:                 * Magnesium Oxide 400 mg daily  * Riboflavin (Vit. B-2) 200 mg daily      1st TRIMESTER COUNSELING: Discussed and packet provided:  * Common complaints of pregnancy  * HIV and other routine prenatal tests including  genetic screening  * Risk factors identified by prenatal history;  Nutrition and weight gain counseling  * Oriented to practice: discussed anticipated course of prenatal care  * Indications for Ultrasound  * Childbirth classes/Hospital facilities   * Toxoplasmosis precautions  (Cats/Raw Meat);  Foods to avoid in pregnancy (i.e. sushi, fish high in mercury, deli meat, and unpasteurized cheeses)  * Sexual activity and exercise; Caffeine consumption (<300 mg/day)  * Environmental/Work hazards; Travel (including Zika Precautions)  * Tobacco, alcohol, and drug use  * Use of any medications (Including supplements, Vitamins, Herbs, or OTC Drugs)  * Domestic violence; Seat belt use & not texting while driving    *  Connected Moms-- to be discussed per MD at Initial OB visit.    ASSESSMENT and PLAN:  Missed menses  -     POCT urine pregnancy  -     US OB/GYN Procedure ([facility]) - Extended List - Future; Future    Positive urine pregnancy test      Follow up in about 2 weeks (around 3/5/2020) for F/U with physician for Initial OB visit & U/S.    ~25 minutes spent with pt Face to Face with >50% of visit spent on education/counseling.

## 2020-03-03 ENCOUNTER — PATIENT MESSAGE (OUTPATIENT)
Dept: OBSTETRICS AND GYNECOLOGY | Facility: CLINIC | Age: 35
End: 2020-03-03

## 2020-03-09 ENCOUNTER — PROCEDURE VISIT (OUTPATIENT)
Dept: OBSTETRICS AND GYNECOLOGY | Facility: CLINIC | Age: 35
End: 2020-03-09
Payer: COMMERCIAL

## 2020-03-09 ENCOUNTER — INITIAL PRENATAL (OUTPATIENT)
Dept: OBSTETRICS AND GYNECOLOGY | Facility: CLINIC | Age: 35
End: 2020-03-09
Payer: COMMERCIAL

## 2020-03-09 ENCOUNTER — LAB VISIT (OUTPATIENT)
Dept: LAB | Facility: HOSPITAL | Age: 35
End: 2020-03-09
Attending: OBSTETRICS & GYNECOLOGY
Payer: COMMERCIAL

## 2020-03-09 VITALS
BODY MASS INDEX: 21.95 KG/M2 | DIASTOLIC BLOOD PRESSURE: 68 MMHG | WEIGHT: 127.88 LBS | SYSTOLIC BLOOD PRESSURE: 110 MMHG

## 2020-03-09 DIAGNOSIS — Z36.89 ESTABLISH GESTATIONAL AGE, ULTRASOUND: ICD-10-CM

## 2020-03-09 DIAGNOSIS — Z3A.08 8 WEEKS GESTATION OF PREGNANCY: ICD-10-CM

## 2020-03-09 DIAGNOSIS — O26.899 RH NEGATIVE, ANTEPARTUM: ICD-10-CM

## 2020-03-09 DIAGNOSIS — Z67.91 RH NEGATIVE, ANTEPARTUM: ICD-10-CM

## 2020-03-09 DIAGNOSIS — Z34.91 NORMAL PREGNANCY IN FIRST TRIMESTER: Primary | ICD-10-CM

## 2020-03-09 DIAGNOSIS — N92.6 MISSED MENSES: ICD-10-CM

## 2020-03-09 LAB
ABO + RH BLD: NORMAL
BASOPHILS # BLD AUTO: 0.04 K/UL (ref 0–0.2)
BASOPHILS NFR BLD: 0.4 % (ref 0–1.9)
BLD GP AB SCN CELLS X3 SERPL QL: NORMAL
DIFFERENTIAL METHOD: ABNORMAL
EOSINOPHIL # BLD AUTO: 0 K/UL (ref 0–0.5)
EOSINOPHIL NFR BLD: 0.4 % (ref 0–8)
ERYTHROCYTE [DISTWIDTH] IN BLOOD BY AUTOMATED COUNT: 14.6 % (ref 11.5–14.5)
HCT VFR BLD AUTO: 35.6 % (ref 37–48.5)
HGB BLD-MCNC: 11.4 G/DL (ref 12–16)
IMM GRANULOCYTES # BLD AUTO: 0.04 K/UL (ref 0–0.04)
IMM GRANULOCYTES NFR BLD AUTO: 0.4 % (ref 0–0.5)
LYMPHOCYTES # BLD AUTO: 1.7 K/UL (ref 1–4.8)
LYMPHOCYTES NFR BLD: 18.7 % (ref 18–48)
MCH RBC QN AUTO: 27.4 PG (ref 27–31)
MCHC RBC AUTO-ENTMCNC: 32 G/DL (ref 32–36)
MCV RBC AUTO: 86 FL (ref 82–98)
MONOCYTES # BLD AUTO: 0.4 K/UL (ref 0.3–1)
MONOCYTES NFR BLD: 4 % (ref 4–15)
NEUTROPHILS # BLD AUTO: 7 K/UL (ref 1.8–7.7)
NEUTROPHILS NFR BLD: 76.1 % (ref 38–73)
NRBC BLD-RTO: 0 /100 WBC
PLATELET # BLD AUTO: 319 K/UL (ref 150–350)
PMV BLD AUTO: 11.2 FL (ref 9.2–12.9)
RBC # BLD AUTO: 4.16 M/UL (ref 4–5.4)
WBC # BLD AUTO: 9.22 K/UL (ref 3.9–12.7)

## 2020-03-09 PROCEDURE — 87088 URINE BACTERIA CULTURE: CPT

## 2020-03-09 PROCEDURE — 86850 RBC ANTIBODY SCREEN: CPT

## 2020-03-09 PROCEDURE — 85025 COMPLETE CBC W/AUTO DIFF WBC: CPT

## 2020-03-09 PROCEDURE — 0500F PR INITIAL PRENATAL CARE VISIT: ICD-10-PCS | Mod: S$GLB,,, | Performed by: OBSTETRICS & GYNECOLOGY

## 2020-03-09 PROCEDURE — 86762 RUBELLA ANTIBODY: CPT

## 2020-03-09 PROCEDURE — 90471 IMMUNIZATION ADMIN: CPT | Mod: S$GLB,,, | Performed by: OBSTETRICS & GYNECOLOGY

## 2020-03-09 PROCEDURE — 87077 CULTURE AEROBIC IDENTIFY: CPT | Mod: 59

## 2020-03-09 PROCEDURE — 76801 PR US, OB <14WKS, TRANSABD, SINGLE GESTATION: ICD-10-PCS | Mod: S$GLB,,, | Performed by: OBSTETRICS & GYNECOLOGY

## 2020-03-09 PROCEDURE — 90686 IIV4 VACC NO PRSV 0.5 ML IM: CPT | Mod: S$GLB,,, | Performed by: OBSTETRICS & GYNECOLOGY

## 2020-03-09 PROCEDURE — 86592 SYPHILIS TEST NON-TREP QUAL: CPT

## 2020-03-09 PROCEDURE — 86703 HIV-1/HIV-2 1 RESULT ANTBDY: CPT

## 2020-03-09 PROCEDURE — 87491 CHLMYD TRACH DNA AMP PROBE: CPT

## 2020-03-09 PROCEDURE — 0500F INITIAL PRENATAL CARE VISIT: CPT | Mod: S$GLB,,, | Performed by: OBSTETRICS & GYNECOLOGY

## 2020-03-09 PROCEDURE — 87086 URINE CULTURE/COLONY COUNT: CPT

## 2020-03-09 PROCEDURE — 87186 SC STD MICRODIL/AGAR DIL: CPT | Mod: 59

## 2020-03-09 PROCEDURE — 90686 FLU VACCINE (QUAD) GREATER THAN OR EQUAL TO 3YO PRESERVATIVE FREE IM: ICD-10-PCS | Mod: S$GLB,,, | Performed by: OBSTETRICS & GYNECOLOGY

## 2020-03-09 PROCEDURE — 90471 FLU VACCINE (QUAD) GREATER THAN OR EQUAL TO 3YO PRESERVATIVE FREE IM: ICD-10-PCS | Mod: S$GLB,,, | Performed by: OBSTETRICS & GYNECOLOGY

## 2020-03-09 PROCEDURE — 87340 HEPATITIS B SURFACE AG IA: CPT

## 2020-03-09 PROCEDURE — 99999 PR PBB SHADOW E&M-EST. PATIENT-LVL II: CPT | Mod: PBBFAC,,, | Performed by: OBSTETRICS & GYNECOLOGY

## 2020-03-09 PROCEDURE — 99999 PR PBB SHADOW E&M-EST. PATIENT-LVL II: ICD-10-PCS | Mod: PBBFAC,,, | Performed by: OBSTETRICS & GYNECOLOGY

## 2020-03-09 PROCEDURE — 76801 OB US < 14 WKS SINGLE FETUS: CPT | Mod: S$GLB,,, | Performed by: OBSTETRICS & GYNECOLOGY

## 2020-03-09 RX ORDER — MAGNESIUM 30 MG
TABLET ORAL ONCE
COMMUNITY
End: 2020-04-08

## 2020-03-09 RX ORDER — PYRIDOXINE HCL (VITAMIN B6) 25 MG
25 TABLET ORAL DAILY
COMMUNITY
End: 2020-04-08

## 2020-03-09 NOTE — PROGRESS NOTES
8w6d without major complaints.   Flu shot given today.  Genetic screening discussed and pt would like to do Quad screening.   RTC in 4 weeks for routine PNC.

## 2020-03-10 LAB
C TRACH DNA SPEC QL NAA+PROBE: NOT DETECTED
HBV SURFACE AG SERPL QL IA: NEGATIVE
HIV 1+2 AB+HIV1 P24 AG SERPL QL IA: NEGATIVE
N GONORRHOEA DNA SPEC QL NAA+PROBE: NOT DETECTED
RPR SER QL: NORMAL
RUBV IGG SER-ACNC: 53.8 IU/ML
RUBV IGG SER-IMP: REACTIVE

## 2020-03-11 LAB — BACTERIA UR CULT: ABNORMAL

## 2020-03-12 ENCOUNTER — PATIENT MESSAGE (OUTPATIENT)
Dept: OBSTETRICS AND GYNECOLOGY | Facility: CLINIC | Age: 35
End: 2020-03-12

## 2020-03-12 RX ORDER — CEPHALEXIN 500 MG/1
500 CAPSULE ORAL 4 TIMES DAILY
Qty: 40 CAPSULE | Refills: 0 | Status: SHIPPED | OUTPATIENT
Start: 2020-03-12 | End: 2020-03-22

## 2020-03-18 ENCOUNTER — PATIENT MESSAGE (OUTPATIENT)
Dept: OBSTETRICS AND GYNECOLOGY | Facility: CLINIC | Age: 35
End: 2020-03-18

## 2020-03-21 ENCOUNTER — PATIENT MESSAGE (OUTPATIENT)
Dept: ADMINISTRATIVE | Facility: OTHER | Age: 35
End: 2020-03-21

## 2020-04-08 ENCOUNTER — ROUTINE PRENATAL (OUTPATIENT)
Dept: OBSTETRICS AND GYNECOLOGY | Facility: CLINIC | Age: 35
End: 2020-04-08
Payer: COMMERCIAL

## 2020-04-08 VITALS
BODY MASS INDEX: 22.31 KG/M2 | DIASTOLIC BLOOD PRESSURE: 64 MMHG | WEIGHT: 129.94 LBS | SYSTOLIC BLOOD PRESSURE: 100 MMHG

## 2020-04-08 DIAGNOSIS — Z3A.13 13 WEEKS GESTATION OF PREGNANCY: ICD-10-CM

## 2020-04-08 DIAGNOSIS — O26.892 RH NEGATIVE STATE IN ANTEPARTUM PERIOD, SECOND TRIMESTER: Primary | ICD-10-CM

## 2020-04-08 DIAGNOSIS — Z67.91 RH NEGATIVE STATE IN ANTEPARTUM PERIOD, SECOND TRIMESTER: Primary | ICD-10-CM

## 2020-04-08 PROCEDURE — 99999 PR PBB SHADOW E&M-EST. PATIENT-LVL II: CPT | Mod: PBBFAC,,, | Performed by: OBSTETRICS & GYNECOLOGY

## 2020-04-08 PROCEDURE — 0502F SUBSEQUENT PRENATAL CARE: CPT | Mod: CPTII,S$GLB,, | Performed by: OBSTETRICS & GYNECOLOGY

## 2020-04-08 PROCEDURE — 0502F PR SUBSEQUENT PRENATAL CARE: ICD-10-PCS | Mod: CPTII,S$GLB,, | Performed by: OBSTETRICS & GYNECOLOGY

## 2020-04-08 PROCEDURE — 99999 PR PBB SHADOW E&M-EST. PATIENT-LVL II: ICD-10-PCS | Mod: PBBFAC,,, | Performed by: OBSTETRICS & GYNECOLOGY

## 2020-04-08 NOTE — PROGRESS NOTES
13w1d without major complaints.   Enrolled in [employer].   4 weeks for virtual visit.   Quad screen at time of Anatomy U/S. Pt aware she must call and tell us her weight on that date in order for lab to be done.

## 2020-05-05 ENCOUNTER — OFFICE VISIT (OUTPATIENT)
Dept: OBSTETRICS AND GYNECOLOGY | Facility: CLINIC | Age: 35
End: 2020-05-05
Attending: STUDENT IN AN ORGANIZED HEALTH CARE EDUCATION/TRAINING PROGRAM
Payer: COMMERCIAL

## 2020-05-05 DIAGNOSIS — Z3A.17 17 WEEKS GESTATION OF PREGNANCY: Primary | ICD-10-CM

## 2020-05-05 PROCEDURE — 0502F SUBSEQUENT PRENATAL CARE: CPT | Mod: CPTII,,, | Performed by: STUDENT IN AN ORGANIZED HEALTH CARE EDUCATION/TRAINING PROGRAM

## 2020-05-05 PROCEDURE — 0502F PR SUBSEQUENT PRENATAL CARE: ICD-10-PCS | Mod: CPTII,,, | Performed by: STUDENT IN AN ORGANIZED HEALTH CARE EDUCATION/TRAINING PROGRAM

## 2020-05-05 NOTE — PROGRESS NOTES
The patient location is: Home  The chief complaint leading to consultation is: Pregnancy  Visit type: audiovisual  Total time spent with patient: 6 minutes  Each patient to whom he or she provides medical services by telemedicine is:  (1) informed of the relationship between the physician and patient and the respective role of any other health care provider with respect to management of the patient; and (2) notified that he or she may decline to receive medical services by telemedicine and may withdraw from such care at any time.    Notes: Doing well.  No complaints.  Will get Quad screen done with anatomy U/S.  Also needs repeat urine culture.  She will call us on day she goes for Quad screen to give us weight.  No cramping or bleeding.  All questions answered.  RTC in 4 weeks.

## 2020-05-25 ENCOUNTER — PROCEDURE VISIT (OUTPATIENT)
Dept: MATERNAL FETAL MEDICINE | Facility: CLINIC | Age: 35
End: 2020-05-25
Payer: COMMERCIAL

## 2020-05-25 ENCOUNTER — LAB VISIT (OUTPATIENT)
Dept: LAB | Facility: OTHER | Age: 35
End: 2020-05-25
Attending: OBSTETRICS & GYNECOLOGY
Payer: COMMERCIAL

## 2020-05-25 ENCOUNTER — TELEPHONE (OUTPATIENT)
Dept: OBSTETRICS AND GYNECOLOGY | Facility: CLINIC | Age: 35
End: 2020-05-25

## 2020-05-25 DIAGNOSIS — Z67.91 RH NEGATIVE STATE IN ANTEPARTUM PERIOD, SECOND TRIMESTER: ICD-10-CM

## 2020-05-25 DIAGNOSIS — Z3A.19 19 WEEKS GESTATION OF PREGNANCY: ICD-10-CM

## 2020-05-25 DIAGNOSIS — O26.892 RH NEGATIVE STATE IN ANTEPARTUM PERIOD, SECOND TRIMESTER: ICD-10-CM

## 2020-05-25 DIAGNOSIS — Z36.3 ANTENATAL SCREENING FOR MALFORMATION USING ULTRASONICS: ICD-10-CM

## 2020-05-25 DIAGNOSIS — Z36.4 ANTENATAL SCREENING FOR FETAL GROWTH RETARDATION USING ULTRASONICS: ICD-10-CM

## 2020-05-25 DIAGNOSIS — Z3A.19 19 WEEKS GESTATION OF PREGNANCY: Primary | ICD-10-CM

## 2020-05-25 PROCEDURE — 76805 OB US >/= 14 WKS SNGL FETUS: CPT | Mod: S$GLB,,, | Performed by: OBSTETRICS & GYNECOLOGY

## 2020-05-25 PROCEDURE — 36415 COLL VENOUS BLD VENIPUNCTURE: CPT

## 2020-05-25 PROCEDURE — 81511 FTL CGEN ABNOR FOUR ANAL: CPT

## 2020-05-25 PROCEDURE — 76805 PR US, OB 14+WKS, TRANSABD, SINGLE GESTATION: ICD-10-PCS | Mod: S$GLB,,, | Performed by: OBSTETRICS & GYNECOLOGY

## 2020-05-28 LAB
# FETUSES US: NORMAL
2ND TRIMESTER 4 SCREEN PNL SERPL: NEGATIVE
2ND TRIMESTER 4 SCREEN SERPL-IMP: NORMAL
AFP MOM SERPL: 0.72
AFP SERPL-MCNC: 42.2 NG/ML
AGE AT DELIVERY: 34
B-HCG MOM SERPL: 0.54
B-HCG SERPL-ACNC: 13.6 IU/ML
FET TS 21 RISK FROM MAT AGE: NORMAL
GA (DAYS): 3 D
GA (WEEKS): 19 WK
GA METHOD: NORMAL
IDDM PATIENT QL: NORMAL
INHIBIN A MOM SERPL: 1.17
INHIBIN A SERPL-MCNC: 196.9 PG/ML
SMOKING STATUS FTND: NO
TS 18 RISK FETUS: NORMAL
TS 21 RISK FETUS: NORMAL
U ESTRIOL MOM SERPL: 0.83
U ESTRIOL SERPL-MCNC: 1.67 NG/ML

## 2020-06-24 ENCOUNTER — TELEPHONE (OUTPATIENT)
Dept: OBSTETRICS AND GYNECOLOGY | Facility: CLINIC | Age: 35
End: 2020-06-24

## 2020-06-24 ENCOUNTER — ROUTINE PRENATAL (OUTPATIENT)
Dept: OBSTETRICS AND GYNECOLOGY | Facility: CLINIC | Age: 35
End: 2020-06-24
Payer: COMMERCIAL

## 2020-06-24 VITALS
SYSTOLIC BLOOD PRESSURE: 100 MMHG | BODY MASS INDEX: 24.05 KG/M2 | WEIGHT: 140.13 LBS | DIASTOLIC BLOOD PRESSURE: 60 MMHG

## 2020-06-24 DIAGNOSIS — Z3A.23 23 WEEKS GESTATION OF PREGNANCY: ICD-10-CM

## 2020-06-24 DIAGNOSIS — Z67.91 RH NEGATIVE STATE IN ANTEPARTUM PERIOD, SECOND TRIMESTER: Primary | ICD-10-CM

## 2020-06-24 DIAGNOSIS — O26.892 RH NEGATIVE STATE IN ANTEPARTUM PERIOD, SECOND TRIMESTER: Primary | ICD-10-CM

## 2020-06-24 PROCEDURE — 99999 PR PBB SHADOW E&M-EST. PATIENT-LVL II: CPT | Mod: PBBFAC,,, | Performed by: OBSTETRICS & GYNECOLOGY

## 2020-06-24 PROCEDURE — 99999 PR PBB SHADOW E&M-EST. PATIENT-LVL II: ICD-10-PCS | Mod: PBBFAC,,, | Performed by: OBSTETRICS & GYNECOLOGY

## 2020-06-24 PROCEDURE — 0502F SUBSEQUENT PRENATAL CARE: CPT | Mod: CPTII,S$GLB,, | Performed by: OBSTETRICS & GYNECOLOGY

## 2020-06-24 PROCEDURE — 0502F PR SUBSEQUENT PRENATAL CARE: ICD-10-PCS | Mod: CPTII,S$GLB,, | Performed by: OBSTETRICS & GYNECOLOGY

## 2020-06-24 NOTE — PROGRESS NOTES
23w5d without major complaints.   1 hour GCT and CBC next visit.   RTC in 4 weeks for routine PNC.

## 2020-07-29 ENCOUNTER — LAB VISIT (OUTPATIENT)
Dept: LAB | Facility: HOSPITAL | Age: 35
End: 2020-07-29
Attending: OBSTETRICS & GYNECOLOGY
Payer: COMMERCIAL

## 2020-07-29 ENCOUNTER — ROUTINE PRENATAL (OUTPATIENT)
Dept: OBSTETRICS AND GYNECOLOGY | Facility: CLINIC | Age: 35
End: 2020-07-29
Payer: COMMERCIAL

## 2020-07-29 ENCOUNTER — CLINICAL SUPPORT (OUTPATIENT)
Dept: OBSTETRICS AND GYNECOLOGY | Facility: CLINIC | Age: 35
End: 2020-07-29
Payer: COMMERCIAL

## 2020-07-29 VITALS — SYSTOLIC BLOOD PRESSURE: 98 MMHG | DIASTOLIC BLOOD PRESSURE: 60 MMHG | WEIGHT: 148.38 LBS | BODY MASS INDEX: 25.47 KG/M2

## 2020-07-29 DIAGNOSIS — Z67.91 RH NEGATIVE STATE IN ANTEPARTUM PERIOD, THIRD TRIMESTER: Primary | ICD-10-CM

## 2020-07-29 DIAGNOSIS — O26.893 RH NEGATIVE STATE IN ANTEPARTUM PERIOD, THIRD TRIMESTER: Primary | ICD-10-CM

## 2020-07-29 DIAGNOSIS — Z3A.23 23 WEEKS GESTATION OF PREGNANCY: ICD-10-CM

## 2020-07-29 DIAGNOSIS — R82.90 ABNORMAL RESULT ON SCREENING URINE TEST: ICD-10-CM

## 2020-07-29 DIAGNOSIS — Z23 NEED FOR TDAP VACCINATION: ICD-10-CM

## 2020-07-29 DIAGNOSIS — Z3A.28 28 WEEKS GESTATION OF PREGNANCY: ICD-10-CM

## 2020-07-29 LAB
BASOPHILS # BLD AUTO: 0.04 K/UL (ref 0–0.2)
BASOPHILS NFR BLD: 0.4 % (ref 0–1.9)
DIFFERENTIAL METHOD: ABNORMAL
EOSINOPHIL # BLD AUTO: 0 K/UL (ref 0–0.5)
EOSINOPHIL NFR BLD: 0.4 % (ref 0–8)
ERYTHROCYTE [DISTWIDTH] IN BLOOD BY AUTOMATED COUNT: 13.1 % (ref 11.5–14.5)
GLUCOSE SERPL-MCNC: 82 MG/DL (ref 70–140)
HCT VFR BLD AUTO: 32.3 % (ref 37–48.5)
HGB BLD-MCNC: 9.9 G/DL (ref 12–16)
IMM GRANULOCYTES # BLD AUTO: 0.03 K/UL (ref 0–0.04)
IMM GRANULOCYTES NFR BLD AUTO: 0.3 % (ref 0–0.5)
LYMPHOCYTES # BLD AUTO: 2 K/UL (ref 1–4.8)
LYMPHOCYTES NFR BLD: 20 % (ref 18–48)
MCH RBC QN AUTO: 26.3 PG (ref 27–31)
MCHC RBC AUTO-ENTMCNC: 30.7 G/DL (ref 32–36)
MCV RBC AUTO: 86 FL (ref 82–98)
MONOCYTES # BLD AUTO: 0.5 K/UL (ref 0.3–1)
MONOCYTES NFR BLD: 5.5 % (ref 4–15)
NEUTROPHILS # BLD AUTO: 7.3 K/UL (ref 1.8–7.7)
NEUTROPHILS NFR BLD: 73.4 % (ref 38–73)
NRBC BLD-RTO: 0 /100 WBC
PLATELET # BLD AUTO: 217 K/UL (ref 150–350)
PMV BLD AUTO: 12.2 FL (ref 9.2–12.9)
RBC # BLD AUTO: 3.76 M/UL (ref 4–5.4)
WBC # BLD AUTO: 9.88 K/UL (ref 3.9–12.7)

## 2020-07-29 PROCEDURE — 85025 COMPLETE CBC W/AUTO DIFF WBC: CPT

## 2020-07-29 PROCEDURE — 0502F PR SUBSEQUENT PRENATAL CARE: ICD-10-PCS | Mod: ,,, | Performed by: OBSTETRICS & GYNECOLOGY

## 2020-07-29 PROCEDURE — 96372 THER/PROPH/DIAG INJ SC/IM: CPT | Mod: PBBFAC,59,PN

## 2020-07-29 PROCEDURE — 99999 PR PBB SHADOW E&M-EST. PATIENT-LVL II: ICD-10-PCS | Mod: PBBFAC,,, | Performed by: OBSTETRICS & GYNECOLOGY

## 2020-07-29 PROCEDURE — 87186 SC STD MICRODIL/AGAR DIL: CPT

## 2020-07-29 PROCEDURE — 99999 PR PBB SHADOW E&M-EST. PATIENT-LVL II: CPT | Mod: PBBFAC,,, | Performed by: OBSTETRICS & GYNECOLOGY

## 2020-07-29 PROCEDURE — 87088 URINE BACTERIA CULTURE: CPT

## 2020-07-29 PROCEDURE — 82950 GLUCOSE TEST: CPT

## 2020-07-29 PROCEDURE — 0502F SUBSEQUENT PRENATAL CARE: CPT | Mod: ,,, | Performed by: OBSTETRICS & GYNECOLOGY

## 2020-07-29 PROCEDURE — 90471 IMMUNIZATION ADMIN: CPT | Mod: PBBFAC,PN

## 2020-07-29 PROCEDURE — 87077 CULTURE AEROBIC IDENTIFY: CPT

## 2020-07-29 PROCEDURE — 87086 URINE CULTURE/COLONY COUNT: CPT

## 2020-07-29 PROCEDURE — 99999 PR PBB SHADOW E&M-EST. PATIENT-LVL I: CPT | Mod: PBBFAC,,,

## 2020-07-29 PROCEDURE — 99999 PR PBB SHADOW E&M-EST. PATIENT-LVL I: ICD-10-PCS | Mod: PBBFAC,,,

## 2020-07-29 NOTE — PROGRESS NOTES
Ordering Provider: Dr. Martinez    During visit today patient received an injection of tdap to RIGHT deltoid and rhogam to RIGHT glut.  Tolerated well.  Instructed pt to remain 15 minutes after injection to monitor for reactions.      Pre pain scale: none    Post pain scale: none

## 2020-07-29 NOTE — PROGRESS NOTES
28w5d without major complaints.   Tdap given today.   Rhogam given today.   RTC in 4 weeks for routine PNC.

## 2020-07-31 LAB — BACTERIA UR CULT: ABNORMAL

## 2020-08-03 ENCOUNTER — PATIENT MESSAGE (OUTPATIENT)
Dept: OBSTETRICS AND GYNECOLOGY | Facility: CLINIC | Age: 35
End: 2020-08-03

## 2020-08-03 RX ORDER — NITROFURANTOIN 25; 75 MG/1; MG/1
100 CAPSULE ORAL 2 TIMES DAILY
Qty: 14 CAPSULE | Refills: 0 | Status: SHIPPED | OUTPATIENT
Start: 2020-08-03 | End: 2020-08-10

## 2020-08-25 ENCOUNTER — ROUTINE PRENATAL (OUTPATIENT)
Dept: OBSTETRICS AND GYNECOLOGY | Facility: CLINIC | Age: 35
End: 2020-08-25
Payer: COMMERCIAL

## 2020-08-25 VITALS
DIASTOLIC BLOOD PRESSURE: 70 MMHG | SYSTOLIC BLOOD PRESSURE: 110 MMHG | WEIGHT: 149.94 LBS | BODY MASS INDEX: 25.73 KG/M2

## 2020-08-25 DIAGNOSIS — Z3A.32 32 WEEKS GESTATION OF PREGNANCY: ICD-10-CM

## 2020-08-25 DIAGNOSIS — O26.893 RH NEGATIVE STATE IN ANTEPARTUM PERIOD, THIRD TRIMESTER: Primary | ICD-10-CM

## 2020-08-25 DIAGNOSIS — Z67.91 RH NEGATIVE STATE IN ANTEPARTUM PERIOD, THIRD TRIMESTER: Primary | ICD-10-CM

## 2020-08-25 DIAGNOSIS — O23.40 UTI (URINARY TRACT INFECTION) IN PREGNANCY, ANTEPARTUM: ICD-10-CM

## 2020-08-25 PROCEDURE — 87088 URINE BACTERIA CULTURE: CPT

## 2020-08-25 PROCEDURE — 87186 SC STD MICRODIL/AGAR DIL: CPT

## 2020-08-25 PROCEDURE — 0502F SUBSEQUENT PRENATAL CARE: CPT | Mod: CPTII,S$GLB,, | Performed by: OBSTETRICS & GYNECOLOGY

## 2020-08-25 PROCEDURE — 87077 CULTURE AEROBIC IDENTIFY: CPT

## 2020-08-25 PROCEDURE — 99999 PR PBB SHADOW E&M-EST. PATIENT-LVL II: ICD-10-PCS | Mod: PBBFAC,,, | Performed by: OBSTETRICS & GYNECOLOGY

## 2020-08-25 PROCEDURE — 87086 URINE CULTURE/COLONY COUNT: CPT

## 2020-08-25 PROCEDURE — 0502F PR SUBSEQUENT PRENATAL CARE: ICD-10-PCS | Mod: CPTII,S$GLB,, | Performed by: OBSTETRICS & GYNECOLOGY

## 2020-08-25 PROCEDURE — 99999 PR PBB SHADOW E&M-EST. PATIENT-LVL II: CPT | Mod: PBBFAC,,, | Performed by: OBSTETRICS & GYNECOLOGY

## 2020-08-25 NOTE — PROGRESS NOTES
32w4d without major complaints.  PPC discussed and patient would like condoms.   RTC in 2 weeks for routine PNC.

## 2020-08-27 LAB — BACTERIA UR CULT: ABNORMAL

## 2020-08-28 ENCOUNTER — PATIENT MESSAGE (OUTPATIENT)
Dept: OBSTETRICS AND GYNECOLOGY | Facility: CLINIC | Age: 35
End: 2020-08-28

## 2020-08-28 RX ORDER — CIPROFLOXACIN 500 MG/1
500 TABLET ORAL 2 TIMES DAILY
Qty: 20 TABLET | Refills: 0 | Status: SHIPPED | OUTPATIENT
Start: 2020-08-28 | End: 2020-09-08

## 2020-09-08 ENCOUNTER — ROUTINE PRENATAL (OUTPATIENT)
Dept: OBSTETRICS AND GYNECOLOGY | Facility: CLINIC | Age: 35
End: 2020-09-08
Payer: COMMERCIAL

## 2020-09-08 VITALS
BODY MASS INDEX: 26.62 KG/M2 | DIASTOLIC BLOOD PRESSURE: 78 MMHG | SYSTOLIC BLOOD PRESSURE: 109 MMHG | WEIGHT: 155.13 LBS

## 2020-09-08 DIAGNOSIS — Z67.91 RH NEGATIVE STATE IN ANTEPARTUM PERIOD, THIRD TRIMESTER: ICD-10-CM

## 2020-09-08 DIAGNOSIS — O23.40 UTI (URINARY TRACT INFECTION) IN PREGNANCY, ANTEPARTUM: Primary | ICD-10-CM

## 2020-09-08 DIAGNOSIS — Z3A.34 34 WEEKS GESTATION OF PREGNANCY: ICD-10-CM

## 2020-09-08 DIAGNOSIS — O26.893 RH NEGATIVE STATE IN ANTEPARTUM PERIOD, THIRD TRIMESTER: ICD-10-CM

## 2020-09-08 PROCEDURE — 90686 FLU VACCINE (QUAD) GREATER THAN OR EQUAL TO 3YO PRESERVATIVE FREE IM: ICD-10-PCS | Mod: S$GLB,,, | Performed by: OBSTETRICS & GYNECOLOGY

## 2020-09-08 PROCEDURE — 0502F SUBSEQUENT PRENATAL CARE: CPT | Mod: CPTII,S$GLB,, | Performed by: OBSTETRICS & GYNECOLOGY

## 2020-09-08 PROCEDURE — 90686 IIV4 VACC NO PRSV 0.5 ML IM: CPT | Mod: S$GLB,,, | Performed by: OBSTETRICS & GYNECOLOGY

## 2020-09-08 PROCEDURE — 90471 FLU VACCINE (QUAD) GREATER THAN OR EQUAL TO 3YO PRESERVATIVE FREE IM: ICD-10-PCS | Mod: S$GLB,,, | Performed by: OBSTETRICS & GYNECOLOGY

## 2020-09-08 PROCEDURE — 87086 URINE CULTURE/COLONY COUNT: CPT

## 2020-09-08 PROCEDURE — 90471 IMMUNIZATION ADMIN: CPT | Mod: S$GLB,,, | Performed by: OBSTETRICS & GYNECOLOGY

## 2020-09-08 PROCEDURE — 99999 PR PBB SHADOW E&M-EST. PATIENT-LVL II: ICD-10-PCS | Mod: PBBFAC,,, | Performed by: OBSTETRICS & GYNECOLOGY

## 2020-09-08 PROCEDURE — 99999 PR PBB SHADOW E&M-EST. PATIENT-LVL II: CPT | Mod: PBBFAC,,, | Performed by: OBSTETRICS & GYNECOLOGY

## 2020-09-08 PROCEDURE — 0502F PR SUBSEQUENT PRENATAL CARE: ICD-10-PCS | Mod: CPTII,S$GLB,, | Performed by: OBSTETRICS & GYNECOLOGY

## 2020-09-10 LAB — BACTERIA UR CULT: NO GROWTH

## 2020-09-22 ENCOUNTER — ROUTINE PRENATAL (OUTPATIENT)
Dept: OBSTETRICS AND GYNECOLOGY | Facility: CLINIC | Age: 35
End: 2020-09-22
Payer: COMMERCIAL

## 2020-09-22 ENCOUNTER — LAB VISIT (OUTPATIENT)
Dept: LAB | Facility: OTHER | Age: 35
End: 2020-09-22
Attending: OBSTETRICS & GYNECOLOGY
Payer: COMMERCIAL

## 2020-09-22 VITALS
SYSTOLIC BLOOD PRESSURE: 100 MMHG | BODY MASS INDEX: 26.68 KG/M2 | WEIGHT: 155.44 LBS | DIASTOLIC BLOOD PRESSURE: 60 MMHG

## 2020-09-22 DIAGNOSIS — O26.893 RH NEGATIVE STATE IN ANTEPARTUM PERIOD, THIRD TRIMESTER: Primary | ICD-10-CM

## 2020-09-22 DIAGNOSIS — Z34.90 PREGNANCY, UNSPECIFIED GESTATIONAL AGE: ICD-10-CM

## 2020-09-22 DIAGNOSIS — Z67.91 RH NEGATIVE STATE IN ANTEPARTUM PERIOD, THIRD TRIMESTER: Primary | ICD-10-CM

## 2020-09-22 DIAGNOSIS — Z3A.36 36 WEEKS GESTATION OF PREGNANCY: ICD-10-CM

## 2020-09-22 LAB — RPR SER QL: NORMAL

## 2020-09-22 PROCEDURE — 86703 HIV-1/HIV-2 1 RESULT ANTBDY: CPT

## 2020-09-22 PROCEDURE — 99999 PR PBB SHADOW E&M-EST. PATIENT-LVL III: CPT | Mod: PBBFAC,,, | Performed by: OBSTETRICS & GYNECOLOGY

## 2020-09-22 PROCEDURE — 0502F SUBSEQUENT PRENATAL CARE: CPT | Mod: S$GLB,,, | Performed by: OBSTETRICS & GYNECOLOGY

## 2020-09-22 PROCEDURE — 87081 CULTURE SCREEN ONLY: CPT

## 2020-09-22 PROCEDURE — 99999 PR PBB SHADOW E&M-EST. PATIENT-LVL III: ICD-10-PCS | Mod: PBBFAC,,, | Performed by: OBSTETRICS & GYNECOLOGY

## 2020-09-22 PROCEDURE — 36415 COLL VENOUS BLD VENIPUNCTURE: CPT

## 2020-09-22 PROCEDURE — 0502F PR SUBSEQUENT PRENATAL CARE: ICD-10-PCS | Mod: S$GLB,,, | Performed by: OBSTETRICS & GYNECOLOGY

## 2020-09-22 PROCEDURE — 86592 SYPHILIS TEST NON-TREP QUAL: CPT

## 2020-09-22 NOTE — PROGRESS NOTES
36w4d without major complaints.   R/B/A of vaginal, operative, and  delivery as well as transfusion of blood products discussed today. All questions answered and patient voices understanding. Consents signed.   GBS, HIV, RPR, CBC collected.   Labor precautions discussed.   RTC in 1 week for routine PNC.

## 2020-09-23 LAB — HIV 1+2 AB+HIV1 P24 AG SERPL QL IA: NEGATIVE

## 2020-09-24 ENCOUNTER — PATIENT MESSAGE (OUTPATIENT)
Dept: OBSTETRICS AND GYNECOLOGY | Facility: CLINIC | Age: 35
End: 2020-09-24

## 2020-09-24 DIAGNOSIS — R82.90 CLOUDY URINE: Primary | ICD-10-CM

## 2020-09-25 ENCOUNTER — LAB VISIT (OUTPATIENT)
Dept: LAB | Facility: OTHER | Age: 35
End: 2020-09-25
Attending: OBSTETRICS & GYNECOLOGY
Payer: COMMERCIAL

## 2020-09-25 ENCOUNTER — TELEPHONE (OUTPATIENT)
Dept: OBSTETRICS AND GYNECOLOGY | Facility: CLINIC | Age: 35
End: 2020-09-25

## 2020-09-25 DIAGNOSIS — R82.90 CLOUDY URINE: ICD-10-CM

## 2020-09-25 LAB
BACTERIA SPEC AEROBE CULT: NORMAL
BILIRUB UR QL STRIP: NEGATIVE
CLARITY UR: CLEAR
COLOR UR: YELLOW
GLUCOSE UR QL STRIP: NEGATIVE
HGB UR QL STRIP: NEGATIVE
KETONES UR QL STRIP: NEGATIVE
LEUKOCYTE ESTERASE UR QL STRIP: ABNORMAL
MICROSCOPIC COMMENT: NORMAL
NITRITE UR QL STRIP: NEGATIVE
PH UR STRIP: 7 [PH] (ref 5–8)
PROT UR QL STRIP: NEGATIVE
SP GR UR STRIP: <=1.005 (ref 1–1.03)
SQUAMOUS #/AREA URNS HPF: 5 /HPF
URN SPEC COLLECT METH UR: ABNORMAL
UROBILINOGEN UR STRIP-ACNC: NEGATIVE EU/DL
WBC #/AREA URNS HPF: 3 /HPF (ref 0–5)

## 2020-09-25 PROCEDURE — 81000 URINALYSIS NONAUTO W/SCOPE: CPT

## 2020-09-25 PROCEDURE — 87086 URINE CULTURE/COLONY COUNT: CPT

## 2020-09-25 RX ORDER — NITROFURANTOIN 25; 75 MG/1; MG/1
100 CAPSULE ORAL 2 TIMES DAILY
Qty: 14 CAPSULE | Refills: 0 | Status: SHIPPED | OUTPATIENT
Start: 2020-09-25 | End: 2020-10-02

## 2020-09-25 NOTE — TELEPHONE ENCOUNTER
Spoke with pt. C/o throbbing pain inside vagina, urinary urgency that feels different than normal, and cloudy urine.  She thinks she has a UTI.  Denies back pain and fever.   States there pay be a mild odor when voiding.  She has had a recurring UTI throughout the pregnancy but they had been asymptomatic.      She is dropping off a urine sample today.     Macrobid pended

## 2020-09-27 LAB — BACTERIA UR CULT: NORMAL

## 2020-09-29 ENCOUNTER — ROUTINE PRENATAL (OUTPATIENT)
Dept: OBSTETRICS AND GYNECOLOGY | Facility: CLINIC | Age: 35
End: 2020-09-29
Payer: COMMERCIAL

## 2020-09-29 VITALS
DIASTOLIC BLOOD PRESSURE: 70 MMHG | SYSTOLIC BLOOD PRESSURE: 100 MMHG | BODY MASS INDEX: 27.34 KG/M2 | WEIGHT: 159.31 LBS

## 2020-09-29 DIAGNOSIS — Z3A.37 37 WEEKS GESTATION OF PREGNANCY: ICD-10-CM

## 2020-09-29 DIAGNOSIS — Z67.91 RH NEGATIVE STATE IN ANTEPARTUM PERIOD, THIRD TRIMESTER: Primary | ICD-10-CM

## 2020-09-29 DIAGNOSIS — O26.893 RH NEGATIVE STATE IN ANTEPARTUM PERIOD, THIRD TRIMESTER: Primary | ICD-10-CM

## 2020-09-29 PROCEDURE — 0502F SUBSEQUENT PRENATAL CARE: CPT | Mod: CPTII,S$GLB,, | Performed by: OBSTETRICS & GYNECOLOGY

## 2020-09-29 PROCEDURE — 99999 PR PBB SHADOW E&M-EST. PATIENT-LVL II: ICD-10-PCS | Mod: PBBFAC,,, | Performed by: OBSTETRICS & GYNECOLOGY

## 2020-09-29 PROCEDURE — 0502F PR SUBSEQUENT PRENATAL CARE: ICD-10-PCS | Mod: CPTII,S$GLB,, | Performed by: OBSTETRICS & GYNECOLOGY

## 2020-09-29 PROCEDURE — 99999 PR PBB SHADOW E&M-EST. PATIENT-LVL II: CPT | Mod: PBBFAC,,, | Performed by: OBSTETRICS & GYNECOLOGY

## 2020-09-29 NOTE — PROGRESS NOTES
37w4d without major complaints.   Labor precautions reviewed.   Cvx: 1/0/-3, posterior, firm.  RTC in 1 week for routine PNC.

## 2020-10-06 ENCOUNTER — ROUTINE PRENATAL (OUTPATIENT)
Dept: OBSTETRICS AND GYNECOLOGY | Facility: CLINIC | Age: 35
End: 2020-10-06
Payer: COMMERCIAL

## 2020-10-06 VITALS
SYSTOLIC BLOOD PRESSURE: 100 MMHG | WEIGHT: 161.19 LBS | BODY MASS INDEX: 27.66 KG/M2 | DIASTOLIC BLOOD PRESSURE: 64 MMHG

## 2020-10-06 DIAGNOSIS — Z67.91 RH NEGATIVE STATE IN ANTEPARTUM PERIOD, THIRD TRIMESTER: Primary | ICD-10-CM

## 2020-10-06 DIAGNOSIS — Z3A.38 38 WEEKS GESTATION OF PREGNANCY: ICD-10-CM

## 2020-10-06 DIAGNOSIS — O26.893 RH NEGATIVE STATE IN ANTEPARTUM PERIOD, THIRD TRIMESTER: Primary | ICD-10-CM

## 2020-10-06 PROCEDURE — 0502F SUBSEQUENT PRENATAL CARE: CPT | Mod: CPTII,S$GLB,, | Performed by: OBSTETRICS & GYNECOLOGY

## 2020-10-06 PROCEDURE — 0502F PR SUBSEQUENT PRENATAL CARE: ICD-10-PCS | Mod: CPTII,S$GLB,, | Performed by: OBSTETRICS & GYNECOLOGY

## 2020-10-06 PROCEDURE — 99999 PR PBB SHADOW E&M-EST. PATIENT-LVL II: ICD-10-PCS | Mod: PBBFAC,,, | Performed by: OBSTETRICS & GYNECOLOGY

## 2020-10-06 PROCEDURE — 99999 PR PBB SHADOW E&M-EST. PATIENT-LVL II: CPT | Mod: PBBFAC,,, | Performed by: OBSTETRICS & GYNECOLOGY

## 2020-10-12 ENCOUNTER — TELEPHONE (OUTPATIENT)
Dept: OBSTETRICS AND GYNECOLOGY | Facility: CLINIC | Age: 35
End: 2020-10-12

## 2020-10-12 DIAGNOSIS — O48.0 POST-TERM PREGNANCY, 40-42 WEEKS OF GESTATION: Primary | ICD-10-CM

## 2020-10-12 NOTE — TELEPHONE ENCOUNTER
----- Message from Lyssa Martinez MD sent at 10/9/2020  5:42 PM CDT -----  Can we place a request for a 5 pm induction on 10/22 to deliver on 10/23?     Nallely will be 41w0d.   . Reason is post-dates.   Thank you!

## 2020-10-12 NOTE — PROGRESS NOTES
39w4d without major complaints.   Labor precautions reviewed.   Membranes swept.  IOL scheduled for 10/22 at 5 pm.   RTC in 1 week for routine PNC.

## 2020-10-13 ENCOUNTER — ROUTINE PRENATAL (OUTPATIENT)
Dept: OBSTETRICS AND GYNECOLOGY | Facility: CLINIC | Age: 35
End: 2020-10-13
Payer: COMMERCIAL

## 2020-10-13 VITALS
DIASTOLIC BLOOD PRESSURE: 62 MMHG | WEIGHT: 160.25 LBS | SYSTOLIC BLOOD PRESSURE: 100 MMHG | BODY MASS INDEX: 27.51 KG/M2

## 2020-10-13 DIAGNOSIS — O26.893 RH NEGATIVE STATE IN ANTEPARTUM PERIOD, THIRD TRIMESTER: Primary | ICD-10-CM

## 2020-10-13 DIAGNOSIS — Z3A.39 39 WEEKS GESTATION OF PREGNANCY: ICD-10-CM

## 2020-10-13 DIAGNOSIS — Z67.91 RH NEGATIVE STATE IN ANTEPARTUM PERIOD, THIRD TRIMESTER: Primary | ICD-10-CM

## 2020-10-13 PROCEDURE — 99999 PR PBB SHADOW E&M-EST. PATIENT-LVL II: ICD-10-PCS | Mod: PBBFAC,,, | Performed by: OBSTETRICS & GYNECOLOGY

## 2020-10-13 PROCEDURE — 0502F SUBSEQUENT PRENATAL CARE: CPT | Mod: CPTII,S$GLB,, | Performed by: OBSTETRICS & GYNECOLOGY

## 2020-10-13 PROCEDURE — 0502F PR SUBSEQUENT PRENATAL CARE: ICD-10-PCS | Mod: CPTII,S$GLB,, | Performed by: OBSTETRICS & GYNECOLOGY

## 2020-10-13 PROCEDURE — 99999 PR PBB SHADOW E&M-EST. PATIENT-LVL II: CPT | Mod: PBBFAC,,, | Performed by: OBSTETRICS & GYNECOLOGY

## 2020-10-20 ENCOUNTER — CLINICAL SUPPORT (OUTPATIENT)
Dept: URGENT CARE | Facility: CLINIC | Age: 35
End: 2020-10-20
Payer: COMMERCIAL

## 2020-10-20 ENCOUNTER — ROUTINE PRENATAL (OUTPATIENT)
Dept: OBSTETRICS AND GYNECOLOGY | Facility: CLINIC | Age: 35
End: 2020-10-20
Payer: COMMERCIAL

## 2020-10-20 VITALS
WEIGHT: 162.13 LBS | SYSTOLIC BLOOD PRESSURE: 102 MMHG | DIASTOLIC BLOOD PRESSURE: 72 MMHG | BODY MASS INDEX: 27.83 KG/M2

## 2020-10-20 VITALS — OXYGEN SATURATION: 97 % | TEMPERATURE: 98 F | HEART RATE: 84 BPM

## 2020-10-20 DIAGNOSIS — Z11.52 ENCOUNTER FOR SCREENING LABORATORY TESTING FOR COVID-19 VIRUS: Primary | ICD-10-CM

## 2020-10-20 DIAGNOSIS — Z67.91 RH NEGATIVE STATE IN ANTEPARTUM PERIOD, THIRD TRIMESTER: ICD-10-CM

## 2020-10-20 DIAGNOSIS — O48.0 POST-TERM PREGNANCY, 40-42 WEEKS OF GESTATION: ICD-10-CM

## 2020-10-20 DIAGNOSIS — O26.893 RH NEGATIVE STATE IN ANTEPARTUM PERIOD, THIRD TRIMESTER: ICD-10-CM

## 2020-10-20 DIAGNOSIS — Z3A.40 40 WEEKS GESTATION OF PREGNANCY: ICD-10-CM

## 2020-10-20 PROCEDURE — 99999 PR PBB SHADOW E&M-EST. PATIENT-LVL III: ICD-10-PCS | Mod: PBBFAC,,, | Performed by: OBSTETRICS & GYNECOLOGY

## 2020-10-20 PROCEDURE — 0502F PR SUBSEQUENT PRENATAL CARE: ICD-10-PCS | Mod: CPTII,S$GLB,, | Performed by: OBSTETRICS & GYNECOLOGY

## 2020-10-20 PROCEDURE — 0502F SUBSEQUENT PRENATAL CARE: CPT | Mod: CPTII,S$GLB,, | Performed by: OBSTETRICS & GYNECOLOGY

## 2020-10-20 PROCEDURE — U0003 INFECTIOUS AGENT DETECTION BY NUCLEIC ACID (DNA OR RNA); SEVERE ACUTE RESPIRATORY SYNDROME CORONAVIRUS 2 (SARS-COV-2) (CORONAVIRUS DISEASE [COVID-19]), AMPLIFIED PROBE TECHNIQUE, MAKING USE OF HIGH THROUGHPUT TECHNOLOGIES AS DESCRIBED BY CMS-2020-01-R: HCPCS

## 2020-10-20 PROCEDURE — 99999 PR PBB SHADOW E&M-EST. PATIENT-LVL III: CPT | Mod: PBBFAC,,, | Performed by: OBSTETRICS & GYNECOLOGY

## 2020-10-20 NOTE — H&P (VIEW-ONLY)
HISTORY AND PHYSICAL                                                OBSTETRICS        Chief Complaint: Induction of Labor     Subjective:      Nallely Liang is a 35 y.o.  female with IUP at 40w6d weeks gestation who presents to L&D for elective induction of labor. Pertinent medical history for this pregnancy includes Rh negative status - Rhogam given on 20.  Patient reports normal fetal movement.  She denies contractions, vaginal bleeding and leakage of fluid.  Care this pregnancy has been with Dr. Martinez    PMHx: No past medical history on file.    PSHx:   Past Surgical History:   Procedure Laterality Date    ANTERIOR CRUCIATE LIGAMENT REPAIR Left        All: Review of patient's allergies indicates:  No Known Allergies    Meds:   No medications prior to admission.       SH:   Social History     Socioeconomic History    Marital status:      Spouse name: Not on file    Number of children: 0    Years of education: Not on file    Highest education level: Not on file   Occupational History    Occupation: nutrition research     Comment: [employer]   Social Needs    Financial resource strain: Not on file    Food insecurity     Worry: Not on file     Inability: Not on file    Transportation needs     Medical: Not on file     Non-medical: Not on file   Tobacco Use    Smoking status: Never Smoker    Smokeless tobacco: Never Used   Substance and Sexual Activity    Alcohol use: Yes     Alcohol/week: 4.0 standard drinks     Types: 4 Standard drinks or equivalent per week     Comment: occasional     Drug use: No    Sexual activity: Yes     Partners: Male     Birth control/protection: None   Lifestyle    Physical activity     Days per week: Not on file     Minutes per session: Not on file    Stress: Not on file   Relationships    Social connections     Talks on phone: Not on file     Gets together: Not on file     Attends Worship service: Not on file      Active member of club or organization: Not on file     Attends meetings of clubs or organizations: Not on file     Relationship status: Not on file   Other Topics Concern    Not on file   Social History Narrative    The patient does exercise regularly (BIW-TIW: dance classes, yoga).      She is satisfied with weight.    Rates diet as good.    She does not drink at least 1/2 gallon water daily.    She drinks 2 coffee/tea/caffeine-containing soft drinks daily.    Total sleep time at night is 7-9 hours.    She works 40-45 hours per week.    She does wear seat belts.    Hobbies include sialing.       FH:   Family History   Problem Relation Age of Onset    Miscarriages / Stillbirths Maternal Grandmother     Stroke Maternal Grandfather     Heart attack Maternal Grandfather     No Known Problems Father     No Known Problems Mother     No Known Problems Brother     No Known Problems Paternal Grandmother     Heart attack Paternal Grandfather     No Known Problems Brother     No Known Problems Brother     Breast cancer Neg Hx        OBHx:   OB History    Para Term  AB Living   2 1 1 0 0 1   SAB TAB Ectopic Multiple Live Births   0 0 0 0 1      # Outcome Date GA Lbr Christophe/2nd Weight Sex Delivery Anes PTL Lv   2 Current            1 Term 18 41w5d  3.63 kg (8 lb) M Vag-Vacuum EPI N MICHAELA      Name: NEFTALI MIRAMONTES      Apgar1: 8  Apgar5: 9      Obstetric Comments   Menarche age 14.    Menses normal and regular.   History of abnormal PAP smear: NO.   History of sexually transmitted disease:  NO       Objective:      [unfilled]  [unfilled]  BMI Readings from Last 1 Encounters:   10/20/20 27.83 kg/m²         General:   alert and cooperative   HEENT:  normocephalic, atraumatic   Lungs:   Normal work of breathing   Heart:   Normal cap refill   Abdomen:  gravid, non-tender   Extremities non-tender, no edema   Derm: no rashes or lesions   Psych: appropriate mood and affect   Pelvis:  adequate        Cervix:     Dilation: 4 cm    Effacement: 60%    Station:  -3    Consistency: moderate    Position mid     Vertex by exam    Lab Review  Blood Type O NEG  GBBS: negative   Rubella:   Lab Results   Component Value Date    RUBELLAIGGAN 53.8 2020    immune  RPR:   RPR   Date Value Ref Range Status   2020 Non-reactive Non-reactive Final      HIV:   Lab Results   Component Value Date    PRL60HVBA Negative 2020     HepB:   Hepatitis B Surface Ag   Date Value Ref Range Status   2020 Negative Negative Final        Assessment:     35 y.o.  at 40w6d weeks gestation here for elective induction of labor   2. Rh negative      Plan:     1. Risks, benefits, alternatives and possible complications of delivery, possible , possible blood transfusion, possible operative vaginal delivery have been discussed in detail with the patient. All questions have been answered, and Ms. Liang has voiced understanding and agrees to the treatment plan.  2. Consents signed and in chart  3. Admit to Labor and Delivery unit

## 2020-10-21 ENCOUNTER — HOSPITAL ENCOUNTER (INPATIENT)
Facility: OTHER | Age: 35
LOS: 2 days | Discharge: HOME | End: 2020-10-23
Attending: OBSTETRICS & GYNECOLOGY | Admitting: OBSTETRICS & GYNECOLOGY
Payer: COMMERCIAL

## 2020-10-21 ENCOUNTER — ANESTHESIA EVENT (OUTPATIENT)
Dept: OBSTETRICS AND GYNECOLOGY | Facility: OTHER | Age: 35
End: 2020-10-21

## 2020-10-21 ENCOUNTER — ANESTHESIA (OUTPATIENT)
Dept: OBSTETRICS AND GYNECOLOGY | Facility: OTHER | Age: 35
End: 2020-10-21

## 2020-10-21 DIAGNOSIS — Z34.90 TERM PREGNANCY: ICD-10-CM

## 2020-10-21 DIAGNOSIS — Z3A.40 40 WEEKS GESTATION OF PREGNANCY: ICD-10-CM

## 2020-10-21 DIAGNOSIS — Z67.91 RH NEGATIVE, ANTEPARTUM: ICD-10-CM

## 2020-10-21 DIAGNOSIS — O26.899 RH NEGATIVE, ANTEPARTUM: ICD-10-CM

## 2020-10-21 LAB
ABO + RH BLD: NORMAL
BASOPHILS # BLD AUTO: 0.04 K/UL (ref 0–0.2)
BASOPHILS NFR BLD: 0.3 % (ref 0–1.9)
BLD GP AB SCN CELLS X3 SERPL QL: NORMAL
BLOOD GROUP ANTIBODIES SERPL: NORMAL
DIFFERENTIAL METHOD: ABNORMAL
EOSINOPHIL # BLD AUTO: 0 K/UL (ref 0–0.5)
EOSINOPHIL NFR BLD: 0.1 % (ref 0–8)
ERYTHROCYTE [DISTWIDTH] IN BLOOD BY AUTOMATED COUNT: 16.2 % (ref 11.5–14.5)
HCT VFR BLD AUTO: 37.5 % (ref 37–48.5)
HGB BLD-MCNC: 12.1 G/DL (ref 12–16)
IMM GRANULOCYTES # BLD AUTO: 0.03 K/UL (ref 0–0.04)
IMM GRANULOCYTES NFR BLD AUTO: 0.3 % (ref 0–0.5)
LYMPHOCYTES # BLD AUTO: 1.7 K/UL (ref 1–4.8)
LYMPHOCYTES NFR BLD: 14.5 % (ref 18–48)
MCH RBC QN AUTO: 26.6 PG (ref 27–31)
MCHC RBC AUTO-ENTMCNC: 32.3 G/DL (ref 32–36)
MCV RBC AUTO: 82 FL (ref 82–98)
MONOCYTES # BLD AUTO: 0.5 K/UL (ref 0.3–1)
MONOCYTES NFR BLD: 4.5 % (ref 4–15)
NEUTROPHILS # BLD AUTO: 9.2 K/UL (ref 1.8–7.7)
NEUTROPHILS NFR BLD: 80.3 % (ref 38–73)
NRBC BLD-RTO: 0 /100 WBC
PLATELET # BLD AUTO: 198 K/UL (ref 150–350)
PMV BLD AUTO: 12.2 FL (ref 9.2–12.9)
RBC # BLD AUTO: 4.55 M/UL (ref 4–5.4)
SARS-COV-2 RDRP RESP QL NAA+PROBE: NEGATIVE
SARS-COV-2 RNA RESP QL NAA+PROBE: NOT DETECTED
WBC # BLD AUTO: 11.43 K/UL (ref 3.9–12.7)

## 2020-10-21 PROCEDURE — 36415 COLL VENOUS BLD VENIPUNCTURE: CPT

## 2020-10-21 PROCEDURE — 86901 BLOOD TYPING SEROLOGIC RH(D): CPT | Mod: 91

## 2020-10-21 PROCEDURE — 88305 TISSUE EXAM BY PATHOLOGIST: CPT | Mod: 26,,, | Performed by: PATHOLOGY

## 2020-10-21 PROCEDURE — 86870 RBC ANTIBODY IDENTIFICATION: CPT

## 2020-10-21 PROCEDURE — 63600175 PHARM REV CODE 636 W HCPCS: Performed by: STUDENT IN AN ORGANIZED HEALTH CARE EDUCATION/TRAINING PROGRAM

## 2020-10-21 PROCEDURE — 63600175 PHARM REV CODE 636 W HCPCS: Performed by: OBSTETRICS & GYNECOLOGY

## 2020-10-21 PROCEDURE — 99285 EMERGENCY DEPT VISIT HI MDM: CPT | Mod: 25

## 2020-10-21 PROCEDURE — 59400 OBSTETRICAL CARE: CPT | Mod: GB,,, | Performed by: OBSTETRICS & GYNECOLOGY

## 2020-10-21 PROCEDURE — 86901 BLOOD TYPING SEROLOGIC RH(D): CPT

## 2020-10-21 PROCEDURE — 11000001 HC ACUTE MED/SURG PRIVATE ROOM

## 2020-10-21 PROCEDURE — 88305 TISSUE EXAM BY PATHOLOGIST: ICD-10-PCS | Mod: 26,,, | Performed by: PATHOLOGY

## 2020-10-21 PROCEDURE — 0502F SUBSEQUENT PRENATAL CARE: CPT | Mod: ,,, | Performed by: OBSTETRICS & GYNECOLOGY

## 2020-10-21 PROCEDURE — U0002 COVID-19 LAB TEST NON-CDC: HCPCS

## 2020-10-21 PROCEDURE — 59025 FETAL NON-STRESS TEST: CPT

## 2020-10-21 PROCEDURE — 57135 EXCISION VAGINAL CYST/TUMOR: CPT | Mod: 51,,, | Performed by: OBSTETRICS & GYNECOLOGY

## 2020-10-21 PROCEDURE — 25000003 PHARM REV CODE 250: Performed by: OBSTETRICS & GYNECOLOGY

## 2020-10-21 PROCEDURE — 72100002 HC LABOR CARE, 1ST 8 HOURS

## 2020-10-21 PROCEDURE — 72200004 HC VAGINAL DELIVERY LEVEL I

## 2020-10-21 PROCEDURE — 85025 COMPLETE CBC W/AUTO DIFF WBC: CPT

## 2020-10-21 PROCEDURE — 85461 HEMOGLOBIN FETAL: CPT

## 2020-10-21 PROCEDURE — 59400 PR FULL ROUT OBSTE CARE,VAGINAL DELIV: ICD-10-PCS | Mod: GB,,, | Performed by: OBSTETRICS & GYNECOLOGY

## 2020-10-21 PROCEDURE — 88305 TISSUE EXAM BY PATHOLOGIST: CPT | Performed by: PATHOLOGY

## 2020-10-21 PROCEDURE — 57135 PR EXCIS VAGINAL CYST/TUMOR: ICD-10-PCS | Mod: 51,,, | Performed by: OBSTETRICS & GYNECOLOGY

## 2020-10-21 PROCEDURE — 0502F PR SUBSEQUENT PRENATAL CARE: ICD-10-PCS | Mod: ,,, | Performed by: OBSTETRICS & GYNECOLOGY

## 2020-10-21 RX ORDER — PRENATAL WITH FERROUS FUM AND FOLIC ACID 3080; 920; 120; 400; 22; 1.84; 3; 20; 10; 1; 12; 200; 27; 25; 2 [IU]/1; [IU]/1; MG/1; [IU]/1; MG/1; MG/1; MG/1; MG/1; MG/1; MG/1; UG/1; MG/1; MG/1; MG/1; MG/1
1 TABLET ORAL DAILY
Status: DISCONTINUED | OUTPATIENT
Start: 2020-10-22 | End: 2020-10-23 | Stop reason: HOSPADM

## 2020-10-21 RX ORDER — HYDROCODONE BITARTRATE AND ACETAMINOPHEN 5; 325 MG/1; MG/1
1 TABLET ORAL EVERY 4 HOURS PRN
Status: DISCONTINUED | OUTPATIENT
Start: 2020-10-21 | End: 2020-10-23 | Stop reason: HOSPADM

## 2020-10-21 RX ORDER — SODIUM CHLORIDE 9 MG/ML
INJECTION, SOLUTION INTRAVENOUS
Status: DISCONTINUED | OUTPATIENT
Start: 2020-10-21 | End: 2020-10-22

## 2020-10-21 RX ORDER — DOCUSATE SODIUM 100 MG/1
200 CAPSULE, LIQUID FILLED ORAL 2 TIMES DAILY PRN
Status: DISCONTINUED | OUTPATIENT
Start: 2020-10-21 | End: 2020-10-23 | Stop reason: HOSPADM

## 2020-10-21 RX ORDER — SIMETHICONE 80 MG
1 TABLET,CHEWABLE ORAL EVERY 6 HOURS PRN
Status: DISCONTINUED | OUTPATIENT
Start: 2020-10-21 | End: 2020-10-23 | Stop reason: HOSPADM

## 2020-10-21 RX ORDER — OXYTOCIN/RINGER'S LACTATE 30/500 ML
334 PLASTIC BAG, INJECTION (ML) INTRAVENOUS ONCE
Status: COMPLETED | OUTPATIENT
Start: 2020-10-21 | End: 2020-10-21

## 2020-10-21 RX ORDER — SIMETHICONE 80 MG
1 TABLET,CHEWABLE ORAL 4 TIMES DAILY PRN
Status: DISCONTINUED | OUTPATIENT
Start: 2020-10-21 | End: 2020-10-23 | Stop reason: HOSPADM

## 2020-10-21 RX ORDER — ONDANSETRON 8 MG/1
8 TABLET, ORALLY DISINTEGRATING ORAL EVERY 8 HOURS PRN
Status: DISCONTINUED | OUTPATIENT
Start: 2020-10-21 | End: 2020-10-23 | Stop reason: HOSPADM

## 2020-10-21 RX ORDER — ACETAMINOPHEN 325 MG/1
650 TABLET ORAL EVERY 6 HOURS PRN
Status: DISCONTINUED | OUTPATIENT
Start: 2020-10-21 | End: 2020-10-23 | Stop reason: HOSPADM

## 2020-10-21 RX ORDER — OXYTOCIN/RINGER'S LACTATE 30/500 ML
2 PLASTIC BAG, INJECTION (ML) INTRAVENOUS CONTINUOUS
Status: DISCONTINUED | OUTPATIENT
Start: 2020-10-21 | End: 2020-10-22

## 2020-10-21 RX ORDER — DIPHENHYDRAMINE HYDROCHLORIDE 50 MG/ML
25 INJECTION INTRAMUSCULAR; INTRAVENOUS EVERY 4 HOURS PRN
Status: DISCONTINUED | OUTPATIENT
Start: 2020-10-21 | End: 2020-10-23 | Stop reason: HOSPADM

## 2020-10-21 RX ORDER — SODIUM CHLORIDE, SODIUM LACTATE, POTASSIUM CHLORIDE, CALCIUM CHLORIDE 600; 310; 30; 20 MG/100ML; MG/100ML; MG/100ML; MG/100ML
INJECTION, SOLUTION INTRAVENOUS CONTINUOUS
Status: DISCONTINUED | OUTPATIENT
Start: 2020-10-21 | End: 2020-10-22

## 2020-10-21 RX ORDER — OXYTOCIN/RINGER'S LACTATE 30/500 ML
95 PLASTIC BAG, INJECTION (ML) INTRAVENOUS ONCE
Status: COMPLETED | OUTPATIENT
Start: 2020-10-21 | End: 2020-10-21

## 2020-10-21 RX ORDER — IBUPROFEN 600 MG/1
600 TABLET ORAL EVERY 6 HOURS
Status: DISCONTINUED | OUTPATIENT
Start: 2020-10-21 | End: 2020-10-23 | Stop reason: HOSPADM

## 2020-10-21 RX ORDER — IBUPROFEN 600 MG/1
600 TABLET ORAL EVERY 6 HOURS
Status: DISCONTINUED | OUTPATIENT
Start: 2020-10-22 | End: 2020-10-21

## 2020-10-21 RX ORDER — CEFAZOLIN SODIUM 2 G/50ML
2 SOLUTION INTRAVENOUS ONCE AS NEEDED
Status: DISCONTINUED | OUTPATIENT
Start: 2020-10-21 | End: 2020-10-22

## 2020-10-21 RX ORDER — DIPHENHYDRAMINE HCL 25 MG
25 CAPSULE ORAL EVERY 4 HOURS PRN
Status: DISCONTINUED | OUTPATIENT
Start: 2020-10-21 | End: 2020-10-23 | Stop reason: HOSPADM

## 2020-10-21 RX ORDER — SODIUM CHLORIDE 9 MG/ML
INJECTION, SOLUTION INTRAVENOUS CONTINUOUS
Status: DISCONTINUED | OUTPATIENT
Start: 2020-10-21 | End: 2020-10-22

## 2020-10-21 RX ORDER — OXYTOCIN/RINGER'S LACTATE 30/500 ML
95 PLASTIC BAG, INJECTION (ML) INTRAVENOUS ONCE
Status: DISCONTINUED | OUTPATIENT
Start: 2020-10-21 | End: 2020-10-22

## 2020-10-21 RX ORDER — HYDROCODONE BITARTRATE AND ACETAMINOPHEN 10; 325 MG/1; MG/1
1 TABLET ORAL EVERY 4 HOURS PRN
Status: DISCONTINUED | OUTPATIENT
Start: 2020-10-21 | End: 2020-10-23 | Stop reason: HOSPADM

## 2020-10-21 RX ORDER — CALCIUM CARBONATE 200(500)MG
500 TABLET,CHEWABLE ORAL 3 TIMES DAILY PRN
Status: DISCONTINUED | OUTPATIENT
Start: 2020-10-21 | End: 2020-10-23 | Stop reason: HOSPADM

## 2020-10-21 RX ORDER — OXYTOCIN/RINGER'S LACTATE 30/500 ML
PLASTIC BAG, INJECTION (ML) INTRAVENOUS
Status: DISPENSED
Start: 2020-10-21 | End: 2020-10-22

## 2020-10-21 RX ADMIN — Medication 95 MILLI-UNITS/MIN: at 07:10

## 2020-10-21 RX ADMIN — Medication 334 MILLI-UNITS/MIN: at 06:10

## 2020-10-21 RX ADMIN — IBUPROFEN 600 MG: 600 TABLET, FILM COATED ORAL at 08:10

## 2020-10-21 NOTE — ANESTHESIA PREPROCEDURE EVALUATION
[facility]  Anesthesia Pre-Operative Evaluation         Patient Name: Nallely Liang  YOB: 1985  MRN: [id number]    10/21/2020      Nallely Liang is a 35 y.o. female  @ 40w5d who presents in active labor. She started feeling contractions and was found to be 5 cm dilated. She states that this pregnancy has been uncomplicated. Her last pregnancy was uncomplicated and culminated in a  with an epidural. She denies HTN, asthma, DM, back surgeries.     OB History    Para Term  AB Living   2 1 1 0 0 1   SAB TAB Ectopic Multiple Live Births   0 0 0 0 1      # Outcome Date GA Lbr Christophe/2nd Weight Sex Delivery Anes PTL Lv   2 Current            1 Term 18 41w5d  3.63 kg (8 lb) M Vag-Vacuum EPI N MICHAELA      Obstetric Comments   Menarche age 14.    Menses normal and regular.   History of abnormal PAP smear: NO.   History of sexually transmitted disease:  NO       Review of patient's allergies indicates:  No Known Allergies    Wt Readings from Last 1 Encounters:   10/21/20 1524 73 kg (161 lb)       BP Readings from Last 3 Encounters:   10/21/20 105/75   10/20/20 102/72   10/13/20 100/62       Patient Active Problem List   Diagnosis    Indication for care in labor or delivery       Past Surgical History:   Procedure Laterality Date    ANTERIOR CRUCIATE LIGAMENT REPAIR Left        Social History     Socioeconomic History    Marital status:      Spouse name: Not on file    Number of children: 0    Years of education: Not on file    Highest education level: Not on file   Occupational History    Occupation: nutrition research     Comment: [employer]   Social Needs    Financial resource strain: Not on file    Food insecurity     Worry: Not on file     Inability: Not on file    Transportation needs     Medical: Not on  file     Non-medical: Not on file   Tobacco Use    Smoking status: Never Smoker    Smokeless tobacco: Never Used   Substance and Sexual Activity    Alcohol use: Yes     Alcohol/week: 4.0 standard drinks     Types: 4 Standard drinks or equivalent per week     Comment: occasional     Drug use: No    Sexual activity: Yes     Partners: Male     Birth control/protection: None   Lifestyle    Physical activity     Days per week: Not on file     Minutes per session: Not on file    Stress: Not on file   Relationships    Social connections     Talks on phone: Not on file     Gets together: Not on file     Attends Orthodoxy service: Not on file     Active member of club or organization: Not on file     Attends meetings of clubs or organizations: Not on file     Relationship status: Not on file   Other Topics Concern    Not on file   Social History Narrative    The patient does exercise regularly (BIW-TIW: dance classes, yoga).      She is satisfied with weight.    Rates diet as good.    She does not drink at least 1/2 gallon water daily.    She drinks 2 coffee/tea/caffeine-containing soft drinks daily.    Total sleep time at night is 7-9 hours.    She works 40-45 hours per week.    She does wear seat belts.    Hobbies include sialing.         Chemistry        Component Value Date/Time     05/31/2017 0950    K 3.9 05/31/2017 0950     05/31/2017 0950    CO2 23 05/31/2017 0950    BUN 8 05/31/2017 0950    CREATININE 0.8 05/31/2017 0950    GLU 77 05/31/2017 0950        Component Value Date/Time    CALCIUM 9.5 05/31/2017 0950    ALKPHOS 37 (L) 05/31/2017 0950    AST 17 05/31/2017 0950    ALT 12 05/31/2017 0950    BILITOT 0.3 05/31/2017 0950    ESTGFRAFRICA >60.0 05/31/2017 0950    EGFRNONAA >60.0 05/31/2017 0950            Lab Results   Component Value Date    WBC 11.43 10/21/2020    HGB 12.1 10/21/2020    HCT 37.5 10/21/2020    MCV 82 10/21/2020     10/21/2020       No results for input(s): PT, INR,  PROTIME, APTT in the last 72 hours.          Anesthesia Evaluation          Review of Systems  Anesthesia Hx:  Denies Family Hx of Anesthesia complications.  Denies Personal Hx of Anesthesia complications.   Social:  Non-Smoker, No Alcohol Use    Cardiovascular:  Cardiovascular Normal     Pulmonary:  Pulmonary Normal    Endocrine:  Endocrine Normal        Physical Exam  General:  Well nourished    Airway/Jaw/Neck:  Airway Findings: Mouth Opening: Normal Mallampati: I  TM Distance: Normal, at least 6 cm            Mental Status:  Mental Status Findings:  Cooperative, Alert and Oriented         Anesthesia Plan  Type of Anesthesia, risks & benefits discussed:  Anesthesia Type:  CSE, epidural, general, spinal  Patient's Preference:   Intra-op Monitoring Plan: standard ASA monitors  Intra-op Monitoring Plan Comments:   Post Op Pain Control Plan: multimodal analgesia  Post Op Pain Control Plan Comments:   Induction:   IV  Beta Blocker:  Patient is not currently on a Beta-Blocker (No further documentation required).       Informed Consent: Patient understands risks and agrees with Anesthesia plan.  Questions answered. Anesthesia consent signed with patient.  ASA Score: 2     Day of Surgery Review of History & Physical:    H&P update referred to the surgeon.         Ready For Surgery From Anesthesia Perspective.

## 2020-10-21 NOTE — ED PROVIDER NOTES
Encounter Date: 10/21/2020       History   No chief complaint on file.    Nallely Liang is a 35 y.o. [id number] at 40w5d presents complaining of contractions. States the contractions started this morning at 4 AM and are now occurring q3 minutes.    This IUP is uncomplicated.  Patient denies contractions, denies vaginal bleeding, denies LOF.   Fetal Movement: normal.          Review of patient's allergies indicates:  No Known Allergies  No past medical history on file.  Past Surgical History:   Procedure Laterality Date    ANTERIOR CRUCIATE LIGAMENT REPAIR Left 2009     Family History   Problem Relation Age of Onset    Miscarriages / Stillbirths Maternal Grandmother     Stroke Maternal Grandfather     Heart attack Maternal Grandfather     No Known Problems Father     No Known Problems Mother     No Known Problems Brother     No Known Problems Paternal Grandmother     Heart attack Paternal Grandfather     No Known Problems Brother     No Known Problems Brother     Breast cancer Neg Hx      Social History     Tobacco Use    Smoking status: Never Smoker    Smokeless tobacco: Never Used   Substance Use Topics    Alcohol use: Yes     Alcohol/week: 4.0 standard drinks     Types: 4 Standard drinks or equivalent per week     Comment: occasional     Drug use: No     Review of Systems   Constitutional: Negative for activity change, chills, diaphoresis, fatigue and fever.   HENT: Negative for trouble swallowing.    Eyes: Negative for photophobia and visual disturbance.   Respiratory: Negative for cough, chest tightness, shortness of breath and wheezing.    Cardiovascular: Negative for chest pain and palpitations.   Gastrointestinal: Negative for abdominal pain, diarrhea, nausea and vomiting.   Genitourinary: Negative for difficulty urinating, dysuria, hematuria, pelvic pain, vaginal bleeding, vaginal discharge and vaginal pain.   Musculoskeletal: Negative for arthralgias and myalgias.   Neurological:  Negative for dizziness, syncope, weakness and light-headedness.       Physical Exam     Initial Vitals   BP Pulse Resp Temp SpO2   10/21/20 1523 10/21/20 1523 -- 10/21/20 1524 10/21/20 1524   117/71 71  98.1 °F (36.7 °C) 98 %      MAP       --                Physical Exam    Vitals reviewed.  Constitutional: She appears well-developed and well-nourished. She is not diaphoretic. No distress.   HENT:   Head: Normocephalic and atraumatic.   Nose: Nose normal.   Eyes: Conjunctivae are normal. Right eye exhibits no discharge. Left eye exhibits no discharge. No scleral icterus.   Neck: Normal range of motion.   Cardiovascular: Normal rate and intact distal pulses.   Pulmonary/Chest: No respiratory distress.   Abdominal:   gravid   Neurological: She is alert and oriented to person, place, and time.   Psychiatric: She has a normal mood and affect. Her behavior is normal. Judgment and thought content normal.     OB LABOR EXAM:   Pre-Term Labor: No.   Membranes ruptured: No.         Dilatation: 5.   Station: -2.   Effacement: 80%.             ED Course   Obtain Fetal nonstress test (NST)    Date/Time: 10/21/2020 3:42 PM  Performed by: Loren Stallworth MD  Authorized by: Jeannette Porras MD     Nonstress Test:     Variability:  6-25 BPM    Decelerations:  None    Accelerations:  15 bpm    Baseline:  130    Contractions:  Regular    Contraction Frequency:  2-3  Biophysical Profile:     Nonstress Test Interpretation: reactive      Overall Impression:  Reassuring      Labs Reviewed   SARS-COV-2 RNA AMPLIFICATION, QUAL          Imaging Results    None          Medical Decision Making:   ED Management:  VSS  NST reactive and reassuring   Cervical exam 5/80/-2  Will admit to labor and delivery   COVID ordered                             Clinical Impression:     ICD-10-CM ICD-9-CM   1. Indication for care in labor or delivery  O75.9 659.90                          ED Disposition Condition    Send to L&D                              Loren Stallworth MD  Resident  10/21/20 1839

## 2020-10-21 NOTE — INTERVAL H&P NOTE
The patient has been examined and the H&P has been reviewed:    I concur with the findings and no changes have occurred since H&P was written.        Active Hospital Problems    Diagnosis  POA    Indication for care in labor or delivery [O75.9]  Yes      Resolved Hospital Problems   No resolved problems to display.

## 2020-10-22 DIAGNOSIS — Z00.00 LABORATORY EXAM ORDERED AS PART OF ROUTINE GENERAL MEDICAL EXAMINATION: ICD-10-CM

## 2020-10-22 DIAGNOSIS — Z11.59 NEED FOR HEPATITIS C SCREENING TEST: Primary | ICD-10-CM

## 2020-10-22 PROBLEM — Z34.90 TERM PREGNANCY: Status: RESOLVED | Noted: 2020-10-21 | Resolved: 2020-10-22

## 2020-10-22 LAB
ABO + RH BLD: NORMAL
BLD GP AB SCN CELLS X3 SERPL QL: NORMAL
FETAL CELL SCN BLD QL ROSETTE: NORMAL
INJECT RH IG VOL PATIENT: NORMAL ML

## 2020-10-22 PROCEDURE — 99024 PR POST-OP FOLLOW-UP VISIT: ICD-10-PCS | Mod: ,,, | Performed by: OBSTETRICS & GYNECOLOGY

## 2020-10-22 PROCEDURE — 63600519 RHOGAM PHARM REV CODE 636 ALT 250 W HCPCS: Performed by: STUDENT IN AN ORGANIZED HEALTH CARE EDUCATION/TRAINING PROGRAM

## 2020-10-22 PROCEDURE — 99024 POSTOP FOLLOW-UP VISIT: CPT | Mod: ,,, | Performed by: OBSTETRICS & GYNECOLOGY

## 2020-10-22 PROCEDURE — 11000001 HC ACUTE MED/SURG PRIVATE ROOM

## 2020-10-22 PROCEDURE — 25000003 PHARM REV CODE 250: Performed by: STUDENT IN AN ORGANIZED HEALTH CARE EDUCATION/TRAINING PROGRAM

## 2020-10-22 PROCEDURE — 25000003 PHARM REV CODE 250: Performed by: OBSTETRICS & GYNECOLOGY

## 2020-10-22 RX ORDER — IBUPROFEN 600 MG/1
600 TABLET ORAL EVERY 6 HOURS
Qty: 40 TABLET | Refills: 0 | Status: SHIPPED | OUTPATIENT
Start: 2020-10-22 | End: 2022-02-16

## 2020-10-22 RX ORDER — HYDROCODONE BITARTRATE AND ACETAMINOPHEN 5; 325 MG/1; MG/1
1 TABLET ORAL EVERY 4 HOURS PRN
Qty: 5 TABLET | Refills: 0 | Status: SHIPPED | OUTPATIENT
Start: 2020-10-22 | End: 2022-02-16

## 2020-10-22 RX ORDER — DOCUSATE SODIUM 100 MG/1
200 CAPSULE, LIQUID FILLED ORAL 2 TIMES DAILY PRN
Qty: 20 CAPSULE | Refills: 0 | Status: SHIPPED | OUTPATIENT
Start: 2020-10-22 | End: 2022-02-16

## 2020-10-22 RX ADMIN — DOCUSATE SODIUM 200 MG: 100 CAPSULE, LIQUID FILLED ORAL at 08:10

## 2020-10-22 RX ADMIN — IBUPROFEN 600 MG: 600 TABLET, FILM COATED ORAL at 05:10

## 2020-10-22 RX ADMIN — IBUPROFEN 600 MG: 600 TABLET, FILM COATED ORAL at 11:10

## 2020-10-22 RX ADMIN — HUMAN RHO(D) IMMUNE GLOBULIN 300 MCG: 300 INJECTION, SOLUTION INTRAMUSCULAR at 08:10

## 2020-10-22 RX ADMIN — IBUPROFEN 600 MG: 600 TABLET, FILM COATED ORAL at 12:10

## 2020-10-22 RX ADMIN — PRENATAL VIT W/ FE FUMARATE-FA TAB 27-0.8 MG 1 TABLET: 27-0.8 TAB at 08:10

## 2020-10-22 NOTE — SUBJECTIVE & OBJECTIVE
Interval History:     She is doing well this morning. She is tolerating a regular diet without nausea or vomiting. She is voiding spontaneously. She is ambulating. She has passed flatus, and has not a BM. Vaginal bleeding is mild. She denies fever or chills. Abdominal pain is mild and controlled with oral medications. She is breastfeeding. She desires circumcision for her male baby: yes, R/B/A of circumcision discussed with both parents, consent signed after questions answered.    Objective:     Vital Signs (Most Recent):  Temp: 97.7 °F (36.5 °C) (10/22/20 0812)  Pulse: 78 (10/22/20 0812)  Resp: 18 (10/22/20 0812)  BP: 99/60 (10/22/20 0812)  SpO2: 97 % (10/22/20 0812) Vital Signs (24h Range):  Temp:  [97.7 °F (36.5 °C)-98.5 °F (36.9 °C)] 97.7 °F (36.5 °C)  Pulse:  [64-98] 78  Resp:  [16-20] 18  SpO2:  [96 %-100 %] 97 %  BP: ()/(55-75) 99/60     Weight: 73 kg (161 lb)  Body mass index is 27.64 kg/m².      Intake/Output Summary (Last 24 hours) at 10/22/2020 1017  Last data filed at 10/21/2020 1951  Gross per 24 hour   Intake --   Output 300 ml   Net -300 ml           Significant Labs:  Lab Results   Component Value Date    GROUPTRH O NEG 10/21/2020    HEPBSAG Negative 03/09/2020    STREPBCULT No Group B Streptococcus isolated 09/22/2020     Recent Labs   Lab 10/21/20  1624   HGB 12.1   HCT 37.5       I have personallly reviewed all pertinent lab results from the last 24 hours.    Physical Exam:   Constitutional: She is oriented to person, place, and time. She appears well-developed and well-nourished.       Cardiovascular: Normal rate.     Pulmonary/Chest: Effort normal.        Abdominal: Soft. She exhibits no abdominal incision. There is no abdominal tenderness (No fundal tenderness).             Musculoskeletal: Edema (1+ edema bilaterally) present. No tenderness.       Neurological: She is alert and oriented to person, place, and time.     Psychiatric: She has a normal mood and affect.

## 2020-10-22 NOTE — HOSPITAL COURSE
Patient progressed to rapid delivery without epidural.  PPD 1: Patient is without complaint, nursing well.  PPD 2: Patient is doing well and is without unusual complaints.

## 2020-10-22 NOTE — PROGRESS NOTES
[facility]  Obstetrics  Postpartum Progress Note    Patient Name: Nallely Liang  MRN: [id number]  Admission Date: 10/21/2020  Hospital Length of Stay: 1 days  Attending Physician: Lyssa Martinez, *  Primary Care Provider: Talon Turcios Jr, MD    Subjective:     Principal Problem: (spontaneous vaginal delivery)    Hospital Course:  Patient progressed to rapid delivery without epidural.  PPD 1: Patient is without complaint, nursing well.    Interval History:     She is doing well this morning. She is tolerating a regular diet without nausea or vomiting. She is voiding spontaneously. She is ambulating. She has passed flatus, and has not a BM. Vaginal bleeding is mild. She denies fever or chills. Abdominal pain is mild and controlled with oral medications. She is breastfeeding. She desires circumcision for her male baby: yes, R/B/A of circumcision discussed with both parents, consent signed after questions answered.    Objective:     Vital Signs (Most Recent):  Temp: 97.7 °F (36.5 °C) (10/22/20 08)  Pulse: 78 (10/22/20 0812)  Resp: 18 (10/22/20 0812)  BP: 99/60 (10/22/20 0812)  SpO2: 97 % (10/22/20 0812) Vital Signs (24h Range):  Temp:  [97.7 °F (36.5 °C)-98.5 °F (36.9 °C)] 97.7 °F (36.5 °C)  Pulse:  [64-98] 78  Resp:  [16-20] 18  SpO2:  [96 %-100 %] 97 %  BP: ()/(55-75) 99/60     Weight: 73 kg (161 lb)  Body mass index is 27.64 kg/m².      Intake/Output Summary (Last 24 hours) at 10/22/2020 1017  Last data filed at 10/21/2020 195  Gross per 24 hour   Intake --   Output 300 ml   Net -300 ml           Significant Labs:  Lab Results   Component Value Date    GROUPTRH O NEG 10/21/2020    HEPBSAG Negative 2020    STREPBCULT No Group B Streptococcus isolated 2020     Recent Labs   Lab 10/21/20  1624   HGB 12.1   HCT 37.5       I have personallly reviewed all pertinent lab results from the last 24 hours.    Physical Exam:   Constitutional: She is oriented to person,  place, and time. She appears well-developed and well-nourished.       Cardiovascular: Normal rate.     Pulmonary/Chest: Effort normal.        Abdominal: Soft. She exhibits no abdominal incision. There is no abdominal tenderness (No fundal tenderness).             Musculoskeletal: Edema (1+ edema bilaterally) present. No tenderness.       Neurological: She is alert and oriented to person, place, and time.     Psychiatric: She has a normal mood and affect.       Assessment/Plan:     35 y.o. female  for:    *  (spontaneous vaginal delivery)  Stable postpartum.        Disposition: As patient meets milestones, will plan to discharge in am.    Carolina Alcantar MD  Obstetrics  [facility]

## 2020-10-22 NOTE — L&D DELIVERY NOTE
[facility]  Vaginal Delivery   Operative Note    SUMMARY     Normal spontaneous vaginal delivery of live infant after pushing for approximately 15 minutes. was placed on mothers abdomen for skin to skin and bulb suctioning performed. Infant delivered position OA over intact perineum. Nuchal cord: Yes, cord reduced following delivery.    Spontaneous delivery of placenta and IV pitocin given noting good uterine tone. Several 1-2 cm lesions, that appeared to be inclusion cysts, were dissected from posterior vaginal wall. Right labial/side wall and small left sulcal tear repaired w/ 2-0 vicryl after injecting lidocaine. Patient tolerated delivery well. Sponge needle and lap counted correctly x2.    Indications:  (spontaneous vaginal delivery)  Pregnancy complicated by:   Patient Active Problem List   Diagnosis     (spontaneous vaginal delivery)    Indication for care in labor or delivery    Term pregnancy     Admitting GA: 40w5d    Delivery Information for Cameron Liang    Birth information:  YOB: 2019   Time of birth: 6:53 PM   Sex: male   Head Delivery Date/Time: 10/21/2020  6:53 PM   Delivery type: Vaginal, Spontaneous   Gestational Age: 40w5d    Delivery Providers    Delivering clinician: Lesly Curiel MD   Provider Role    MD Graciela Santos, RN     Mary Whiteside, GISELLE Sevilla, GISELLE             Measurements    Weight: 3810 g  Length: 54.6 cm  Head circumference: 33 cm  Chest circumference: 35.6 cm         Apgars    Living status: Living  Apgars:  1 min.:  5 min.:  10 min.:  15 min.:  20 min.:    Skin color:  1  1       Heart rate:  2  2       Reflex irritability:  2  2       Muscle tone:  2  2       Respiratory effort:  2  2       Total:  9  9       Apgars assigned by: HERSON         Operative Delivery    Forceps attempted?: No  Vacuum extractor attempted?: No         Shoulder Dystocia    Shoulder dystocia present?: No            Presentation    Presentation: Vertex  Position: Left Occiput Anterior           Interventions/Resuscitation    Method: None       Cord    Vessels: 3 vessels  Complications: None  Delayed Cord Clamping?: Yes  Cord Clamped Date/Time: 10/21/2020  6:55 PM  Cord Blood Disposition: Lab  Gases Sent?: No  Stem Cell Collection (by MD): No       Placenta    Placenta delivery date/time: 10/21/2020 1857  Placenta removal: Spontaneous  Placenta appearance: Intact  Placenta disposition: discarded           Labor Events:       labor: No     Labor Onset Date/Time:         Dilation Complete Date/Time:         Start Pushing Date/Time:       Rupture Date/Time:            Rupture type:          Fluid Amount:       Fluid Color:        steroids: None     Antibiotics given for GBS: No     Induction: none     Indications for induction:        Augmentation:       Indications for augmentation:       Labor complications: None     Additional complications:          Cervical ripening:                     Delivery:      Episiotomy: None     Indication for Episiotomy:       Perineal Lacerations: None Repaired:  Yes   Periurethral Laceration:   Repaired:     Labial Laceration: right Repaired: Yes   Sulcus Laceration: left Repaired: Yes   Vaginal Laceration: Yes Repaired: Yes   Cervical Laceration:   Repaired:     Repair suture:       Repair # of packets: 1     Last Value - EBL - Nursing (mL):       Sum - EBL - Nursing (mL): 0     Last Value - EBL - Anesthesia (mL):      Calculated QBL (mL): 300      Vaginal Sweep Performed: Yes     Surgicount Correct: Yes       Other providers:       Anesthesia    Method: None          Details (if applicable):  Trial of Labor      Categorization:      Priority:     Indications for :     Incision Type:       Additional  information:  Forceps:    Vacuum:    Breech:    Observed anomalies    Other (Comments):            BEN Duke MD  OBGYN PGY2

## 2020-10-22 NOTE — LACTATION NOTE
Baby sleepy. State modulation utilized to stimulate baby to quiet alert state; however, baby remained sleepy. LC discussed hand expression. Pt reports familiarity with process. Denied needing assistance at this time. Lactation Basics education completed. LC reviewed Breastfeeding Guide and encouraged tracking feeds and output. Encouraged use of STS, frequent feeds on demand, and avoiding artificial nipples. Pt verbalized understanding and questions answered. Pt aware to call LC for assistance with feeding.

## 2020-10-23 VITALS
HEIGHT: 64 IN | TEMPERATURE: 98 F | HEART RATE: 87 BPM | WEIGHT: 161 LBS | BODY MASS INDEX: 27.49 KG/M2 | SYSTOLIC BLOOD PRESSURE: 114 MMHG | DIASTOLIC BLOOD PRESSURE: 65 MMHG | OXYGEN SATURATION: 97 % | RESPIRATION RATE: 17 BRPM

## 2020-10-23 PROCEDURE — 25000003 PHARM REV CODE 250: Performed by: STUDENT IN AN ORGANIZED HEALTH CARE EDUCATION/TRAINING PROGRAM

## 2020-10-23 PROCEDURE — 25000003 PHARM REV CODE 250: Performed by: OBSTETRICS & GYNECOLOGY

## 2020-10-23 PROCEDURE — 99024 PR POST-OP FOLLOW-UP VISIT: ICD-10-PCS | Mod: ,,, | Performed by: OBSTETRICS & GYNECOLOGY

## 2020-10-23 PROCEDURE — 99024 POSTOP FOLLOW-UP VISIT: CPT | Mod: ,,, | Performed by: OBSTETRICS & GYNECOLOGY

## 2020-10-23 RX ADMIN — IBUPROFEN 600 MG: 600 TABLET, FILM COATED ORAL at 05:10

## 2020-10-23 RX ADMIN — PRENATAL VIT W/ FE FUMARATE-FA TAB 27-0.8 MG 1 TABLET: 27-0.8 TAB at 08:10

## 2020-10-23 RX ADMIN — DOCUSATE SODIUM 200 MG: 100 CAPSULE, LIQUID FILLED ORAL at 10:10

## 2020-10-23 NOTE — DISCHARGE SUMMARY
[facility]  Obstetrics  Discharge Summary      Patient Name: Nallely Liang  MRN: [id number]  Admission Date: 10/21/2020  Hospital Length of Stay: 2 days  Discharge Date and Time:  10/23/2020 9:43 AM  Attending Physician: Lyssa Martinez, *   Discharging Provider: Sara Sorto MD   Primary Care Provider: Talon Turcios Jr, MD    HPI: 34 yo  presents in spontaneous labor.        * No surgery found *     Hospital Course:   Patient progressed to rapid delivery without epidural.  PPD 1: Patient is without complaint, nursing well.  PPD 2: Patient is doing well and is without unusual complaints.           Final Active Diagnoses:    Diagnosis Date Noted POA    PRINCIPAL PROBLEM:   (spontaneous vaginal delivery) [O80] 2018 Not Applicable      Problems Resolved During this Admission:    Diagnosis Date Noted Date Resolved POA    Indication for care in labor or delivery [O75.9] 10/21/2020 10/22/2020 Yes    Term pregnancy [Z34.90] 10/21/2020 10/22/2020 Not Applicable        Significant Diagnostic Studies: Labs:   CBC   Recent Labs   Lab 10/21/20  1624   WBC 11.43   HGB 12.1   HCT 37.5            Feeding Method: breast    Immunizations     Date Immunization Status Dose Route/Site Given by    10/22/20 0836 Rho (D) Immune Globulin - IM Given 300 mcg Intramuscular/Left upper quad. cortneyeus Gretel Mathis RN          Delivery:    Episiotomy: None   Lacerations: None   Repair suture:     Repair # of packets: 1   Blood loss (ml):       Birth information:  YOB: 2019   Time of birth: 6:53 PM   Sex: male   Delivery type: Vaginal, Spontaneous   Gestational Age: 40w5d    Delivery Clinician:      Other providers:       Additional  information:  Forceps:    Vacuum:    Breech:    Observed anomalies      Living?:           APGARS  One minute Five minutes Ten minutes   Skin color:         Heart rate:         Grimace:         Muscle tone:         Breathing:         Totals:  9  9        Placenta: Delivered:       appearance    Pending Diagnostic Studies:     Procedure Component Value Units Date/Time    Specimen to Pathology, Surgery Gynecology and Obstetrics [[id number]] Collected: 10/21/20 2011    Order Status: Sent Lab Status: In process Updated: 10/22/20 0814          Discharged Condition: good    Disposition: Home or Self Care    Follow Up:  Follow-up Information     Lyssa Martinez MD In 6 weeks.    Specialty: Obstetrics and Gynecology  Why: Postpartum  Contact information:  [facility]  [facility] [id number] [phone]                 Patient Instructions:      Call MD for:  temperature >100.4     Call MD for:  persistent nausea and vomiting or diarrhea     Call MD for:  severe uncontrolled pain     Call MD for:  redness, tenderness, or signs of infection (pain, swelling, redness, odor or green/yellow discharge around incision site)     No dressing needed     Medications:  Current Discharge Medication List      START taking these medications    Details   docusate sodium (COLACE) 100 MG capsule Take 2 capsules (200 mg total) by mouth 2 (two) times daily as needed for Constipation.  Qty: 20 capsule, Refills: 0      HYDROcodone-acetaminophen (NORCO) 5-325 mg per tablet Take 1 tablet by mouth every 4 (four) hours as needed.  Qty: 5 tablet, Refills: 0    Comments: n/a       ibuprofen (ADVIL,MOTRIN) 600 MG tablet Take 1 tablet (600 mg total) by mouth every 6 (six) hours.  Qty: 40 tablet, Refills: 0         CONTINUE these medications which have NOT CHANGED    Details   iron bis-gly/FA/C/B12/Ca/succ (IRON 21/7 ORAL) Take by mouth.      prenatal vit calc,iron,folic (PRENATAL VITAMIN ORAL) Take by mouth.             Sara Sorto MD  Obstetrics  [facility]

## 2020-10-23 NOTE — SUBJECTIVE & OBJECTIVE
She is doing well this morning. She is tolerating a regular diet without nausea or vomiting. She is voiding spontaneously. She is ambulating. She has passed flatus, and has not a BM. Vaginal bleeding is mild. She denies fever or chills. Abdominal pain is mild and controlled with oral medications. She is breastfeeding.     Objective:     Vital Signs (Most Recent):  Temp: 97.7 °F (36.5 °C) (10/23/20 0859)  Pulse: 87 (10/23/20 0859)  Resp: 17 (10/23/20 0859)  BP: 114/65 (10/23/20 0859)  SpO2: 97 % (10/23/20 0859) Vital Signs (24h Range):  Temp:  [97.7 °F (36.5 °C)-98.5 °F (36.9 °C)] 97.7 °F (36.5 °C)  Pulse:  [81-87] 87  Resp:  [16-18] 17  SpO2:  [95 %-97 %] 97 %  BP: ()/(57-65) 114/65     Weight: 73 kg (161 lb)  Body mass index is 27.64 kg/m².      Intake/Output Summary (Last 24 hours) at 10/23/2020 0941  Last data filed at 10/22/2020 1700  Gross per 24 hour   Intake 1480 ml   Output 3600 ml   Net -2120 ml           Significant Labs:  Lab Results   Component Value Date    GROUPTRH O NEG 10/21/2020    HEPBSAG Negative 03/09/2020    STREPBCULT No Group B Streptococcus isolated 09/22/2020     Recent Labs   Lab 10/21/20  1624   HGB 12.1   HCT 37.5       I have personallly reviewed all pertinent lab results from the last 24 hours.    Physical Exam:   Constitutional: She is oriented to person, place, and time. She appears well-developed and well-nourished. No distress.       Cardiovascular: Normal rate.     Pulmonary/Chest: Effort normal. No respiratory distress.        Abdominal: Soft. She exhibits no distension. There is no abdominal tenderness. There is no rebound and no guarding.     Genitourinary:    Uterus normal.             Musculoskeletal: No tenderness or edema.       Neurological: She is alert and oriented to person, place, and time.    Skin: Skin is warm and dry.    Psychiatric: She has a normal mood and affect.

## 2020-10-23 NOTE — PROGRESS NOTES
[facility]  Obstetrics  Postpartum Progress Note    Patient Name: Nallely Liang  MRN: [id number]  Admission Date: 10/21/2020  Hospital Length of Stay: 2 days  Attending Physician: Lyssa Martinez, *  Primary Care Provider: Talon Turcios Jr, MD    Subjective:     Principal Problem: (spontaneous vaginal delivery)    Hospital Course:  Patient progressed to rapid delivery without epidural.  PPD 1: Patient is without complaint, nursing well.  PPD 2: Patient is doing well and is without unusual complaints.          She is doing well this morning. She is tolerating a regular diet without nausea or vomiting. She is voiding spontaneously. She is ambulating. She has passed flatus, and has not a BM. Vaginal bleeding is mild. She denies fever or chills. Abdominal pain is mild and controlled with oral medications. She is breastfeeding.     Objective:     Vital Signs (Most Recent):  Temp: 97.7 °F (36.5 °C) (10/23/20 0859)  Pulse: 87 (10/23/20 0859)  Resp: 17 (10/23/20 0859)  BP: 114/65 (10/23/20 0859)  SpO2: 97 % (10/23/20 0859) Vital Signs (24h Range):  Temp:  [97.7 °F (36.5 °C)-98.5 °F (36.9 °C)] 97.7 °F (36.5 °C)  Pulse:  [81-87] 87  Resp:  [16-18] 17  SpO2:  [95 %-97 %] 97 %  BP: ()/(57-65) 114/65     Weight: 73 kg (161 lb)  Body mass index is 27.64 kg/m².      Intake/Output Summary (Last 24 hours) at 10/23/2020 0941  Last data filed at 10/22/2020 1700  Gross per 24 hour   Intake 1480 ml   Output 3600 ml   Net -2120 ml           Significant Labs:  Lab Results   Component Value Date    GROUPTRH O NEG 10/21/2020    HEPBSAG Negative 2020    STREPBCULT No Group B Streptococcus isolated 2020     Recent Labs   Lab 10/21/20  1624   HGB 12.1   HCT 37.5       I have personallly reviewed all pertinent lab results from the last 24 hours.    Physical Exam:   Constitutional: She is oriented to person, place, and time. She appears well-developed and well-nourished. No distress.        Cardiovascular: Normal rate.     Pulmonary/Chest: Effort normal. No respiratory distress.        Abdominal: Soft. She exhibits no distension. There is no abdominal tenderness. There is no rebound and no guarding.     Genitourinary:    Uterus normal.             Musculoskeletal: No tenderness or edema.       Neurological: She is alert and oriented to person, place, and time.    Skin: Skin is warm and dry.    Psychiatric: She has a normal mood and affect.       Assessment/Plan:     35 y.o. female  for:    *  (spontaneous vaginal delivery)  Stable postpartum.        Disposition: As patient meets milestones, will plan to discharge today.    Sara Sorto MD  Obstetrics  [facility]

## 2020-10-23 NOTE — LACTATION NOTE
10/23/20 0848   Maternal Assessment   Breast Shape Bilateral:;round   Breast Density Bilateral:;soft   Areola Bilateral:;elastic   Nipples Bilateral:;everted   Maternal Infant Feeding   Maternal Emotional State anxious;assist needed   Infant Positioning clutch/football   Signs of Milk Transfer audible swallow;infant jaw motion present   Comfort Measures Before/During Feeding infant position adjusted;latch adjusted;maternal position adjusted;suction broken using finger   Nipple Shape After Feeding, Right round   Latch Assistance yes   Prior to feeding, LC identified chin quiver. Baby in football/clutch. Baby eager to latch; however, mom's reaction time slow. Top lip blanching. LC encouraged Pt to obtain SLP consult if baby continues to face challenges with opening wide and comfortable latch. Tugs and pulls observed with audible swallows noted. LC encouraged breast compression with stimulation.  Lactation discharge education completed. Plan of care is for pt to follow basic breastfeeding education, frequent feeding on demand, and to monitor baby's voids and stools. Breastfeeding guide, including First Alert survey, resource list, and lactation warmline phone number reviewed. Pt to notify doctor for maternal or infant concerns, as reviewed with LC. Pt aware to pump 3-4 times a day if breast feel full after feeding for 10-15 minutes. Request SLP consult if baby continues to jack or chin quiver.Pt verbalizes understanding and questions answered.

## 2020-10-26 LAB
FINAL PATHOLOGIC DIAGNOSIS: NORMAL
GROSS: NORMAL
Lab: NORMAL

## 2020-12-02 ENCOUNTER — OFFICE VISIT (OUTPATIENT)
Dept: OPTOMETRY | Facility: CLINIC | Age: 35
End: 2020-12-02
Payer: COMMERCIAL

## 2020-12-02 ENCOUNTER — POSTPARTUM VISIT (OUTPATIENT)
Dept: OBSTETRICS AND GYNECOLOGY | Facility: CLINIC | Age: 35
End: 2020-12-02
Payer: COMMERCIAL

## 2020-12-02 VITALS
WEIGHT: 132.63 LBS | HEIGHT: 64 IN | SYSTOLIC BLOOD PRESSURE: 100 MMHG | DIASTOLIC BLOOD PRESSURE: 68 MMHG | BODY MASS INDEX: 22.64 KG/M2

## 2020-12-02 DIAGNOSIS — Z46.0 FITTING AND ADJUSTMENT OF SPECTACLES AND CONTACT LENSES: Primary | ICD-10-CM

## 2020-12-02 DIAGNOSIS — Z46.0 FITTING AND ADJUSTMENT OF SPECTACLES AND CONTACT LENSES: ICD-10-CM

## 2020-12-02 DIAGNOSIS — H52.13 MYOPIA, BILATERAL: Primary | ICD-10-CM

## 2020-12-02 DIAGNOSIS — Z04.9 DISEASE RULED OUT AFTER EXAMINATION: ICD-10-CM

## 2020-12-02 PROCEDURE — 92015 PR REFRACTION: ICD-10-PCS | Mod: S$GLB,,, | Performed by: OPTOMETRIST

## 2020-12-02 PROCEDURE — 99999 PR PBB SHADOW E&M-EST. PATIENT-LVL I: CPT | Mod: PBBFAC,,, | Performed by: OPTOMETRIST

## 2020-12-02 PROCEDURE — 92014 PR EYE EXAM, EST PATIENT,COMPREHESV: ICD-10-PCS | Mod: S$GLB,,, | Performed by: OPTOMETRIST

## 2020-12-02 PROCEDURE — 99999 PR PBB SHADOW E&M-EST. PATIENT-LVL I: ICD-10-PCS | Mod: PBBFAC,,, | Performed by: OPTOMETRIST

## 2020-12-02 PROCEDURE — 99999 PR PBB SHADOW E&M-EST. PATIENT-LVL III: ICD-10-PCS | Mod: PBBFAC,,, | Performed by: OBSTETRICS & GYNECOLOGY

## 2020-12-02 PROCEDURE — 92014 COMPRE OPH EXAM EST PT 1/>: CPT | Mod: S$GLB,,, | Performed by: OPTOMETRIST

## 2020-12-02 PROCEDURE — 92310 PR CONTACT LENS FITTING (NO CHANGE): ICD-10-PCS | Mod: CSM,,, | Performed by: OPTOMETRIST

## 2020-12-02 PROCEDURE — 99999 PR PBB SHADOW E&M-EST. PATIENT-LVL III: CPT | Mod: PBBFAC,,, | Performed by: OBSTETRICS & GYNECOLOGY

## 2020-12-02 PROCEDURE — 92310 CONTACT LENS FITTING OU: CPT | Mod: CSM,,, | Performed by: OPTOMETRIST

## 2020-12-02 PROCEDURE — 0503F PR POSTPARTUM CARE VISIT: ICD-10-PCS | Mod: S$GLB,,, | Performed by: OBSTETRICS & GYNECOLOGY

## 2020-12-02 PROCEDURE — 0503F POSTPARTUM CARE VISIT: CPT | Mod: S$GLB,,, | Performed by: OBSTETRICS & GYNECOLOGY

## 2020-12-02 PROCEDURE — 92015 DETERMINE REFRACTIVE STATE: CPT | Mod: S$GLB,,, | Performed by: OPTOMETRIST

## 2020-12-02 NOTE — PROGRESS NOTES
HPI     Pt here for annual visit and clfit   Patient denies diplopia, headaches, flashes/floaters, pain, and   itching/burning/tearing.    Pt does not use any eye drops.      Last edited by Sandrita Laird on 12/2/2020  3:38 PM. (History)            Assessment /Plan     For exam results, see Encounter Report.    Myopia, bilateral    Fitting and adjustment of spectacles and contact lenses    Disease ruled out after examination      Rx specs   CL fit today  Dispensed trials  Ok to order if happy with vision and comfort OU    RTC 1 year, sooner PRN

## 2020-12-02 NOTE — PROGRESS NOTES
"    Subjective:      Nallely Liang is a 35 y.o.  who presents for a postpartum visit.  She is status post  uncomplicated vaginal delivery 6 weeks ago.  Her hospitalization was not complicated.  She is breastfeeding.  She desires condoms for contraception.  She denies any signs and symptoms of postpartum depression.    Her last pap was NILM, HPV neg on 10/2019    History reviewed. No pertinent past medical history.    Objective:     /68   Ht 5' 4" (1.626 m)   Wt 60.2 kg (132 lb 9.7 oz)   LMP 2020   Breastfeeding Yes   BMI 22.76 kg/m²     General: healthy, no distress  Abdomen: Normal, benign.  External Genitalia: normal, well-healed, without lesions or masses  Vagina: normal, well-healed, physiologic discharge, without lesions    Assessment:     Postpartum care and examination        Plan:     1. Return to clinic in 10 months for annual exam    "

## 2021-01-20 ENCOUNTER — PATIENT MESSAGE (OUTPATIENT)
Dept: OBSTETRICS AND GYNECOLOGY | Facility: CLINIC | Age: 36
End: 2021-01-20

## 2021-03-11 ENCOUNTER — TELEPHONE (OUTPATIENT)
Dept: OBSTETRICS AND GYNECOLOGY | Facility: CLINIC | Age: 36
End: 2021-03-11

## 2021-03-19 ENCOUNTER — OFFICE VISIT (OUTPATIENT)
Dept: OBSTETRICS AND GYNECOLOGY | Facility: CLINIC | Age: 36
End: 2021-03-19
Attending: OBSTETRICS & GYNECOLOGY
Payer: COMMERCIAL

## 2021-03-19 VITALS
WEIGHT: 126.31 LBS | SYSTOLIC BLOOD PRESSURE: 112 MMHG | DIASTOLIC BLOOD PRESSURE: 66 MMHG | HEIGHT: 64 IN | BODY MASS INDEX: 21.56 KG/M2

## 2021-03-19 DIAGNOSIS — L92.9 GRANULATION TISSUE AT OBSTETRICAL LACERATION SITE: Primary | ICD-10-CM

## 2021-03-19 PROCEDURE — 1126F AMNT PAIN NOTED NONE PRSNT: CPT | Mod: S$GLB,,, | Performed by: OBSTETRICS & GYNECOLOGY

## 2021-03-19 PROCEDURE — 3008F PR BODY MASS INDEX (BMI) DOCUMENTED: ICD-10-PCS | Mod: CPTII,S$GLB,, | Performed by: OBSTETRICS & GYNECOLOGY

## 2021-03-19 PROCEDURE — 99213 OFFICE O/P EST LOW 20 MIN: CPT | Mod: S$GLB,,, | Performed by: OBSTETRICS & GYNECOLOGY

## 2021-03-19 PROCEDURE — 1126F PR PAIN SEVERITY QUANTIFIED, NO PAIN PRESENT: ICD-10-PCS | Mod: S$GLB,,, | Performed by: OBSTETRICS & GYNECOLOGY

## 2021-03-19 PROCEDURE — 99213 PR OFFICE/OUTPT VISIT, EST, LEVL III, 20-29 MIN: ICD-10-PCS | Mod: S$GLB,,, | Performed by: OBSTETRICS & GYNECOLOGY

## 2021-03-19 PROCEDURE — 99999 PR PBB SHADOW E&M-EST. PATIENT-LVL III: ICD-10-PCS | Mod: PBBFAC,,, | Performed by: OBSTETRICS & GYNECOLOGY

## 2021-03-19 PROCEDURE — 99999 PR PBB SHADOW E&M-EST. PATIENT-LVL III: CPT | Mod: PBBFAC,,, | Performed by: OBSTETRICS & GYNECOLOGY

## 2021-03-19 PROCEDURE — 3008F BODY MASS INDEX DOCD: CPT | Mod: CPTII,S$GLB,, | Performed by: OBSTETRICS & GYNECOLOGY

## 2021-11-16 ENCOUNTER — OFFICE VISIT (OUTPATIENT)
Dept: OBSTETRICS AND GYNECOLOGY | Facility: CLINIC | Age: 36
End: 2021-11-16
Payer: COMMERCIAL

## 2021-11-16 VITALS
BODY MASS INDEX: 21.32 KG/M2 | HEIGHT: 64 IN | WEIGHT: 124.88 LBS | DIASTOLIC BLOOD PRESSURE: 62 MMHG | SYSTOLIC BLOOD PRESSURE: 96 MMHG

## 2021-11-16 DIAGNOSIS — Z31.69 PRE-CONCEPTION COUNSELING: ICD-10-CM

## 2021-11-16 DIAGNOSIS — Z01.419 ENCOUNTER FOR ANNUAL ROUTINE GYNECOLOGICAL EXAMINATION: Primary | ICD-10-CM

## 2021-11-16 PROCEDURE — 99999 PR PBB SHADOW E&M-EST. PATIENT-LVL III: CPT | Mod: PBBFAC,,, | Performed by: OBSTETRICS & GYNECOLOGY

## 2021-11-16 PROCEDURE — 99395 PR PREVENTIVE VISIT,EST,18-39: ICD-10-PCS | Mod: S$GLB,,, | Performed by: OBSTETRICS & GYNECOLOGY

## 2021-11-16 PROCEDURE — 99999 PR PBB SHADOW E&M-EST. PATIENT-LVL III: ICD-10-PCS | Mod: PBBFAC,,, | Performed by: OBSTETRICS & GYNECOLOGY

## 2021-11-16 PROCEDURE — 99395 PREV VISIT EST AGE 18-39: CPT | Mod: S$GLB,,, | Performed by: OBSTETRICS & GYNECOLOGY

## 2021-11-29 ENCOUNTER — TELEPHONE (OUTPATIENT)
Dept: OBSTETRICS AND GYNECOLOGY | Facility: CLINIC | Age: 36
End: 2021-11-29

## 2021-12-14 ENCOUNTER — PROCEDURE VISIT (OUTPATIENT)
Dept: OBSTETRICS AND GYNECOLOGY | Facility: CLINIC | Age: 36
End: 2021-12-14
Payer: COMMERCIAL

## 2021-12-14 ENCOUNTER — OFFICE VISIT (OUTPATIENT)
Dept: OBSTETRICS AND GYNECOLOGY | Facility: CLINIC | Age: 36
End: 2021-12-14
Payer: COMMERCIAL

## 2021-12-14 VITALS
BODY MASS INDEX: 21.51 KG/M2 | DIASTOLIC BLOOD PRESSURE: 66 MMHG | HEIGHT: 64 IN | SYSTOLIC BLOOD PRESSURE: 102 MMHG | WEIGHT: 126 LBS

## 2021-12-14 DIAGNOSIS — Z36.9 ENCOUNTER FOR ANTENATAL SCREENING, UNSPECIFIED: ICD-10-CM

## 2021-12-14 DIAGNOSIS — Z36.9 ENCOUNTER FOR ANTENATAL SCREENING, UNSPECIFIED: Primary | ICD-10-CM

## 2021-12-14 PROCEDURE — 87591 N.GONORRHOEAE DNA AMP PROB: CPT | Performed by: OBSTETRICS & GYNECOLOGY

## 2021-12-14 PROCEDURE — 99999 PR PBB SHADOW E&M-EST. PATIENT-LVL III: CPT | Mod: PBBFAC,,, | Performed by: OBSTETRICS & GYNECOLOGY

## 2021-12-14 PROCEDURE — 87491 CHLMYD TRACH DNA AMP PROBE: CPT | Performed by: OBSTETRICS & GYNECOLOGY

## 2021-12-14 PROCEDURE — 76801 US OB/GYN EXTENDED PROCEDURE (VIEWPOINT): ICD-10-PCS | Mod: S$GLB,,, | Performed by: OBSTETRICS & GYNECOLOGY

## 2021-12-14 PROCEDURE — 76801 OB US < 14 WKS SINGLE FETUS: CPT | Mod: S$GLB,,, | Performed by: OBSTETRICS & GYNECOLOGY

## 2021-12-14 PROCEDURE — 99999 PR PBB SHADOW E&M-EST. PATIENT-LVL III: ICD-10-PCS | Mod: PBBFAC,,, | Performed by: OBSTETRICS & GYNECOLOGY

## 2021-12-14 PROCEDURE — 87086 URINE CULTURE/COLONY COUNT: CPT | Performed by: OBSTETRICS & GYNECOLOGY

## 2021-12-14 PROCEDURE — 99213 PR OFFICE/OUTPT VISIT, EST, LEVL III, 20-29 MIN: ICD-10-PCS | Mod: S$GLB,,, | Performed by: OBSTETRICS & GYNECOLOGY

## 2021-12-14 PROCEDURE — 99213 OFFICE O/P EST LOW 20 MIN: CPT | Mod: S$GLB,,, | Performed by: OBSTETRICS & GYNECOLOGY

## 2021-12-16 LAB
BACTERIA UR CULT: NORMAL
BACTERIA UR CULT: NORMAL

## 2021-12-17 LAB
C TRACH DNA SPEC QL NAA+PROBE: NOT DETECTED
N GONORRHOEA DNA SPEC QL NAA+PROBE: NOT DETECTED

## 2022-01-05 ENCOUNTER — INITIAL PRENATAL (OUTPATIENT)
Dept: OBSTETRICS AND GYNECOLOGY | Facility: CLINIC | Age: 37
End: 2022-01-05
Payer: COMMERCIAL

## 2022-01-05 ENCOUNTER — PATIENT MESSAGE (OUTPATIENT)
Dept: ADMINISTRATIVE | Facility: OTHER | Age: 37
End: 2022-01-05
Payer: COMMERCIAL

## 2022-01-05 ENCOUNTER — LAB VISIT (OUTPATIENT)
Dept: LAB | Facility: OTHER | Age: 37
End: 2022-01-05
Attending: OBSTETRICS & GYNECOLOGY
Payer: COMMERCIAL

## 2022-01-05 VITALS
DIASTOLIC BLOOD PRESSURE: 68 MMHG | WEIGHT: 125.13 LBS | BODY MASS INDEX: 21.48 KG/M2 | SYSTOLIC BLOOD PRESSURE: 104 MMHG

## 2022-01-05 DIAGNOSIS — Z36.9 ENCOUNTER FOR ANTENATAL SCREENING, UNSPECIFIED: ICD-10-CM

## 2022-01-05 DIAGNOSIS — O26.899 HEADACHE IN PREGNANCY, ANTEPARTUM: ICD-10-CM

## 2022-01-05 DIAGNOSIS — O26.899 RH NEGATIVE, ANTEPARTUM: Primary | ICD-10-CM

## 2022-01-05 DIAGNOSIS — R51.9 HEADACHE IN PREGNANCY, ANTEPARTUM: ICD-10-CM

## 2022-01-05 DIAGNOSIS — Z3A.10 10 WEEKS GESTATION OF PREGNANCY: ICD-10-CM

## 2022-01-05 DIAGNOSIS — Z67.91 RH NEGATIVE, ANTEPARTUM: Primary | ICD-10-CM

## 2022-01-05 LAB
ABO + RH BLD: NORMAL
BASOPHILS # BLD AUTO: 0.06 K/UL (ref 0–0.2)
BASOPHILS NFR BLD: 0.6 % (ref 0–1.9)
BLD GP AB SCN CELLS X3 SERPL QL: NORMAL
DIFFERENTIAL METHOD: ABNORMAL
EOSINOPHIL # BLD AUTO: 0 K/UL (ref 0–0.5)
EOSINOPHIL NFR BLD: 0.3 % (ref 0–8)
ERYTHROCYTE [DISTWIDTH] IN BLOOD BY AUTOMATED COUNT: 14.6 % (ref 11.5–14.5)
HCT VFR BLD AUTO: 37.3 % (ref 37–48.5)
HGB BLD-MCNC: 12.3 G/DL (ref 12–16)
IMM GRANULOCYTES # BLD AUTO: 0.03 K/UL (ref 0–0.04)
IMM GRANULOCYTES NFR BLD AUTO: 0.3 % (ref 0–0.5)
LYMPHOCYTES # BLD AUTO: 1.6 K/UL (ref 1–4.8)
LYMPHOCYTES NFR BLD: 15.5 % (ref 18–48)
MCH RBC QN AUTO: 27.2 PG (ref 27–31)
MCHC RBC AUTO-ENTMCNC: 33 G/DL (ref 32–36)
MCV RBC AUTO: 83 FL (ref 82–98)
MONOCYTES # BLD AUTO: 0.4 K/UL (ref 0.3–1)
MONOCYTES NFR BLD: 3.9 % (ref 4–15)
NEUTROPHILS # BLD AUTO: 8.2 K/UL (ref 1.8–7.7)
NEUTROPHILS NFR BLD: 79.4 % (ref 38–73)
NRBC BLD-RTO: 0 /100 WBC
PLATELET # BLD AUTO: 296 K/UL (ref 150–450)
PMV BLD AUTO: 10.1 FL (ref 9.2–12.9)
RBC # BLD AUTO: 4.52 M/UL (ref 4–5.4)
WBC # BLD AUTO: 10.3 K/UL (ref 3.9–12.7)

## 2022-01-05 PROCEDURE — 99999 PR PBB SHADOW E&M-EST. PATIENT-LVL II: CPT | Mod: PBBFAC,,, | Performed by: OBSTETRICS & GYNECOLOGY

## 2022-01-05 PROCEDURE — 86850 RBC ANTIBODY SCREEN: CPT | Performed by: OBSTETRICS & GYNECOLOGY

## 2022-01-05 PROCEDURE — 85025 COMPLETE CBC W/AUTO DIFF WBC: CPT | Performed by: OBSTETRICS & GYNECOLOGY

## 2022-01-05 PROCEDURE — 86592 SYPHILIS TEST NON-TREP QUAL: CPT | Performed by: OBSTETRICS & GYNECOLOGY

## 2022-01-05 PROCEDURE — 87340 HEPATITIS B SURFACE AG IA: CPT | Performed by: OBSTETRICS & GYNECOLOGY

## 2022-01-05 PROCEDURE — 36415 COLL VENOUS BLD VENIPUNCTURE: CPT | Performed by: OBSTETRICS & GYNECOLOGY

## 2022-01-05 PROCEDURE — 99999 PR PBB SHADOW E&M-EST. PATIENT-LVL II: ICD-10-PCS | Mod: PBBFAC,,, | Performed by: OBSTETRICS & GYNECOLOGY

## 2022-01-05 PROCEDURE — 86762 RUBELLA ANTIBODY: CPT | Performed by: OBSTETRICS & GYNECOLOGY

## 2022-01-05 PROCEDURE — 0500F INITIAL PRENATAL CARE VISIT: CPT | Mod: CPTII,S$GLB,, | Performed by: OBSTETRICS & GYNECOLOGY

## 2022-01-05 PROCEDURE — 0500F PR INITIAL PRENATAL CARE VISIT: ICD-10-PCS | Mod: CPTII,S$GLB,, | Performed by: OBSTETRICS & GYNECOLOGY

## 2022-01-05 PROCEDURE — 87389 HIV-1 AG W/HIV-1&-2 AB AG IA: CPT | Performed by: OBSTETRICS & GYNECOLOGY

## 2022-01-05 PROCEDURE — 86900 BLOOD TYPING SEROLOGIC ABO: CPT | Performed by: OBSTETRICS & GYNECOLOGY

## 2022-01-05 NOTE — PROGRESS NOTES
10w0d with complaints of nausea, HAs, fatigue - all improving.   Initial labs drawn today. Plan for quad screen at 16 weeks.   Re-enrolled in connected [facility].   RTC in 2 weeks for routine PNC - then to follow CM schedule.

## 2022-01-06 LAB
HBV SURFACE AG SERPL QL IA: NEGATIVE
HIV 1+2 AB+HIV1 P24 AG SERPL QL IA: NEGATIVE
RPR SER QL: NORMAL
RUBV IGG SER-ACNC: 50.4 IU/ML
RUBV IGG SER-IMP: REACTIVE

## 2022-01-19 ENCOUNTER — ROUTINE PRENATAL (OUTPATIENT)
Dept: OBSTETRICS AND GYNECOLOGY | Facility: CLINIC | Age: 37
End: 2022-01-19
Payer: COMMERCIAL

## 2022-01-19 VITALS
DIASTOLIC BLOOD PRESSURE: 62 MMHG | WEIGHT: 127.56 LBS | BODY MASS INDEX: 21.89 KG/M2 | SYSTOLIC BLOOD PRESSURE: 114 MMHG

## 2022-01-19 DIAGNOSIS — Z13.79 GENETIC SCREENING: Primary | ICD-10-CM

## 2022-01-19 DIAGNOSIS — Z3A.12 12 WEEKS GESTATION OF PREGNANCY: ICD-10-CM

## 2022-01-19 PROCEDURE — 99999 PR PBB SHADOW E&M-EST. PATIENT-LVL II: ICD-10-PCS | Mod: PBBFAC,,, | Performed by: OBSTETRICS & GYNECOLOGY

## 2022-01-19 PROCEDURE — 0502F SUBSEQUENT PRENATAL CARE: CPT | Mod: CPTII,S$GLB,, | Performed by: OBSTETRICS & GYNECOLOGY

## 2022-01-19 PROCEDURE — 0502F PR SUBSEQUENT PRENATAL CARE: ICD-10-PCS | Mod: CPTII,S$GLB,, | Performed by: OBSTETRICS & GYNECOLOGY

## 2022-01-19 PROCEDURE — 99999 PR PBB SHADOW E&M-EST. PATIENT-LVL II: CPT | Mod: PBBFAC,,, | Performed by: OBSTETRICS & GYNECOLOGY

## 2022-01-19 NOTE — PROGRESS NOTES
12w0d without major complaints.   Nausea improving - fatigue still significant.   Spouse has had COVID and Nallely has had little  help.   Quad screen next visit.   Re-enrolling in [employer] [facility] today.   RTC in 4 weeks for routine PNC.

## 2022-01-26 ENCOUNTER — PATIENT MESSAGE (OUTPATIENT)
Dept: MATERNAL FETAL MEDICINE | Facility: CLINIC | Age: 37
End: 2022-01-26
Payer: COMMERCIAL

## 2022-02-16 ENCOUNTER — OFFICE VISIT (OUTPATIENT)
Dept: OPTOMETRY | Facility: CLINIC | Age: 37
End: 2022-02-16
Payer: COMMERCIAL

## 2022-02-16 ENCOUNTER — LAB VISIT (OUTPATIENT)
Dept: LAB | Facility: OTHER | Age: 37
End: 2022-02-16
Attending: OBSTETRICS & GYNECOLOGY
Payer: COMMERCIAL

## 2022-02-16 ENCOUNTER — ROUTINE PRENATAL (OUTPATIENT)
Dept: OBSTETRICS AND GYNECOLOGY | Facility: CLINIC | Age: 37
End: 2022-02-16
Payer: COMMERCIAL

## 2022-02-16 VITALS
BODY MASS INDEX: 22.82 KG/M2 | DIASTOLIC BLOOD PRESSURE: 62 MMHG | WEIGHT: 132.94 LBS | SYSTOLIC BLOOD PRESSURE: 102 MMHG

## 2022-02-16 DIAGNOSIS — Z04.9 DISEASE RULED OUT AFTER EXAMINATION: ICD-10-CM

## 2022-02-16 DIAGNOSIS — Z46.0 FITTING AND ADJUSTMENT OF SPECTACLES AND CONTACT LENSES: Primary | ICD-10-CM

## 2022-02-16 DIAGNOSIS — O26.899 RH NEGATIVE, ANTEPARTUM: Primary | ICD-10-CM

## 2022-02-16 DIAGNOSIS — Z13.79 GENETIC SCREENING: ICD-10-CM

## 2022-02-16 DIAGNOSIS — Z3A.16 16 WEEKS GESTATION OF PREGNANCY: ICD-10-CM

## 2022-02-16 DIAGNOSIS — Z46.0 FITTING AND ADJUSTMENT OF SPECTACLES AND CONTACT LENSES: ICD-10-CM

## 2022-02-16 DIAGNOSIS — H52.13 MYOPIA, BILATERAL: Primary | ICD-10-CM

## 2022-02-16 DIAGNOSIS — Z67.91 RH NEGATIVE, ANTEPARTUM: Primary | ICD-10-CM

## 2022-02-16 PROCEDURE — 99999 PR PBB SHADOW E&M-EST. PATIENT-LVL II: ICD-10-PCS | Mod: PBBFAC,,, | Performed by: OPTOMETRIST

## 2022-02-16 PROCEDURE — 92015 PR REFRACTION: ICD-10-PCS | Mod: S$GLB,,, | Performed by: OPTOMETRIST

## 2022-02-16 PROCEDURE — 1159F MED LIST DOCD IN RCRD: CPT | Mod: CPTII,S$GLB,, | Performed by: OPTOMETRIST

## 2022-02-16 PROCEDURE — 92015 DETERMINE REFRACTIVE STATE: CPT | Mod: S$GLB,,, | Performed by: OPTOMETRIST

## 2022-02-16 PROCEDURE — 99999 PR PBB SHADOW E&M-EST. PATIENT-LVL II: ICD-10-PCS | Mod: PBBFAC,,, | Performed by: OBSTETRICS & GYNECOLOGY

## 2022-02-16 PROCEDURE — 1160F RVW MEDS BY RX/DR IN RCRD: CPT | Mod: CPTII,S$GLB,, | Performed by: OPTOMETRIST

## 2022-02-16 PROCEDURE — 81511 FTL CGEN ABNOR FOUR ANAL: CPT | Performed by: OBSTETRICS & GYNECOLOGY

## 2022-02-16 PROCEDURE — 99999 PR PBB SHADOW E&M-EST. PATIENT-LVL II: CPT | Mod: PBBFAC,,, | Performed by: OPTOMETRIST

## 2022-02-16 PROCEDURE — 1160F PR REVIEW ALL MEDS BY PRESCRIBER/CLIN PHARMACIST DOCUMENTED: ICD-10-PCS | Mod: CPTII,S$GLB,, | Performed by: OPTOMETRIST

## 2022-02-16 PROCEDURE — 92014 COMPRE OPH EXAM EST PT 1/>: CPT | Mod: S$GLB,,, | Performed by: OPTOMETRIST

## 2022-02-16 PROCEDURE — 92310 CONTACT LENS FITTING OU: CPT | Mod: CSM,,, | Performed by: OPTOMETRIST

## 2022-02-16 PROCEDURE — 1159F PR MEDICATION LIST DOCUMENTED IN MEDICAL RECORD: ICD-10-PCS | Mod: CPTII,S$GLB,, | Performed by: OPTOMETRIST

## 2022-02-16 PROCEDURE — 0502F SUBSEQUENT PRENATAL CARE: CPT | Mod: CPTII,S$GLB,, | Performed by: OBSTETRICS & GYNECOLOGY

## 2022-02-16 PROCEDURE — 36415 COLL VENOUS BLD VENIPUNCTURE: CPT | Performed by: OBSTETRICS & GYNECOLOGY

## 2022-02-16 PROCEDURE — 92310 PR CONTACT LENS FITTING (NO CHANGE): ICD-10-PCS | Mod: CSM,,, | Performed by: OPTOMETRIST

## 2022-02-16 PROCEDURE — 92014 PR EYE EXAM, EST PATIENT,COMPREHESV: ICD-10-PCS | Mod: S$GLB,,, | Performed by: OPTOMETRIST

## 2022-02-16 PROCEDURE — 0502F PR SUBSEQUENT PRENATAL CARE: ICD-10-PCS | Mod: CPTII,S$GLB,, | Performed by: OBSTETRICS & GYNECOLOGY

## 2022-02-16 PROCEDURE — 99999 PR PBB SHADOW E&M-EST. PATIENT-LVL II: CPT | Mod: PBBFAC,,, | Performed by: OBSTETRICS & GYNECOLOGY

## 2022-02-16 NOTE — PROGRESS NOTES
HPI     Patient feels like her va in her glasses seems to be more blurry for the   distance, lately.    Myopia, bilateral    No gtts    Last edited by Pool Garcia on 2/16/2022 10:40 AM. (History)            Assessment /Plan     For exam results, see Encounter Report.    Myopia, bilateral    Fitting and adjustment of spectacles and contact lenses    Disease ruled out after examination      Rx specs  CL fit today: dispensed trials of new Rx  Ok to order if happy with vision and comfort OU    RTC 1 year, sooner PRN

## 2022-02-16 NOTE — PROGRESS NOTES
16w0d without major complaints.   Anatomy scan scheduled for next month.   RTC in 8 weeks for routine PNC.   1 hour GCT and CBC next visit.

## 2022-02-22 LAB
# FETUSES US: NORMAL
2ND TRIMESTER 4 SCREEN PNL SERPL: NEGATIVE
2ND TRIMESTER 4 SCREEN SERPL-IMP: NORMAL
AFP MOM SERPL: 0.9
AFP SERPL-MCNC: 33.9 NG/ML
AGE AT DELIVERY: 36
B-HCG MOM SERPL: 0.83
B-HCG SERPL-ACNC: 36.6 IU/ML
FET TS 21 RISK FROM MAT AGE: NORMAL
GA (DAYS): 0 D
GA (WEEKS): 16 WK
GA METHOD: NORMAL
IDDM PATIENT QL: NORMAL
INHIBIN A MOM SERPL: 1.29
INHIBIN A SERPL-MCNC: 213.3 PG/ML
SMOKING STATUS FTND: NO
TS 18 RISK FETUS: NORMAL
TS 21 RISK FETUS: NORMAL
U ESTRIOL MOM SERPL: 0.69
U ESTRIOL SERPL-MCNC: 0.7 NG/ML

## 2022-03-10 ENCOUNTER — PATIENT MESSAGE (OUTPATIENT)
Dept: MATERNAL FETAL MEDICINE | Facility: CLINIC | Age: 37
End: 2022-03-10
Payer: COMMERCIAL

## 2022-03-11 ENCOUNTER — PROCEDURE VISIT (OUTPATIENT)
Dept: MATERNAL FETAL MEDICINE | Facility: CLINIC | Age: 37
End: 2022-03-11
Payer: COMMERCIAL

## 2022-03-11 DIAGNOSIS — Z3A.12 12 WEEKS GESTATION OF PREGNANCY: ICD-10-CM

## 2022-03-11 PROCEDURE — 76811 US MFM PROCEDURE (VIEWPOINT): ICD-10-PCS | Mod: S$GLB,,, | Performed by: OBSTETRICS & GYNECOLOGY

## 2022-03-11 PROCEDURE — 76811 OB US DETAILED SNGL FETUS: CPT | Mod: S$GLB,,, | Performed by: OBSTETRICS & GYNECOLOGY

## 2022-04-01 DIAGNOSIS — Z36.2 ENCOUNTER FOR FOLLOW-UP ULTRASOUND OF FETAL ANATOMY: Primary | ICD-10-CM

## 2022-04-11 ENCOUNTER — PATIENT MESSAGE (OUTPATIENT)
Dept: MATERNAL FETAL MEDICINE | Facility: CLINIC | Age: 37
End: 2022-04-11
Payer: COMMERCIAL

## 2022-04-12 ENCOUNTER — ROUTINE PRENATAL (OUTPATIENT)
Dept: OBSTETRICS AND GYNECOLOGY | Facility: CLINIC | Age: 37
End: 2022-04-12
Payer: COMMERCIAL

## 2022-04-12 ENCOUNTER — PROCEDURE VISIT (OUTPATIENT)
Dept: MATERNAL FETAL MEDICINE | Facility: CLINIC | Age: 37
End: 2022-04-12
Payer: COMMERCIAL

## 2022-04-12 ENCOUNTER — LAB VISIT (OUTPATIENT)
Dept: LAB | Facility: OTHER | Age: 37
End: 2022-04-12
Attending: OBSTETRICS & GYNECOLOGY
Payer: COMMERCIAL

## 2022-04-12 VITALS
BODY MASS INDEX: 24.24 KG/M2 | WEIGHT: 141.19 LBS | SYSTOLIC BLOOD PRESSURE: 108 MMHG | DIASTOLIC BLOOD PRESSURE: 60 MMHG

## 2022-04-12 DIAGNOSIS — Z36.2 ENCOUNTER FOR FOLLOW-UP ULTRASOUND OF FETAL ANATOMY: ICD-10-CM

## 2022-04-12 DIAGNOSIS — Z3A.16 16 WEEKS GESTATION OF PREGNANCY: ICD-10-CM

## 2022-04-12 DIAGNOSIS — Z3A.23 23 WEEKS GESTATION OF PREGNANCY: Primary | ICD-10-CM

## 2022-04-12 DIAGNOSIS — O26.899 RH NEGATIVE, ANTEPARTUM: ICD-10-CM

## 2022-04-12 DIAGNOSIS — Z67.91 RH NEGATIVE, ANTEPARTUM: ICD-10-CM

## 2022-04-12 LAB
BASOPHILS # BLD AUTO: 0.05 K/UL (ref 0–0.2)
BASOPHILS NFR BLD: 0.5 % (ref 0–1.9)
DIFFERENTIAL METHOD: ABNORMAL
EOSINOPHIL # BLD AUTO: 0.1 K/UL (ref 0–0.5)
EOSINOPHIL NFR BLD: 0.7 % (ref 0–8)
ERYTHROCYTE [DISTWIDTH] IN BLOOD BY AUTOMATED COUNT: 13.7 % (ref 11.5–14.5)
GLUCOSE SERPL-MCNC: 107 MG/DL (ref 70–140)
HCT VFR BLD AUTO: 35.5 % (ref 37–48.5)
HGB BLD-MCNC: 11.6 G/DL (ref 12–16)
IMM GRANULOCYTES # BLD AUTO: 0.06 K/UL (ref 0–0.04)
IMM GRANULOCYTES NFR BLD AUTO: 0.6 % (ref 0–0.5)
LYMPHOCYTES # BLD AUTO: 1.8 K/UL (ref 1–4.8)
LYMPHOCYTES NFR BLD: 17 % (ref 18–48)
MCH RBC QN AUTO: 28.6 PG (ref 27–31)
MCHC RBC AUTO-ENTMCNC: 32.7 G/DL (ref 32–36)
MCV RBC AUTO: 87 FL (ref 82–98)
MONOCYTES # BLD AUTO: 0.5 K/UL (ref 0.3–1)
MONOCYTES NFR BLD: 4.5 % (ref 4–15)
NEUTROPHILS # BLD AUTO: 8.3 K/UL (ref 1.8–7.7)
NEUTROPHILS NFR BLD: 76.7 % (ref 38–73)
NRBC BLD-RTO: 0 /100 WBC
PLATELET # BLD AUTO: 218 K/UL (ref 150–450)
PMV BLD AUTO: 11.1 FL (ref 9.2–12.9)
RBC # BLD AUTO: 4.06 M/UL (ref 4–5.4)
WBC # BLD AUTO: 10.73 K/UL (ref 3.9–12.7)

## 2022-04-12 PROCEDURE — 99999 PR PBB SHADOW E&M-EST. PATIENT-LVL II: CPT | Mod: PBBFAC,,, | Performed by: NURSE PRACTITIONER

## 2022-04-12 PROCEDURE — 0502F PR SUBSEQUENT PRENATAL CARE: ICD-10-PCS | Mod: CPTII,S$GLB,, | Performed by: NURSE PRACTITIONER

## 2022-04-12 PROCEDURE — 36415 COLL VENOUS BLD VENIPUNCTURE: CPT | Performed by: OBSTETRICS & GYNECOLOGY

## 2022-04-12 PROCEDURE — 76816 US MFM PROCEDURE (VIEWPOINT): ICD-10-PCS | Mod: S$GLB,,, | Performed by: OBSTETRICS & GYNECOLOGY

## 2022-04-12 PROCEDURE — 85025 COMPLETE CBC W/AUTO DIFF WBC: CPT | Performed by: OBSTETRICS & GYNECOLOGY

## 2022-04-12 PROCEDURE — 99999 PR PBB SHADOW E&M-EST. PATIENT-LVL II: ICD-10-PCS | Mod: PBBFAC,,, | Performed by: NURSE PRACTITIONER

## 2022-04-12 PROCEDURE — 76816 OB US FOLLOW-UP PER FETUS: CPT | Mod: S$GLB,,, | Performed by: OBSTETRICS & GYNECOLOGY

## 2022-04-12 PROCEDURE — 0502F SUBSEQUENT PRENATAL CARE: CPT | Mod: CPTII,S$GLB,, | Performed by: NURSE PRACTITIONER

## 2022-04-12 PROCEDURE — 82950 GLUCOSE TEST: CPT | Performed by: OBSTETRICS & GYNECOLOGY

## 2022-04-12 NOTE — PROGRESS NOTES
Presents at 23w6d for WILLIAM. Excellent FM, denies VB, LOF, ctx. Mild round ligament pain- recommended belly band. 1hGTT/CBC today. FU in 4 weeks for WILLIAM.

## 2022-04-25 ENCOUNTER — OFFICE VISIT (OUTPATIENT)
Dept: FAMILY MEDICINE | Facility: CLINIC | Age: 37
End: 2022-04-25
Payer: COMMERCIAL

## 2022-04-25 ENCOUNTER — PATIENT MESSAGE (OUTPATIENT)
Dept: FAMILY MEDICINE | Facility: CLINIC | Age: 37
End: 2022-04-25

## 2022-04-25 DIAGNOSIS — J01.00 ACUTE NON-RECURRENT MAXILLARY SINUSITIS: Primary | ICD-10-CM

## 2022-04-25 PROCEDURE — 99214 OFFICE O/P EST MOD 30 MIN: CPT | Mod: 95,,, | Performed by: FAMILY MEDICINE

## 2022-04-25 PROCEDURE — 99214 PR OFFICE/OUTPT VISIT, EST, LEVL IV, 30-39 MIN: ICD-10-PCS | Mod: 95,,, | Performed by: FAMILY MEDICINE

## 2022-04-25 PROCEDURE — 1160F PR REVIEW ALL MEDS BY PRESCRIBER/CLIN PHARMACIST DOCUMENTED: ICD-10-PCS | Mod: CPTII,95,, | Performed by: FAMILY MEDICINE

## 2022-04-25 PROCEDURE — 1159F PR MEDICATION LIST DOCUMENTED IN MEDICAL RECORD: ICD-10-PCS | Mod: CPTII,95,, | Performed by: FAMILY MEDICINE

## 2022-04-25 PROCEDURE — 1159F MED LIST DOCD IN RCRD: CPT | Mod: CPTII,95,, | Performed by: FAMILY MEDICINE

## 2022-04-25 PROCEDURE — 1160F RVW MEDS BY RX/DR IN RCRD: CPT | Mod: CPTII,95,, | Performed by: FAMILY MEDICINE

## 2022-04-25 RX ORDER — FLUTICASONE PROPIONATE 50 MCG
1 SPRAY, SUSPENSION (ML) NASAL DAILY
Qty: 9.9 G | Refills: 3 | Status: SHIPPED | OUTPATIENT
Start: 2022-04-25 | End: 2022-09-14

## 2022-04-25 RX ORDER — AMOXICILLIN 875 MG/1
875 TABLET, FILM COATED ORAL EVERY 12 HOURS
Qty: 14 TABLET | Refills: 0 | Status: SHIPPED | OUTPATIENT
Start: 2022-04-25 | End: 2022-05-02

## 2022-04-25 NOTE — PROGRESS NOTES
Chief Complaint   Patient presents with    URI     X10 days, worsening in last 24-48hrs            The patient location is: home  The chief complaint leading to consultation is:   Chief Complaint   Patient presents with    URI     X10 days, worsening in last 24-48hrs       Visit type: Virtual visit with synchronous audio and video           Face to Face time with patient:      30 minutes of total time spent on the encounter, which includes face to face time and non-face to face time preparing to see the patient (eg, review of tests), Obtaining and/or reviewing separately obtained history, Documenting clinical information in the electronic or other health record, Independently interpreting results (not separately reported) and communicating results to the patient/family/caregiver, or Care coordination (not separately reported).           Each patient to whom I provide medical services by telemedicine is:  (1) informed of the relationship between the physician and patient and the respective role of any other health care provider with respect to management of the patient; and (2) notified that they may decline to receive medical services by telemedicine and may withdraw from such care at any time. Patient verbally consented to receive this service via  synchronous audio and video      HPI    36 y.o. female with following complaints/concerns:    *URI sx-began about 7-10 days ago, but in the last 24-48hrs worsening sx manuela R>L facial pain  *is pregnant 25W5D  *  *     URI   This is a new problem. The current episode started 1 to 4 weeks ago. The problem has been gradually worsening. Associated symptoms include congestion, coughing, headaches, a plugged ear sensation, rhinorrhea and sinus pain. Pertinent negatives include no chest pain, ear pain, rash, sore throat or wheezing.   Cough  This is a new problem. The current episode started in the past 7 days. The problem has been unchanged. The problem occurs every few  minutes. The cough is productive of sputum. Associated symptoms include headaches, myalgias, nasal congestion and rhinorrhea. Pertinent negatives include no chest pain, chills, ear congestion, ear pain, fever, heartburn, hemoptysis, postnasal drip, rash, sore throat, shortness of breath, sweats, weight loss or wheezing. There is no history of asthma, bronchiectasis, bronchitis, COPD, emphysema, environmental allergies or pneumonia.       Review of patient's allergies indicates:  No Known Allergies   Outpatient Medications as of 4/25/2022   Medication Sig Dispense Refill    iron bis-gly/FA/C/B12/Ca/succ (IRON 21/7 ORAL) Take by mouth.      prenatal vit calc,iron,folic (PRENATAL VITAMIN ORAL) Take by mouth.       No current facility-administered medications on file as of 4/25/2022.      History reviewed. No pertinent past medical history.  Past Surgical History:   Procedure Laterality Date    ANTERIOR CRUCIATE LIGAMENT REPAIR Left 2009     Social History     Tobacco Use    Smoking status: Never Smoker    Smokeless tobacco: Never Used   Substance Use Topics    Alcohol use: Yes     Alcohol/week: 4.0 standard drinks     Types: 4 Standard drinks or equivalent per week     Comment: occasional     Drug use: No       Review of Systems   Constitutional: Negative for chills, fever and weight loss.   HENT: Positive for congestion, rhinorrhea and sinus pain. Negative for ear pain, postnasal drip and sore throat.    Respiratory: Positive for cough. Negative for hemoptysis, shortness of breath and wheezing.    Cardiovascular: Negative for chest pain.   Gastrointestinal: Negative for heartburn.   Musculoskeletal: Positive for myalgias.   Skin: Negative for rash.   Neurological: Positive for headaches.   Endo/Heme/Allergies: Negative for environmental allergies.      No Vitals available as patient not present in clinic    Physical Exam  Constitutional:       General: She is awake.      Appearance: Normal appearance. She is  well-developed and well-groomed.   HENT:      Nose:      Right Sinus: Maxillary sinus tenderness present. No frontal sinus tenderness.      Left Sinus: No maxillary sinus tenderness or frontal sinus tenderness.   Neurological:      Mental Status: She is alert and oriented to person, place, and time.   Psychiatric:         Attention and Perception: Attention and perception normal.         Mood and Affect: Mood and affect normal.         Speech: Speech normal.         Behavior: Behavior normal. Behavior is cooperative.         Thought Content: Thought content normal.         Cognition and Memory: Cognition and memory normal.         Judgment: Judgment normal.           I have checked the patient's  prior to prescribing opioids and/or other controlled substances.        ASSESSMENT and PLAN    Nallely was seen today for uri.    Diagnoses and all orders for this visit:    Acute non-recurrent maxillary sinusitis  -     fluticasone propionate (FLONASE) 50 mcg/actuation nasal spray; 1 spray (50 mcg total) by Each Nostril route once daily.  -     amoxicillin (AMOXIL) 875 MG tablet; Take 1 tablet (875 mg total) by mouth every 12 (twelve) hours. for 7 days            Follow up if symptoms worsen or fail to improve.      Total time spent: 30minutes    Available Notes, Procedures and Results, including Labs/Imaging, from the last 3 months were reviewed.    Risks, benefits, and side effects were discussed with the patient. All questions were answered to the fullest satisfaction of the patient, and pt verbalized understanding and agreement to treatment plan. Pt was to call with any new or worsening symptoms, or present to the ER.              Candida Vallejo M.D.  Family Medicine Physician  [facility]

## 2022-04-26 ENCOUNTER — PATIENT MESSAGE (OUTPATIENT)
Dept: OBSTETRICS AND GYNECOLOGY | Facility: CLINIC | Age: 37
End: 2022-04-26
Payer: COMMERCIAL

## 2022-05-11 ENCOUNTER — ROUTINE PRENATAL (OUTPATIENT)
Dept: OBSTETRICS AND GYNECOLOGY | Facility: CLINIC | Age: 37
End: 2022-05-11
Payer: COMMERCIAL

## 2022-05-11 ENCOUNTER — PATIENT MESSAGE (OUTPATIENT)
Dept: ADMINISTRATIVE | Facility: OTHER | Age: 37
End: 2022-05-11
Payer: COMMERCIAL

## 2022-05-11 ENCOUNTER — LAB VISIT (OUTPATIENT)
Dept: LAB | Facility: OTHER | Age: 37
End: 2022-05-11
Attending: OBSTETRICS & GYNECOLOGY
Payer: COMMERCIAL

## 2022-05-11 ENCOUNTER — CLINICAL SUPPORT (OUTPATIENT)
Dept: OBSTETRICS AND GYNECOLOGY | Facility: CLINIC | Age: 37
End: 2022-05-11
Payer: COMMERCIAL

## 2022-05-11 VITALS — DIASTOLIC BLOOD PRESSURE: 64 MMHG | WEIGHT: 144.19 LBS | BODY MASS INDEX: 24.75 KG/M2 | SYSTOLIC BLOOD PRESSURE: 92 MMHG

## 2022-05-11 DIAGNOSIS — O26.899 RH NEGATIVE STATE IN ANTEPARTUM PERIOD: ICD-10-CM

## 2022-05-11 DIAGNOSIS — Z23 NEED FOR TDAP VACCINATION: Primary | ICD-10-CM

## 2022-05-11 DIAGNOSIS — O09.523 MULTIGRAVIDA OF ADVANCED MATERNAL AGE IN THIRD TRIMESTER: ICD-10-CM

## 2022-05-11 DIAGNOSIS — Z67.91 RH NEGATIVE STATE IN ANTEPARTUM PERIOD: ICD-10-CM

## 2022-05-11 DIAGNOSIS — Z23 NEED FOR TDAP VACCINATION: ICD-10-CM

## 2022-05-11 DIAGNOSIS — Z67.91 RH NEGATIVE STATE IN ANTEPARTUM PERIOD: Primary | ICD-10-CM

## 2022-05-11 DIAGNOSIS — O26.899 RH NEGATIVE STATE IN ANTEPARTUM PERIOD: Primary | ICD-10-CM

## 2022-05-11 DIAGNOSIS — Z3A.28 28 WEEKS GESTATION OF PREGNANCY: ICD-10-CM

## 2022-05-11 DIAGNOSIS — Z29.13 NEED FOR RHOGAM DUE TO RH NEGATIVE MOTHER: ICD-10-CM

## 2022-05-11 LAB
ABO + RH BLD: NORMAL
BLD GP AB SCN CELLS X3 SERPL QL: NORMAL

## 2022-05-11 PROCEDURE — 0502F PR SUBSEQUENT PRENATAL CARE: ICD-10-PCS | Mod: CPTII,S$GLB,, | Performed by: OBSTETRICS & GYNECOLOGY

## 2022-05-11 PROCEDURE — 86901 BLOOD TYPING SEROLOGIC RH(D): CPT | Performed by: OBSTETRICS & GYNECOLOGY

## 2022-05-11 PROCEDURE — 90471 TDAP VACCINE GREATER THAN OR EQUAL TO 7YO IM: ICD-10-PCS | Mod: S$GLB,,, | Performed by: OBSTETRICS & GYNECOLOGY

## 2022-05-11 PROCEDURE — 96372 RHO (D) IMMUNE GLOBULIN: ICD-10-PCS | Mod: 59,S$GLB,, | Performed by: OBSTETRICS & GYNECOLOGY

## 2022-05-11 PROCEDURE — 99999 PR PBB SHADOW E&M-EST. PATIENT-LVL I: ICD-10-PCS | Mod: PBBFAC,,,

## 2022-05-11 PROCEDURE — 90715 TDAP VACCINE GREATER THAN OR EQUAL TO 7YO IM: ICD-10-PCS | Mod: S$GLB,,, | Performed by: OBSTETRICS & GYNECOLOGY

## 2022-05-11 PROCEDURE — 90715 TDAP VACCINE 7 YRS/> IM: CPT | Mod: S$GLB,,, | Performed by: OBSTETRICS & GYNECOLOGY

## 2022-05-11 PROCEDURE — 90471 IMMUNIZATION ADMIN: CPT | Mod: S$GLB,,, | Performed by: OBSTETRICS & GYNECOLOGY

## 2022-05-11 PROCEDURE — 99999 PR PBB SHADOW E&M-EST. PATIENT-LVL II: ICD-10-PCS | Mod: PBBFAC,,, | Performed by: OBSTETRICS & GYNECOLOGY

## 2022-05-11 PROCEDURE — 96372 THER/PROPH/DIAG INJ SC/IM: CPT | Mod: 59,S$GLB,, | Performed by: OBSTETRICS & GYNECOLOGY

## 2022-05-11 PROCEDURE — 99999 PR PBB SHADOW E&M-EST. PATIENT-LVL I: CPT | Mod: PBBFAC,,,

## 2022-05-11 PROCEDURE — 99999 PR PBB SHADOW E&M-EST. PATIENT-LVL II: CPT | Mod: PBBFAC,,, | Performed by: OBSTETRICS & GYNECOLOGY

## 2022-05-11 PROCEDURE — 36415 COLL VENOUS BLD VENIPUNCTURE: CPT | Performed by: OBSTETRICS & GYNECOLOGY

## 2022-05-11 PROCEDURE — 0502F SUBSEQUENT PRENATAL CARE: CPT | Mod: CPTII,S$GLB,, | Performed by: OBSTETRICS & GYNECOLOGY

## 2022-05-11 NOTE — PROGRESS NOTES
28w0d without major complaints.   Tdap given today. Rhogam given today.  RTC in 3 weeks for growth 2/2 AMA and marginal cord insertion. This will be slightly before 32 weeks gestation, but patient is leaving town for 3 weeks following that visit.

## 2022-05-11 NOTE — PROGRESS NOTES
Ordering Provider: Dr. Martinez    Pt received Tdap to LEFT deltoid.  Pt tolerated injection well.  Pt was instructed to wait 15 minutes to monitor for signs and symptoms of any reactions.  Pt has no further questions, comments, or concerns at this time.

## 2022-05-11 NOTE — PROGRESS NOTES
Ordering Provider: Dr. Martinez    Pt received Rhogam to LEFT glut.  Pt tolerated injection well.  Pt was instructed to wait 15 minutes to monitor for signs and symptoms of any reactions.  Pt has no further questions, comments, or concerns at this time.

## 2022-05-30 ENCOUNTER — TELEPHONE (OUTPATIENT)
Dept: OBSTETRICS AND GYNECOLOGY | Facility: CLINIC | Age: 37
End: 2022-05-30

## 2022-06-01 ENCOUNTER — PROCEDURE VISIT (OUTPATIENT)
Dept: OBSTETRICS AND GYNECOLOGY | Facility: CLINIC | Age: 37
End: 2022-06-01
Payer: COMMERCIAL

## 2022-06-01 DIAGNOSIS — Z13.79 GENETIC SCREENING: ICD-10-CM

## 2022-06-01 PROCEDURE — 76816 US OB/GYN EXTENDED PROCEDURE (VIEWPOINT): ICD-10-PCS | Mod: S$GLB,,, | Performed by: OBSTETRICS & GYNECOLOGY

## 2022-06-01 PROCEDURE — 76816 OB US FOLLOW-UP PER FETUS: CPT | Mod: S$GLB,,, | Performed by: OBSTETRICS & GYNECOLOGY

## 2022-06-08 ENCOUNTER — PATIENT MESSAGE (OUTPATIENT)
Dept: ADMINISTRATIVE | Facility: OTHER | Age: 37
End: 2022-06-08
Payer: COMMERCIAL

## 2022-06-21 NOTE — PROGRESS NOTES
34w0d without major complaints.   U/S today for growth 2/2 AMA and marginal cord insertion shows EFW of 30%ile.   PPC: Likely Mirena.  RTC in 2 weeks for routine PNC.

## 2022-06-22 ENCOUNTER — ROUTINE PRENATAL (OUTPATIENT)
Dept: OBSTETRICS AND GYNECOLOGY | Facility: CLINIC | Age: 37
End: 2022-06-22
Payer: COMMERCIAL

## 2022-06-22 ENCOUNTER — PROCEDURE VISIT (OUTPATIENT)
Dept: OBSTETRICS AND GYNECOLOGY | Facility: CLINIC | Age: 37
End: 2022-06-22
Payer: COMMERCIAL

## 2022-06-22 VITALS — SYSTOLIC BLOOD PRESSURE: 98 MMHG | DIASTOLIC BLOOD PRESSURE: 64 MMHG | WEIGHT: 147.69 LBS | BODY MASS INDEX: 25.35 KG/M2

## 2022-06-22 DIAGNOSIS — Z30.430 ENCOUNTER FOR IUD INSERTION: ICD-10-CM

## 2022-06-22 DIAGNOSIS — O43.199 MARGINAL INSERTION OF UMBILICAL CORD AFFECTING MANAGEMENT OF MOTHER: ICD-10-CM

## 2022-06-22 DIAGNOSIS — O26.899 RH NEGATIVE STATE IN ANTEPARTUM PERIOD: ICD-10-CM

## 2022-06-22 DIAGNOSIS — O09.523 MULTIGRAVIDA OF ADVANCED MATERNAL AGE IN THIRD TRIMESTER: ICD-10-CM

## 2022-06-22 DIAGNOSIS — Z67.91 RH NEGATIVE STATE IN ANTEPARTUM PERIOD: ICD-10-CM

## 2022-06-22 DIAGNOSIS — O09.523 MULTIGRAVIDA OF ADVANCED MATERNAL AGE IN THIRD TRIMESTER: Primary | ICD-10-CM

## 2022-06-22 DIAGNOSIS — Z3A.34 34 WEEKS GESTATION OF PREGNANCY: ICD-10-CM

## 2022-06-22 PROCEDURE — 0502F PR SUBSEQUENT PRENATAL CARE: ICD-10-PCS | Mod: CPTII,S$GLB,, | Performed by: OBSTETRICS & GYNECOLOGY

## 2022-06-22 PROCEDURE — 0502F SUBSEQUENT PRENATAL CARE: CPT | Mod: CPTII,S$GLB,, | Performed by: OBSTETRICS & GYNECOLOGY

## 2022-06-22 PROCEDURE — 76816 OB US FOLLOW-UP PER FETUS: CPT | Mod: S$GLB,,, | Performed by: OBSTETRICS & GYNECOLOGY

## 2022-06-22 PROCEDURE — 76816 US OB/GYN EXTENDED PROCEDURE (VIEWPOINT): ICD-10-PCS | Mod: S$GLB,,, | Performed by: OBSTETRICS & GYNECOLOGY

## 2022-06-22 PROCEDURE — 99999 PR PBB SHADOW E&M-EST. PATIENT-LVL II: ICD-10-PCS | Mod: PBBFAC,,, | Performed by: OBSTETRICS & GYNECOLOGY

## 2022-06-22 PROCEDURE — 99999 PR PBB SHADOW E&M-EST. PATIENT-LVL II: CPT | Mod: PBBFAC,,, | Performed by: OBSTETRICS & GYNECOLOGY

## 2022-07-06 ENCOUNTER — LAB VISIT (OUTPATIENT)
Dept: LAB | Facility: OTHER | Age: 37
End: 2022-07-06
Attending: OBSTETRICS & GYNECOLOGY
Payer: COMMERCIAL

## 2022-07-06 ENCOUNTER — ROUTINE PRENATAL (OUTPATIENT)
Dept: OBSTETRICS AND GYNECOLOGY | Facility: CLINIC | Age: 37
End: 2022-07-06
Payer: COMMERCIAL

## 2022-07-06 VITALS
SYSTOLIC BLOOD PRESSURE: 100 MMHG | WEIGHT: 154.63 LBS | DIASTOLIC BLOOD PRESSURE: 60 MMHG | BODY MASS INDEX: 26.55 KG/M2

## 2022-07-06 DIAGNOSIS — O26.899 RH NEGATIVE STATE IN ANTEPARTUM PERIOD: ICD-10-CM

## 2022-07-06 DIAGNOSIS — Z3A.36 36 WEEKS GESTATION OF PREGNANCY: ICD-10-CM

## 2022-07-06 DIAGNOSIS — Z36.85 ANTENATAL SCREENING FOR STREPTOCOCCUS B: ICD-10-CM

## 2022-07-06 DIAGNOSIS — O43.199 MARGINAL INSERTION OF UMBILICAL CORD AFFECTING MANAGEMENT OF MOTHER: ICD-10-CM

## 2022-07-06 DIAGNOSIS — Z67.91 RH NEGATIVE STATE IN ANTEPARTUM PERIOD: ICD-10-CM

## 2022-07-06 DIAGNOSIS — O09.523 MULTIGRAVIDA OF ADVANCED MATERNAL AGE IN THIRD TRIMESTER: Primary | ICD-10-CM

## 2022-07-06 LAB
BASOPHILS # BLD AUTO: 0.03 K/UL (ref 0–0.2)
BASOPHILS NFR BLD: 0.3 % (ref 0–1.9)
DIFFERENTIAL METHOD: ABNORMAL
EOSINOPHIL # BLD AUTO: 0.1 K/UL (ref 0–0.5)
EOSINOPHIL NFR BLD: 0.8 % (ref 0–8)
ERYTHROCYTE [DISTWIDTH] IN BLOOD BY AUTOMATED COUNT: 13.3 % (ref 11.5–14.5)
HCT VFR BLD AUTO: 37.6 % (ref 37–48.5)
HGB BLD-MCNC: 12.3 G/DL (ref 12–16)
IMM GRANULOCYTES # BLD AUTO: 0.07 K/UL (ref 0–0.04)
IMM GRANULOCYTES NFR BLD AUTO: 0.7 % (ref 0–0.5)
LYMPHOCYTES # BLD AUTO: 1.7 K/UL (ref 1–4.8)
LYMPHOCYTES NFR BLD: 16.4 % (ref 18–48)
MCH RBC QN AUTO: 27.5 PG (ref 27–31)
MCHC RBC AUTO-ENTMCNC: 32.7 G/DL (ref 32–36)
MCV RBC AUTO: 84 FL (ref 82–98)
MONOCYTES # BLD AUTO: 0.6 K/UL (ref 0.3–1)
MONOCYTES NFR BLD: 5.9 % (ref 4–15)
NEUTROPHILS # BLD AUTO: 8 K/UL (ref 1.8–7.7)
NEUTROPHILS NFR BLD: 75.9 % (ref 38–73)
NRBC BLD-RTO: 0 /100 WBC
PLATELET # BLD AUTO: 203 K/UL (ref 150–450)
PMV BLD AUTO: 12.2 FL (ref 9.2–12.9)
RBC # BLD AUTO: 4.47 M/UL (ref 4–5.4)
WBC # BLD AUTO: 10.55 K/UL (ref 3.9–12.7)

## 2022-07-06 PROCEDURE — 87389 HIV-1 AG W/HIV-1&-2 AB AG IA: CPT | Performed by: OBSTETRICS & GYNECOLOGY

## 2022-07-06 PROCEDURE — 86592 SYPHILIS TEST NON-TREP QUAL: CPT | Performed by: OBSTETRICS & GYNECOLOGY

## 2022-07-06 PROCEDURE — 87081 CULTURE SCREEN ONLY: CPT | Performed by: OBSTETRICS & GYNECOLOGY

## 2022-07-06 PROCEDURE — 36415 COLL VENOUS BLD VENIPUNCTURE: CPT | Performed by: OBSTETRICS & GYNECOLOGY

## 2022-07-06 PROCEDURE — 99999 PR PBB SHADOW E&M-EST. PATIENT-LVL II: CPT | Mod: PBBFAC,,, | Performed by: OBSTETRICS & GYNECOLOGY

## 2022-07-06 PROCEDURE — 0502F SUBSEQUENT PRENATAL CARE: CPT | Mod: CPTII,S$GLB,, | Performed by: OBSTETRICS & GYNECOLOGY

## 2022-07-06 PROCEDURE — 99999 PR PBB SHADOW E&M-EST. PATIENT-LVL II: ICD-10-PCS | Mod: PBBFAC,,, | Performed by: OBSTETRICS & GYNECOLOGY

## 2022-07-06 PROCEDURE — 85025 COMPLETE CBC W/AUTO DIFF WBC: CPT | Performed by: OBSTETRICS & GYNECOLOGY

## 2022-07-06 PROCEDURE — 0502F PR SUBSEQUENT PRENATAL CARE: ICD-10-PCS | Mod: CPTII,S$GLB,, | Performed by: OBSTETRICS & GYNECOLOGY

## 2022-07-06 NOTE — PROGRESS NOTES
36w0d without major complaints.   R/B/A of vaginal, operative, and  delivery as well as transfusion of blood products discussed today. All questions answered and patient voices understanding. Consents signed.   GBS, HIV, RPR, CBC collected.   Labor precautions discussed.   RTC in 2 weeks for routine PNC.

## 2022-07-07 LAB
HIV 1+2 AB+HIV1 P24 AG SERPL QL IA: NEGATIVE
RPR SER QL: NORMAL

## 2022-07-09 LAB — BACTERIA SPEC AEROBE CULT: NORMAL

## 2022-07-20 ENCOUNTER — ROUTINE PRENATAL (OUTPATIENT)
Dept: OBSTETRICS AND GYNECOLOGY | Facility: CLINIC | Age: 37
End: 2022-07-20
Payer: COMMERCIAL

## 2022-07-20 VITALS
WEIGHT: 157.06 LBS | SYSTOLIC BLOOD PRESSURE: 104 MMHG | DIASTOLIC BLOOD PRESSURE: 62 MMHG | BODY MASS INDEX: 26.96 KG/M2

## 2022-07-20 DIAGNOSIS — Z67.91 RH NEGATIVE STATE IN ANTEPARTUM PERIOD: ICD-10-CM

## 2022-07-20 DIAGNOSIS — Z3A.38 38 WEEKS GESTATION OF PREGNANCY: ICD-10-CM

## 2022-07-20 DIAGNOSIS — O26.899 RH NEGATIVE STATE IN ANTEPARTUM PERIOD: ICD-10-CM

## 2022-07-20 DIAGNOSIS — O09.523 MULTIGRAVIDA OF ADVANCED MATERNAL AGE IN THIRD TRIMESTER: Primary | ICD-10-CM

## 2022-07-20 DIAGNOSIS — O43.199 MARGINAL INSERTION OF UMBILICAL CORD AFFECTING MANAGEMENT OF MOTHER: ICD-10-CM

## 2022-07-20 PROCEDURE — 0502F SUBSEQUENT PRENATAL CARE: CPT | Mod: CPTII,S$GLB,, | Performed by: OBSTETRICS & GYNECOLOGY

## 2022-07-20 PROCEDURE — 99999 PR PBB SHADOW E&M-EST. PATIENT-LVL II: CPT | Mod: PBBFAC,,, | Performed by: OBSTETRICS & GYNECOLOGY

## 2022-07-20 PROCEDURE — 0502F PR SUBSEQUENT PRENATAL CARE: ICD-10-PCS | Mod: CPTII,S$GLB,, | Performed by: OBSTETRICS & GYNECOLOGY

## 2022-07-20 PROCEDURE — 99999 PR PBB SHADOW E&M-EST. PATIENT-LVL II: ICD-10-PCS | Mod: PBBFAC,,, | Performed by: OBSTETRICS & GYNECOLOGY

## 2022-07-20 NOTE — PROGRESS NOTES
38w0d without major complaints.   Labor precautions reviewed.   RTC in 1 week for routine PNC.   Cvx: 1/Th/-4/Soft/anterior

## 2022-07-27 ENCOUNTER — TELEPHONE (OUTPATIENT)
Dept: OBSTETRICS AND GYNECOLOGY | Facility: CLINIC | Age: 37
End: 2022-07-27

## 2022-07-27 ENCOUNTER — ROUTINE PRENATAL (OUTPATIENT)
Dept: OBSTETRICS AND GYNECOLOGY | Facility: CLINIC | Age: 37
End: 2022-07-27
Payer: COMMERCIAL

## 2022-07-27 VITALS — DIASTOLIC BLOOD PRESSURE: 70 MMHG | BODY MASS INDEX: 27.3 KG/M2 | WEIGHT: 159.06 LBS | SYSTOLIC BLOOD PRESSURE: 92 MMHG

## 2022-07-27 DIAGNOSIS — O09.523 MULTIGRAVIDA OF ADVANCED MATERNAL AGE IN THIRD TRIMESTER: Primary | ICD-10-CM

## 2022-07-27 DIAGNOSIS — O43.199 MARGINAL INSERTION OF UMBILICAL CORD AFFECTING MANAGEMENT OF MOTHER: ICD-10-CM

## 2022-07-27 DIAGNOSIS — Z3A.39 39 WEEKS GESTATION OF PREGNANCY: ICD-10-CM

## 2022-07-27 DIAGNOSIS — Z67.91 RH NEGATIVE STATE IN ANTEPARTUM PERIOD: ICD-10-CM

## 2022-07-27 DIAGNOSIS — O26.899 RH NEGATIVE STATE IN ANTEPARTUM PERIOD: ICD-10-CM

## 2022-07-27 PROCEDURE — 0502F SUBSEQUENT PRENATAL CARE: CPT | Mod: CPTII,S$GLB,, | Performed by: OBSTETRICS & GYNECOLOGY

## 2022-07-27 PROCEDURE — 0502F PR SUBSEQUENT PRENATAL CARE: ICD-10-PCS | Mod: CPTII,S$GLB,, | Performed by: OBSTETRICS & GYNECOLOGY

## 2022-07-27 PROCEDURE — 99999 PR PBB SHADOW E&M-EST. PATIENT-LVL II: CPT | Mod: PBBFAC,,, | Performed by: OBSTETRICS & GYNECOLOGY

## 2022-07-27 PROCEDURE — 99999 PR PBB SHADOW E&M-EST. PATIENT-LVL II: ICD-10-PCS | Mod: PBBFAC,,, | Performed by: OBSTETRICS & GYNECOLOGY

## 2022-07-27 NOTE — TELEPHONE ENCOUNTER
----- Message from Lyssa Martinez MD sent at 2022  4:24 PM CDT -----  Chelo Choi,   Can we please request an IOL for Nallely on 7/10 at 5 am?   She will be 41w0d. . She's AMA with a marginal cord insertion.  Thank you!

## 2022-07-27 NOTE — TELEPHONE ENCOUNTER
Induction requested for 8/10/2022 at 0500.    Pt vaccinated    Pt already scheduled for PP appt with IUD insertion

## 2022-08-02 ENCOUNTER — PATIENT MESSAGE (OUTPATIENT)
Dept: OPTOMETRY | Facility: CLINIC | Age: 37
End: 2022-08-02
Payer: COMMERCIAL

## 2022-08-03 ENCOUNTER — ROUTINE PRENATAL (OUTPATIENT)
Dept: OBSTETRICS AND GYNECOLOGY | Facility: CLINIC | Age: 37
End: 2022-08-03
Payer: COMMERCIAL

## 2022-08-03 VITALS
WEIGHT: 159.19 LBS | DIASTOLIC BLOOD PRESSURE: 82 MMHG | SYSTOLIC BLOOD PRESSURE: 112 MMHG | BODY MASS INDEX: 27.32 KG/M2

## 2022-08-03 DIAGNOSIS — Z67.91 RH NEGATIVE STATE IN ANTEPARTUM PERIOD: ICD-10-CM

## 2022-08-03 DIAGNOSIS — Z3A.40 40 WEEKS GESTATION OF PREGNANCY: ICD-10-CM

## 2022-08-03 DIAGNOSIS — O43.199 MARGINAL INSERTION OF UMBILICAL CORD AFFECTING MANAGEMENT OF MOTHER: ICD-10-CM

## 2022-08-03 DIAGNOSIS — O09.523 MULTIGRAVIDA OF ADVANCED MATERNAL AGE IN THIRD TRIMESTER: Primary | ICD-10-CM

## 2022-08-03 DIAGNOSIS — O26.899 RH NEGATIVE STATE IN ANTEPARTUM PERIOD: ICD-10-CM

## 2022-08-03 PROCEDURE — 0502F SUBSEQUENT PRENATAL CARE: CPT | Mod: CPTII,S$GLB,, | Performed by: OBSTETRICS & GYNECOLOGY

## 2022-08-03 PROCEDURE — 99999 PR PBB SHADOW E&M-EST. PATIENT-LVL II: CPT | Mod: PBBFAC,,, | Performed by: OBSTETRICS & GYNECOLOGY

## 2022-08-03 PROCEDURE — 0502F PR SUBSEQUENT PRENATAL CARE: ICD-10-PCS | Mod: CPTII,S$GLB,, | Performed by: OBSTETRICS & GYNECOLOGY

## 2022-08-03 PROCEDURE — 99999 PR PBB SHADOW E&M-EST. PATIENT-LVL II: ICD-10-PCS | Mod: PBBFAC,,, | Performed by: OBSTETRICS & GYNECOLOGY

## 2022-08-03 NOTE — PROGRESS NOTES
40w0d without major complaints.   Labor precautions reviewed.   IOL schedule for 41 wga.   IOL info sheet given to patient today.

## 2022-08-04 ENCOUNTER — ANESTHESIA (OUTPATIENT)
Dept: OBSTETRICS AND GYNECOLOGY | Facility: OTHER | Age: 37
End: 2022-08-04

## 2022-08-04 ENCOUNTER — HOSPITAL ENCOUNTER (INPATIENT)
Facility: OTHER | Age: 37
LOS: 2 days | Discharge: HOME | End: 2022-08-06
Attending: OBSTETRICS & GYNECOLOGY | Admitting: OBSTETRICS & GYNECOLOGY
Payer: COMMERCIAL

## 2022-08-04 ENCOUNTER — ANESTHESIA EVENT (OUTPATIENT)
Dept: OBSTETRICS AND GYNECOLOGY | Facility: OTHER | Age: 37
End: 2022-08-04

## 2022-08-04 DIAGNOSIS — Z3A.40 40 WEEKS GESTATION OF PREGNANCY: ICD-10-CM

## 2022-08-04 DIAGNOSIS — Z37.9 NORMAL LABOR: Primary | ICD-10-CM

## 2022-08-04 PROBLEM — O09.523 MULTIGRAVIDA OF ADVANCED MATERNAL AGE IN THIRD TRIMESTER: Status: ACTIVE | Noted: 2022-08-04

## 2022-08-04 PROBLEM — O09.523 MULTIGRAVIDA OF ADVANCED MATERNAL AGE IN THIRD TRIMESTER: Status: RESOLVED | Noted: 2022-08-04 | Resolved: 2022-08-04

## 2022-08-04 LAB
ABO + RH BLD: NORMAL
BASOPHILS # BLD AUTO: 0.04 K/UL (ref 0–0.2)
BASOPHILS NFR BLD: 0.3 % (ref 0–1.9)
BLD GP AB SCN CELLS X3 SERPL QL: NORMAL
DIFFERENTIAL METHOD: ABNORMAL
EOSINOPHIL # BLD AUTO: 0.1 K/UL (ref 0–0.5)
EOSINOPHIL NFR BLD: 0.5 % (ref 0–8)
ERYTHROCYTE [DISTWIDTH] IN BLOOD BY AUTOMATED COUNT: 14 % (ref 11.5–14.5)
HCT VFR BLD AUTO: 36.4 % (ref 37–48.5)
HGB BLD-MCNC: 12.2 G/DL (ref 12–16)
IMM GRANULOCYTES # BLD AUTO: 0.04 K/UL (ref 0–0.04)
IMM GRANULOCYTES NFR BLD AUTO: 0.3 % (ref 0–0.5)
LYMPHOCYTES # BLD AUTO: 1.6 K/UL (ref 1–4.8)
LYMPHOCYTES NFR BLD: 11.6 % (ref 18–48)
MCH RBC QN AUTO: 27.6 PG (ref 27–31)
MCHC RBC AUTO-ENTMCNC: 33.5 G/DL (ref 32–36)
MCV RBC AUTO: 82 FL (ref 82–98)
MONOCYTES # BLD AUTO: 0.6 K/UL (ref 0.3–1)
MONOCYTES NFR BLD: 4.2 % (ref 4–15)
NEUTROPHILS # BLD AUTO: 11.4 K/UL (ref 1.8–7.7)
NEUTROPHILS NFR BLD: 83.1 % (ref 38–73)
NRBC BLD-RTO: 0 /100 WBC
PLATELET # BLD AUTO: 199 K/UL (ref 150–450)
PMV BLD AUTO: 12 FL (ref 9.2–12.9)
RBC # BLD AUTO: 4.42 M/UL (ref 4–5.4)
WBC # BLD AUTO: 13.71 K/UL (ref 3.9–12.7)

## 2022-08-04 PROCEDURE — 63600175 PHARM REV CODE 636 W HCPCS

## 2022-08-04 PROCEDURE — 59400 PR FULL ROUT OBSTE CARE,VAGINAL DELIV: ICD-10-PCS | Mod: GB,,, | Performed by: OBSTETRICS & GYNECOLOGY

## 2022-08-04 PROCEDURE — 59400 OBSTETRICAL CARE: CPT | Mod: GB,,, | Performed by: OBSTETRICS & GYNECOLOGY

## 2022-08-04 PROCEDURE — 59025 FETAL NON-STRESS TEST: CPT

## 2022-08-04 PROCEDURE — 99284 EMERGENCY DEPT VISIT MOD MDM: CPT | Mod: 25,,, | Performed by: OBSTETRICS & GYNECOLOGY

## 2022-08-04 PROCEDURE — 99285 EMERGENCY DEPT VISIT HI MDM: CPT | Mod: 25

## 2022-08-04 PROCEDURE — 72200005 HC VAGINAL DELIVERY LEVEL II

## 2022-08-04 PROCEDURE — 72100002 HC LABOR CARE, 1ST 8 HOURS

## 2022-08-04 PROCEDURE — 86850 RBC ANTIBODY SCREEN: CPT

## 2022-08-04 PROCEDURE — 85025 COMPLETE CBC W/AUTO DIFF WBC: CPT

## 2022-08-04 PROCEDURE — 72100003 HC LABOR CARE, EA. ADDL. 8 HRS

## 2022-08-04 PROCEDURE — 0502F SUBSEQUENT PRENATAL CARE: CPT | Mod: ,,, | Performed by: OBSTETRICS & GYNECOLOGY

## 2022-08-04 PROCEDURE — 11000001 HC ACUTE MED/SURG PRIVATE ROOM

## 2022-08-04 PROCEDURE — 59025 FETAL NON-STRESS TEST: CPT | Mod: 26,,, | Performed by: OBSTETRICS & GYNECOLOGY

## 2022-08-04 PROCEDURE — 59025 PR FETAL 2N-STRESS TEST: ICD-10-PCS | Mod: 26,,, | Performed by: OBSTETRICS & GYNECOLOGY

## 2022-08-04 PROCEDURE — 99284 PR EMERGENCY DEPT VISIT,LEVEL IV: ICD-10-PCS | Mod: 25,,, | Performed by: OBSTETRICS & GYNECOLOGY

## 2022-08-04 PROCEDURE — 0502F PR SUBSEQUENT PRENATAL CARE: ICD-10-PCS | Mod: ,,, | Performed by: OBSTETRICS & GYNECOLOGY

## 2022-08-04 RX ORDER — PROCHLORPERAZINE EDISYLATE 5 MG/ML
5 INJECTION INTRAMUSCULAR; INTRAVENOUS EVERY 6 HOURS PRN
Status: DISCONTINUED | OUTPATIENT
Start: 2022-08-04 | End: 2022-08-06 | Stop reason: HOSPADM

## 2022-08-04 RX ORDER — HYDROCODONE BITARTRATE AND ACETAMINOPHEN 10; 325 MG/1; MG/1
1 TABLET ORAL EVERY 4 HOURS PRN
Status: DISCONTINUED | OUTPATIENT
Start: 2022-08-04 | End: 2022-08-06 | Stop reason: HOSPADM

## 2022-08-04 RX ORDER — HYDROCORTISONE 25 MG/G
CREAM TOPICAL 3 TIMES DAILY PRN
Status: DISCONTINUED | OUTPATIENT
Start: 2022-08-04 | End: 2022-08-06 | Stop reason: HOSPADM

## 2022-08-04 RX ORDER — SODIUM CHLORIDE 0.9 % (FLUSH) 0.9 %
10 SYRINGE (ML) INJECTION
Status: CANCELLED | OUTPATIENT
Start: 2022-08-04

## 2022-08-04 RX ORDER — DIPHENHYDRAMINE HCL 25 MG
25 CAPSULE ORAL EVERY 4 HOURS PRN
Status: DISCONTINUED | OUTPATIENT
Start: 2022-08-04 | End: 2022-08-06 | Stop reason: HOSPADM

## 2022-08-04 RX ORDER — DIPHENOXYLATE HYDROCHLORIDE AND ATROPINE SULFATE 2.5; .025 MG/1; MG/1
1 TABLET ORAL 4 TIMES DAILY PRN
Status: DISCONTINUED | OUTPATIENT
Start: 2022-08-04 | End: 2022-08-06 | Stop reason: HOSPADM

## 2022-08-04 RX ORDER — ONDANSETRON 8 MG/1
8 TABLET, ORALLY DISINTEGRATING ORAL EVERY 8 HOURS PRN
Status: DISCONTINUED | OUTPATIENT
Start: 2022-08-04 | End: 2022-08-06 | Stop reason: HOSPADM

## 2022-08-04 RX ORDER — ONDANSETRON 8 MG/1
8 TABLET, ORALLY DISINTEGRATING ORAL EVERY 8 HOURS PRN
Status: CANCELLED | OUTPATIENT
Start: 2022-08-04

## 2022-08-04 RX ORDER — ACETAMINOPHEN 325 MG/1
650 TABLET ORAL EVERY 6 HOURS PRN
Status: DISCONTINUED | OUTPATIENT
Start: 2022-08-04 | End: 2022-08-06 | Stop reason: HOSPADM

## 2022-08-04 RX ORDER — MISOPROSTOL 200 UG/1
800 TABLET ORAL
Status: DISCONTINUED | OUTPATIENT
Start: 2022-08-04 | End: 2022-08-06 | Stop reason: HOSPADM

## 2022-08-04 RX ORDER — OXYTOCIN/RINGER'S LACTATE 30/500 ML
334 PLASTIC BAG, INJECTION (ML) INTRAVENOUS ONCE
Status: DISCONTINUED | OUTPATIENT
Start: 2022-08-04 | End: 2022-08-06 | Stop reason: HOSPADM

## 2022-08-04 RX ORDER — IBUPROFEN 600 MG/1
600 TABLET ORAL EVERY 6 HOURS
Status: DISCONTINUED | OUTPATIENT
Start: 2022-08-05 | End: 2022-08-06 | Stop reason: HOSPADM

## 2022-08-04 RX ORDER — LIDOCAINE HYDROCHLORIDE 10 MG/ML
INJECTION INFILTRATION; PERINEURAL
Status: DISPENSED
Start: 2022-08-04 | End: 2022-08-05

## 2022-08-04 RX ORDER — DIPHENHYDRAMINE HYDROCHLORIDE 50 MG/ML
25 INJECTION INTRAMUSCULAR; INTRAVENOUS EVERY 4 HOURS PRN
Status: DISCONTINUED | OUTPATIENT
Start: 2022-08-04 | End: 2022-08-06 | Stop reason: HOSPADM

## 2022-08-04 RX ORDER — SODIUM CHLORIDE 9 MG/ML
INJECTION, SOLUTION INTRAVENOUS
Status: DISCONTINUED | OUTPATIENT
Start: 2022-08-04 | End: 2022-08-06 | Stop reason: HOSPADM

## 2022-08-04 RX ORDER — SODIUM CHLORIDE, SODIUM LACTATE, POTASSIUM CHLORIDE, CALCIUM CHLORIDE 600; 310; 30; 20 MG/100ML; MG/100ML; MG/100ML; MG/100ML
INJECTION, SOLUTION INTRAVENOUS CONTINUOUS
Status: DISCONTINUED | OUTPATIENT
Start: 2022-08-04 | End: 2022-08-06 | Stop reason: HOSPADM

## 2022-08-04 RX ORDER — CARBOPROST TROMETHAMINE 250 UG/ML
250 INJECTION, SOLUTION INTRAMUSCULAR
Status: DISCONTINUED | OUTPATIENT
Start: 2022-08-04 | End: 2022-08-06 | Stop reason: HOSPADM

## 2022-08-04 RX ORDER — LIDOCAINE HYDROCHLORIDE 10 MG/ML
10 INJECTION INFILTRATION; PERINEURAL ONCE AS NEEDED
Status: DISCONTINUED | OUTPATIENT
Start: 2022-08-04 | End: 2022-08-06 | Stop reason: HOSPADM

## 2022-08-04 RX ORDER — OXYTOCIN/RINGER'S LACTATE 30/500 ML
0-30 PLASTIC BAG, INJECTION (ML) INTRAVENOUS CONTINUOUS
Status: DISCONTINUED | OUTPATIENT
Start: 2022-08-04 | End: 2022-08-06 | Stop reason: HOSPADM

## 2022-08-04 RX ORDER — OXYTOCIN/RINGER'S LACTATE 30/500 ML
95 PLASTIC BAG, INJECTION (ML) INTRAVENOUS ONCE
Status: DISCONTINUED | OUTPATIENT
Start: 2022-08-04 | End: 2022-08-06 | Stop reason: HOSPADM

## 2022-08-04 RX ORDER — DOCUSATE SODIUM 100 MG/1
200 CAPSULE, LIQUID FILLED ORAL 2 TIMES DAILY PRN
Status: DISCONTINUED | OUTPATIENT
Start: 2022-08-04 | End: 2022-08-06 | Stop reason: HOSPADM

## 2022-08-04 RX ORDER — PROCHLORPERAZINE EDISYLATE 5 MG/ML
5 INJECTION INTRAMUSCULAR; INTRAVENOUS EVERY 6 HOURS PRN
Status: CANCELLED | OUTPATIENT
Start: 2022-08-04

## 2022-08-04 RX ORDER — METHYLERGONOVINE MALEATE 0.2 MG/ML
200 INJECTION INTRAVENOUS
Status: DISCONTINUED | OUTPATIENT
Start: 2022-08-04 | End: 2022-08-06 | Stop reason: HOSPADM

## 2022-08-04 RX ORDER — HYDROCODONE BITARTRATE AND ACETAMINOPHEN 5; 325 MG/1; MG/1
1 TABLET ORAL EVERY 4 HOURS PRN
Status: DISCONTINUED | OUTPATIENT
Start: 2022-08-04 | End: 2022-08-06 | Stop reason: HOSPADM

## 2022-08-04 RX ORDER — TRANEXAMIC ACID 100 MG/ML
1000 INJECTION, SOLUTION INTRAVENOUS ONCE AS NEEDED
Status: DISCONTINUED | OUTPATIENT
Start: 2022-08-04 | End: 2022-08-06 | Stop reason: HOSPADM

## 2022-08-04 RX ORDER — PRENATAL WITH FERROUS FUM AND FOLIC ACID 3080; 920; 120; 400; 22; 1.84; 3; 20; 10; 1; 12; 200; 27; 25; 2 [IU]/1; [IU]/1; MG/1; [IU]/1; MG/1; MG/1; MG/1; MG/1; MG/1; MG/1; UG/1; MG/1; MG/1; MG/1; MG/1
1 TABLET ORAL DAILY
Status: DISCONTINUED | OUTPATIENT
Start: 2022-08-05 | End: 2022-08-06 | Stop reason: HOSPADM

## 2022-08-04 RX ORDER — SIMETHICONE 80 MG
1 TABLET,CHEWABLE ORAL EVERY 6 HOURS PRN
Status: DISCONTINUED | OUTPATIENT
Start: 2022-08-04 | End: 2022-08-06 | Stop reason: HOSPADM

## 2022-08-04 RX ORDER — SIMETHICONE 80 MG
1 TABLET,CHEWABLE ORAL 4 TIMES DAILY PRN
Status: DISCONTINUED | OUTPATIENT
Start: 2022-08-04 | End: 2022-08-04

## 2022-08-04 RX ORDER — CALCIUM CARBONATE 200(500)MG
500 TABLET,CHEWABLE ORAL 3 TIMES DAILY PRN
Status: DISCONTINUED | OUTPATIENT
Start: 2022-08-04 | End: 2022-08-06 | Stop reason: HOSPADM

## 2022-08-04 RX ADMIN — Medication 4 MILLI-UNITS/MIN: at 02:08

## 2022-08-04 RX ADMIN — IBUPROFEN 600 MG: 600 TABLET ORAL at 10:08

## 2022-08-04 RX ADMIN — SODIUM CHLORIDE, SODIUM LACTATE, POTASSIUM CHLORIDE, AND CALCIUM CHLORIDE: .6; .31; .03; .02 INJECTION, SOLUTION INTRAVENOUS at 02:08

## 2022-08-04 RX ADMIN — SODIUM CHLORIDE, SODIUM LACTATE, POTASSIUM CHLORIDE, AND CALCIUM CHLORIDE: .6; .31; .03; .02 INJECTION, SOLUTION INTRAVENOUS at 05:08

## 2022-08-04 NOTE — H&P
HISTORY AND PHYSICAL                                                OBSTETRICS          Subjective:       Nallely Liang is a 37 y.o.  female with IUP at 40w1d  weeks gestation who c/o contractions. Contractions have been occuring Q4 min and have increased in intensity.    This IUP is complicated by Rh neg and AMA. Patient reports contractions, denies vaginal bleeding, denies LOF.   Fetal Movement: normal.     PMHx: No past medical history on file.    PSHx:   Past Surgical History:   Procedure Laterality Date    ANTERIOR CRUCIATE LIGAMENT REPAIR Left 2009    VAGINAL DELIVERY  2020       All: Review of patient's allergies indicates:  No Known Allergies    Meds: (Not in a hospital admission)      SH:   Social History     Socioeconomic History    Marital status:     Number of children: 0   Occupational History    Occupation: nutrition research     Comment: [employer]   Tobacco Use    Smoking status: Never Smoker    Smokeless tobacco: Never Used   Substance and Sexual Activity    Alcohol use: Yes     Alcohol/week: 4.0 standard drinks     Types: 4 Standard drinks or equivalent per week     Comment: occasional     Drug use: No    Sexual activity: Yes     Partners: Male     Birth control/protection: None   Social History Narrative    The patient does exercise regularly (BIW-TIW: dance classes, yoga).      She is satisfied with weight.    Rates diet as good.    She does not drink at least 1/2 gallon water daily.    She drinks 2 coffee/tea/caffeine-containing soft drinks daily.    Total sleep time at night is 7-9 hours.    She works 40-45 hours per week.    She does wear seat belts.    Hobbies include sialing.       FH:   Family History   Problem Relation Age of Onset    Miscarriages / Stillbirths Maternal Grandmother     Stroke Maternal Grandfather     Heart attack Maternal Grandfather     No Known Problems Father     No Known Problems Mother     No Known  Problems Brother     No Known Problems Paternal Grandmother     Heart attack Paternal Grandfather     No Known Problems Brother     No Known Problems Brother     Breast cancer Neg Hx        OBHx:   OB History    Para Term  AB Living   3 2 2 0 0 2   SAB IAB Ectopic Multiple Live Births   0 0 0 0 2      # Outcome Date GA Lbr Christophe/2nd Weight Sex Delivery Anes PTL Lv   3 Current            2 Term 10/21/20 40w5d  3.81 kg (8 lb 6.4 oz) M Vag-Spont None N MICHAELA      Name: Aneesh       Apgar1: 9  Apgar5: 9   1 Term 18 41w5d  3.63 kg (8 lb) M Vag-Vacuum EPI N MICHAELA      Name: Lonny       Apgar1: 8  Apgar5: 9      Obstetric Comments   Menarche age 14.    Menses normal and regular.   History of abnormal PAP smear: NO.   History of sexually transmitted disease:  NO       Objective:       /82   Temp 98 °F (36.7 °C) (Oral)   LMP 10/24/2021 (Exact Date)     Vitals:    22 0200   BP: 122/82   Temp: 98 °F (36.7 °C)   TempSrc: Oral       General:   alert, appears stated age and cooperative   Lungs:   non-labored breathing    Heart:   regular rate    Abdomen:  soft, non-tender; bowel sounds normal; no masses,  no organomegaly   Extremities negative edema, negative erythema   FHT: 125 bpm, mod variability, + accels, - decels; Cat 1 (reassuring)                 TOCO: Q 4 minutes   Presentations: cephalic by ultrasound   Cervix:     Dilation: 5 cm    Effacement: 80%    Station:  -2    Consistency: soft    Position: anterior   EFW by Leopold's: 7.5 lbs     Recent Growth Ultrasound: 2375g 30th%, AC 33rd% at 34w0d ()     Lab Review  Blood Type O NEG  GBBS: negative  Rubella: Immune  RPR: NR  HIV: negative  HepB: negative       Assessment:       40w1d weeks gestation, latent labor.     Active Hospital Problems    Diagnosis  POA    Normal labor [O80, Z37.9]  Not Applicable    Multigravida of advanced maternal age in third trimester [O09.523]  Unknown    Rh negative, antepartum [O26.899, Z67.91]  Yes       Resolved Hospital Problems   No resolved problems to display.          Plan:     1. Latent Labor:   - Risks, benefits, alternatives and possible complications have been discussed in detail with the patient.   - Consents reviewed and in chart   - Admit to Labor and Delivery unit  - Epidural per Anesthesia  - Draw CBC, T&S  - Notify Staff  - Recheck in 4 hrs or PRN    2. Rh Neg:   - Received rhogam at 28 weeks   - Will need postpartum rhogam work-up     3. AMA:   - Mat 21 negative     Post-Partum Hemorrhage risk - low    Caitlin Lewis MD/MPH  OB/GYN PGY-2  [facility]

## 2022-08-04 NOTE — L&D DELIVERY NOTE
"[facility] - Labor & Delivery  Vaginal Delivery   Operative Note    SUMMARY     Normal spontaneous vaginal delivery of live infant, was placed on mothers abdomen for skin to skin and bulb suctioning performed.  Infant delivered position FRANK over intact perineum.  Nuchal cord: No.    Spontaneous delivery of placenta and IV pitocin given noting good uterine tone.  1st degree laceration noted.  Patient tolerated delivery well. Sponge needle and lap counted correctly x2.    Indications:  (spontaneous vaginal delivery)  Pregnancy complicated by:   Patient Active Problem List   Diagnosis    Rh negative, antepartum     (spontaneous vaginal delivery)     Admitting GA: 40w1d    Delivery Information for Cameron Liang    Birth information:  YOB: 2021   Time of birth: 3:09 PM   Sex: male   Head Delivery Date/Time: 2022  3:09 PM   Delivery type: Vaginal, Spontaneous   Gestational Age: 40w1d    Delivery Providers    Delivering clinician: Danielle Colón MD   Provider Role    MD Shital Wells MD Nicole R. Koppi, GISELLE Clay RN             Measurements    Weight: 3810 g  Weight (lbs): 8 lb 6.4 oz  Length: 53.3 cm  Length (in): 21"  Head circumference: 34 cm  Chest circumference: 36 cm         Apgars    Living status: Living  Apgars:  1 min.:  5 min.:  10 min.:  15 min.:  20 min.:    Skin color:  1  1       Heart rate:  2  2       Reflex irritability:  2  2       Muscle tone:  2  2       Respiratory effort:  2  2       Total:  9  9       Apgars assigned by: LUCY PANCHAL RN         Operative Delivery    Forceps attempted?: No  Vacuum extractor attempted?: No         Shoulder Dystocia    Shoulder dystocia present?: No           Presentation    Presentation: Vertex  Position: Left Occiput Anterior           Interventions/Resuscitation    Method: Bulb Suctioning, Tactile Stimulation       Cord    Vessels: 3 vessels  Complications: None  Delayed Cord Clamping?: Yes  Cord " Clamped Date/Time: 2022  3:11 PM  Cord Blood Disposition: Sent with Baby  Gases Sent?: No  Stem Cell Collection (by MD): No       Placenta    Placenta delivery date/time: 2022 1515  Placenta removal: Spontaneous  Placenta appearance: Intact  Placenta disposition: discarded           Labor Events:       labor: No     Labor Onset Date/Time: 2022 06:15     Dilation Complete Date/Time: 2022 14:55     Start Pushing Date/Time: 2022 15:03       Start Pushing Date/Time: 2022 15:03     Rupture Date/Time: 22  1220         Rupture type:          Fluid Amount:       Fluid Color: Clear      Fluid Odor:       Membrane Status: ARM (Artificial Rupture)               steroids:       Antibiotics given for GBS: No     Induction:       Indications for induction:        Augmentation: amniotomy;oxytocin     Indications for augmentation: Ineffective Contraction Pattern     Labor complications: None     Additional complications:          Cervical ripening:                     Delivery:      Episiotomy: None     Indication for Episiotomy:       Perineal Lacerations: 1st Repaired:  Yes   Periurethral Laceration:   Repaired:     Labial Laceration:   Repaired:     Sulcus Laceration:   Repaired:     Vaginal Laceration:   Repaired:     Cervical Laceration:   Repaired:     Repair suture:       Repair # of packets: 1     Last Value - EBL - Nursing (mL):       Sum - EBL - Nursing (mL): 0     Last Value - EBL - Anesthesia (mL):      Calculated QBL (mL): 200      Vaginal Sweep Performed: Yes     Surgicount Correct: Yes       Other providers:       Anesthesia    Method: Local          Details (if applicable):  Trial of Labor      Categorization:      Priority:     Indications for :     Incision Type:       Additional  information:  Forceps:    Vacuum:    Breech:    Observed anomalies    Other (Comments):           Shital Villeda MD  [facility]    CHERYL PGY1

## 2022-08-04 NOTE — PROGRESS NOTES
LABOR NOTE    S:  Complaints: No.  Epidural working:  not applicable      O: /69   Pulse 102   Temp 98.4 °F (36.9 °C) (Oral)   LMP 10/24/2021 (Exact Date)   SpO2 98%   Breastfeeding No     FHT: 125, moderate BTBV, + accels, no decels Cat 1 (reassuring)  CTX: q 2-3 minutes  SVE: 8/90/0, AROM clear    Labor Course:  0215: 5/80/-2  0615: 6/80/-2  0830: 8/90/-1  1030: 8/90/0, declines AROM  1225: 8/90/0,  AROM clear  0200: 8/90/0, unchanged from last check will start pit    PLAN:  Continue Close Maternal/Fetal Monitoring  Pitocin augmentation per protocol  Recheck 2 hours or PRN    Pao Kim MD  OB-GYN, PGY-1

## 2022-08-04 NOTE — PROGRESS NOTES
I have assumed care of this patient since 7am. I have checked patient at 830am and she was 8/90/-1.  AROM was recommended but she declined as she does not want intervention.  She declines pitocin as well.  Discussed at length risks/benefit and she may consider at next check.  Cat 1 tracing. Irregular contractions and overall comfortable in appearance. Discussed role as hospitalist and that will be present for delivery as her ob is out of town.  She agrees and no further questions.

## 2022-08-04 NOTE — ANESTHESIA PREPROCEDURE EVALUATION
Nallely Liang is a 37 y.o. female  with IUP at 40w1d  weeks gestation who c/o contractions.     This IUP is complicated by Rh neg and AMA.     OB History    Para Term  AB Living   3 2 2 0 0 2   SAB IAB Ectopic Multiple Live Births   0 0 0 0 2      # Outcome Date GA Lbr Christophe/2nd Weight Sex Delivery Anes PTL Lv   3 Current            2 Term 10/21/20 40w5d  3.81 kg (8 lb 6.4 oz) M Vag-Spont None N MICHAELA   1 Term 18 41w5d  3.63 kg (8 lb) M Vag-Vacuum EPI N MICHAELA      Obstetric Comments   Menarche age 14.    Menses normal and regular.   History of abnormal PAP smear: NO.   History of sexually transmitted disease:  NO       Wt Readings from Last 1 Encounters:   22 1129 72.2 kg (159 lb 2.8 oz)       BP Readings from Last 3 Encounters:   22 122/82   22 112/82   22 92/70       Patient Active Problem List   Diagnosis    Rh negative, antepartum    Normal labor    Multigravida of advanced maternal age in third trimester       Past Surgical History:   Procedure Laterality Date    ANTERIOR CRUCIATE LIGAMENT REPAIR Left 2009    VAGINAL DELIVERY  2020       Social History     Socioeconomic History    Marital status:     Number of children: 0   Occupational History    Occupation: nutrition research     Comment: [employer]   Tobacco Use    Smoking status: Never Smoker    Smokeless tobacco: Never Used   Substance and Sexual Activity    Alcohol use: Yes     Alcohol/week: 4.0 standard drinks     Types: 4 Standard drinks or equivalent per week     Comment: occasional     Drug use: No    Sexual activity: Yes     Partners: Male     Birth control/protection: None   Social History Narrative    The patient does exercise regularly (BIW-TIW: dance classes, yoga).      She is satisfied with weight.    Rates diet as good.    She does not drink at least 1/2 gallon water daily.    She drinks 2 coffee/tea/caffeine-containing soft drinks daily.     Total sleep time at night is 7-9 hours.    She works 40-45 hours per week.    She does wear seat belts.    Hobbies include sialing.         Chemistry        Component Value Date/Time     05/31/2017 0950    K 3.9 05/31/2017 0950     05/31/2017 0950    CO2 23 05/31/2017 0950    BUN 8 05/31/2017 0950    CREATININE 0.8 05/31/2017 0950    GLU 77 05/31/2017 0950        Component Value Date/Time    CALCIUM 9.5 05/31/2017 0950    ALKPHOS 37 (L) 05/31/2017 0950    AST 17 05/31/2017 0950    ALT 12 05/31/2017 0950    BILITOT 0.3 05/31/2017 0950    ESTGFRAFRICA >60.0 05/31/2017 0950    EGFRNONAA >60.0 05/31/2017 0950            Lab Results   Component Value Date    WBC 13.71 (H) 08/04/2022    HGB 12.2 08/04/2022    HCT 36.4 (L) 08/04/2022    MCV 82 08/04/2022     08/04/2022       No results for input(s): PT, INR, PROTIME, APTT in the last 72 hours.                Pre-op Assessment    I have reviewed the Patient Summary Reports.     I have reviewed the Nursing Notes.    I have reviewed the Medications.     Review of Systems  Anesthesia Hx:  No problems with previous Anesthesia Denies Hx of Anesthetic complications  History of prior surgery of interest to airway management or planning: Denies Family Hx of Anesthesia complications.   Denies Personal Hx of Anesthesia complications.   Hematology/Oncology:     Oncology Normal     Cardiovascular:   Denies Hypertension.   Denies Angina. ECG has been reviewed.    Pulmonary:   Denies Shortness of breath.  Denies Recent URI.    Renal/:  Renal/ Normal     Hepatic/GI:   Denies GERD. Denies Liver Disease.    Neurological:   Denies CVA. Denies Seizures.    Endocrine:  Endocrine Normal Denies Diabetes.        Physical Exam  General: Well nourished, Cooperative and Alert    Airway:  Mallampati: II   Mouth Opening: Normal  TM Distance: Normal  Tongue: Normal  Neck ROM: Normal ROM    Dental:  Intact        Anesthesia Plan  Type of Anesthesia, risks & benefits  discussed:    Anesthesia Type: Gen ETT, Epidural, Spinal, CSE  Intra-op Monitoring Plan: Standard ASA Monitors  Post Op Pain Control Plan: multimodal analgesia  Induction:  IV  Airway Plan: Direct, Post-Induction  Informed Consent: Informed consent signed with the Patient and all parties understand the risks and agree with anesthesia plan.  All questions answered.   ASA Score: 2  Day of Surgery Review of History & Physical: H&P Update referred to the surgeon/provider.    Ready For Surgery From Anesthesia Perspective.     .

## 2022-08-04 NOTE — PROGRESS NOTES
LABOR NOTE    S:  Complaints: No.  Epidural working:  not applicable      O: /69   Pulse 102   Temp 98.4 °F (36.9 °C) (Oral)   LMP 10/24/2021 (Exact Date)   SpO2 98%   Breastfeeding No     FHT: 125, moderate BTBV, + accels, no decels Cat 1 (reassuring)  CTX: q 2-3 minutes  SVE: 8/90/0, AROM clear    Labor Course:  0215: 5/80/-2  0615: 6/80/-2  0830: 8/90/-1  1030: 8/90/0, declines AROM  1225: 8/90/0,  AROM clear    PLAN:  Continue Close Maternal/Fetal Monitoring  Recheck 2 hours or PRN    Queenie Goel MD  PGY-2 OB/GYN

## 2022-08-04 NOTE — ED PROVIDER NOTES
Encounter Date: 2022       History     Chief Complaint   Patient presents with    Contractions     HPI   Nallely Liang is a 37 y.o. [id number] at 40w1d presents complaining of contractions that started at 10 PM and are 3-4 minutes apart. Reports contractions have increased in intensity and frequency.     This IUP is complicated by Rh neg and AMA.  Patient reports contractions, denies vaginal bleeding, denies LOF.   Fetal Movement: normal.   Review of patient's allergies indicates:  No Known Allergies  No past medical history on file.  Past Surgical History:   Procedure Laterality Date    ANTERIOR CRUCIATE LIGAMENT REPAIR Left 2009    VAGINAL DELIVERY  ,      Family History   Problem Relation Age of Onset    Miscarriages / Stillbirths Maternal Grandmother     Stroke Maternal Grandfather     Heart attack Maternal Grandfather     No Known Problems Father     No Known Problems Mother     No Known Problems Brother     No Known Problems Paternal Grandmother     Heart attack Paternal Grandfather     No Known Problems Brother     No Known Problems Brother     Breast cancer Neg Hx      Social History     Tobacco Use    Smoking status: Never Smoker    Smokeless tobacco: Never Used   Substance Use Topics    Alcohol use: Yes     Alcohol/week: 4.0 standard drinks     Types: 4 Standard drinks or equivalent per week     Comment: occasional     Drug use: No     Review of Systems   Constitutional: Negative for chills and fever.   Eyes: Negative for photophobia and visual disturbance.   Respiratory: Negative for cough and shortness of breath.    Cardiovascular: Negative for chest pain, palpitations and leg swelling.   Gastrointestinal: Negative for abdominal pain, nausea and vomiting.   Genitourinary: Negative for difficulty urinating, dysuria, flank pain, frequency, hematuria, pelvic pain, urgency, vaginal bleeding, vaginal discharge and vaginal pain.   Neurological: Negative for headaches.        Physical Exam     Initial Vitals   BP Pulse Resp Temp SpO2   08/04/22 0200 08/04/22 0300 08/04/22 1805 08/04/22 0200 08/04/22 0304   122/82 87 16 98 °F (36.7 °C) 98 %      MAP       --                Physical Exam    Vitals reviewed.  Constitutional: She appears well-developed and well-nourished. She is not diaphoretic. No distress.   HENT:   Head: Normocephalic and atraumatic.   Eyes: Conjunctivae are normal. Pupils are equal, round, and reactive to light.   Neck:   Normal range of motion.  Cardiovascular: Normal rate.   Pulmonary/Chest: Effort normal. No respiratory distress.   Abdominal: Abdomen is soft. There is no abdominal tenderness.   No right CVA tenderness.  No left CVA tenderness. There is no rebound and no guarding.   Musculoskeletal:         General: Normal range of motion.      Cervical back: Normal range of motion.     Neurological: She is alert and oriented to person, place, and time.   Skin: Skin is warm and dry. Capillary refill takes less than 2 seconds.   Psychiatric: She has a normal mood and affect. Her behavior is normal. Judgment and thought content normal.     OB LABOR EXAM:   Pre-Term Labor: No.   Membranes ruptured: No.   Method: Sterile vaginal exam per MD.   Vaginal Bleeding: none present.     Dilatation: 5.   Station: -2.   Effacement: 80%.   Amniotic Fluid Color: no fluid.   Amniotic Fluid Amount: none noted.         ED Course   Obtain Fetal nonstress test (NST)    Date/Time: 8/4/2022 2:16 AM  Performed by: Caitlin Lewis MD  Authorized by: Caitlin Lewis MD     Nonstress Test:     Variability:  6-25 BPM    Decelerations:  None    Accelerations:  15 bpm    Baseline:  120    Uterine Irritability: No      Contractions:  Regular    Contraction Frequency:  5  Biophysical Profile:     Nonstress Test Interpretation: reactive      Overall Impression:  Reassuring  Post-procedure:     Patient tolerance:  Patient tolerated the procedure well with no immediate complications      Labs  Reviewed   CBC W/ AUTO DIFFERENTIAL - Abnormal; Notable for the following components:       Result Value    WBC 13.71 (*)     Hematocrit 36.4 (*)     Gran # (ANC) 11.4 (*)     Gran % 83.1 (*)     Lymph % 11.6 (*)     All other components within normal limits          Imaging Results    None          Medications   LIDOcaine HCL 10 mg/ml (1%) 10 mg/mL (1 %) injection (has no administration in time range)   rho(D) immune globulin injection 300 mcg (300 mcg Intramuscular Given by Other 8/5/22 2154)     Medical Decision Making:   ED Management:  1. Contractions:   - VSS   - NST RR; TOCO contractions q 5 minutes > contractions now 3-4 minutes apart   - SVE: 5/80/-2 > recheck in 1 hr > 5/80/-2   - Patient lives greater than 1 hour away, will admit and expectantly manage labor   - Discussed with OB ED staff, patient stable for transfer to L&D     Other:   I discussed test(s) with the performing physician.            Attending Attestation:   Physician Attestation Statement for Resident:  As the supervising MD   Physician Attestation Statement: I have personally seen and examined this patient.   I agree with the above history. -:   As the supervising MD I agree with the above PE.    As the supervising MD I agree with the above treatment, course, plan, and disposition.   -: Patient evaluated and found to be stable, agree with resident's assessment and plan.  NST reactive and reassuring, toco with contractions every 5 minutes.  Exam c/w labor.  Will admit to L&D.  I was personally present during the critical portions of the procedure(s) performed by the resident and was immediately available in the ED to provide services and assistance as needed during the entire procedure.  I have reviewed the following: old records at this facility.                         Clinical Impression:   Final diagnoses:  [O80, Z37.9] Normal labor (Primary)  [Z3A.40] 40 weeks gestation of pregnancy          ED Disposition Condition    Send to L&D                Caitlin Lewis MD  Resident  08/04/22 0704       Narcisa Hanna MD  08/08/22 5821

## 2022-08-04 NOTE — PROGRESS NOTES
LABOR NOTE    S:  Complaints: No.  Epidural working:  not applicable  MD to bedside for cervical check    O: /70   Pulse 72   Temp 98 °F (36.7 °C) (Oral)   LMP 10/24/2021 (Exact Date)   SpO2 98%   Breastfeeding No       FHT: 115, mod pj, + accels, + intermittent late decelerations resolved with repositioning Cat 2 (reassuring)  CTX: q 2 minutes  SVE:       ASSESSMENT:   37 y.o.  at 40w1d, here for labor    FHT reassuring    Active Hospital Problems    Diagnosis  POA    Normal labor [O80, Z37.9]  Not Applicable    Multigravida of advanced maternal age in third trimester [O09.523]  Unknown    Rh negative, antepartum [O26.899, Z67.91]  Yes      Resolved Hospital Problems   No resolved problems to display.     Labor Course:  0215:   0615:     PLAN:    Continue Close Maternal/Fetal Monitoring  Declines AROM at this time  Recheck 2 hours or PRN    Enid Kirkpatrick M.D.   OB/GYN  PGY-4

## 2022-08-04 NOTE — PROGRESS NOTES
LABOR NOTE    S:  Complaints: No.  Epidural working:  not applicable      O: /69   Pulse 102   Temp 98.4 °F (36.9 °C) (Oral)   LMP 10/24/2021 (Exact Date)   SpO2 98%   Breastfeeding No     FHT: baseline 120, moderate BTBV, + accels, no decels Cat 1 (reassuring)  CTX: q 2-3 minutes  SVE: 8/90/0, declines AROM or pitocin at this time    Labor Course:  0215: 5/80/-2  0615: 6/80/-2  0830: 8/90/-1  1030: 8/90/0, declines AROM    PLAN:  Discussed recommendation of AROM or pitocin as no cervical change over last two hours. Patient declines both at this time but is amenable to AROM at next check if unchanged.   Continue Close Maternal/Fetal Monitoring  Recheck 2 hours or PRN    Queenie Goel MD  PGY-2 OB/GYN

## 2022-08-05 ENCOUNTER — PATIENT MESSAGE (OUTPATIENT)
Dept: OBSTETRICS AND GYNECOLOGY | Facility: CLINIC | Age: 37
End: 2022-08-05
Payer: COMMERCIAL

## 2022-08-05 LAB
ABO + RH BLD: NORMAL
BASOPHILS # BLD AUTO: 0.04 K/UL (ref 0–0.2)
BASOPHILS NFR BLD: 0.3 % (ref 0–1.9)
BLD GP AB SCN CELLS X3 SERPL QL: NORMAL
DIFFERENTIAL METHOD: ABNORMAL
EOSINOPHIL # BLD AUTO: 0.1 K/UL (ref 0–0.5)
EOSINOPHIL NFR BLD: 0.4 % (ref 0–8)
ERYTHROCYTE [DISTWIDTH] IN BLOOD BY AUTOMATED COUNT: 14.1 % (ref 11.5–14.5)
FETAL CELL SCN BLD QL ROSETTE: NORMAL
HCT VFR BLD AUTO: 31.9 % (ref 37–48.5)
HGB BLD-MCNC: 10.4 G/DL (ref 12–16)
IMM GRANULOCYTES # BLD AUTO: 0.04 K/UL (ref 0–0.04)
IMM GRANULOCYTES NFR BLD AUTO: 0.3 % (ref 0–0.5)
INJECT RH IG VOL PATIENT: NORMAL ML
LYMPHOCYTES # BLD AUTO: 1.7 K/UL (ref 1–4.8)
LYMPHOCYTES NFR BLD: 14 % (ref 18–48)
MCH RBC QN AUTO: 27.3 PG (ref 27–31)
MCHC RBC AUTO-ENTMCNC: 32.6 G/DL (ref 32–36)
MCV RBC AUTO: 84 FL (ref 82–98)
MONOCYTES # BLD AUTO: 0.5 K/UL (ref 0.3–1)
MONOCYTES NFR BLD: 3.7 % (ref 4–15)
NEUTROPHILS # BLD AUTO: 9.8 K/UL (ref 1.8–7.7)
NEUTROPHILS NFR BLD: 81.3 % (ref 38–73)
NRBC BLD-RTO: 0 /100 WBC
PLATELET # BLD AUTO: 162 K/UL (ref 150–450)
PMV BLD AUTO: 12.7 FL (ref 9.2–12.9)
RBC # BLD AUTO: 3.81 M/UL (ref 4–5.4)
WBC # BLD AUTO: 12.07 K/UL (ref 3.9–12.7)

## 2022-08-05 PROCEDURE — 63600519 RHOGAM PHARM REV CODE 636 ALT 250 W HCPCS: Performed by: OBSTETRICS & GYNECOLOGY

## 2022-08-05 PROCEDURE — 85025 COMPLETE CBC W/AUTO DIFF WBC: CPT

## 2022-08-05 PROCEDURE — 36415 COLL VENOUS BLD VENIPUNCTURE: CPT

## 2022-08-05 PROCEDURE — 11000001 HC ACUTE MED/SURG PRIVATE ROOM

## 2022-08-05 PROCEDURE — 25000003 PHARM REV CODE 250

## 2022-08-05 PROCEDURE — 36415 COLL VENOUS BLD VENIPUNCTURE: CPT | Performed by: OBSTETRICS & GYNECOLOGY

## 2022-08-05 PROCEDURE — 85461 HEMOGLOBIN FETAL: CPT | Performed by: OBSTETRICS & GYNECOLOGY

## 2022-08-05 PROCEDURE — 86901 BLOOD TYPING SEROLOGIC RH(D): CPT | Performed by: OBSTETRICS & GYNECOLOGY

## 2022-08-05 RX ORDER — OXYCODONE AND ACETAMINOPHEN 5; 325 MG/1; MG/1
1 TABLET ORAL EVERY 4 HOURS PRN
Qty: 10 TABLET | Refills: 0 | Status: SHIPPED | OUTPATIENT
Start: 2022-08-05 | End: 2022-09-14

## 2022-08-05 RX ORDER — IBUPROFEN 800 MG/1
800 TABLET ORAL 3 TIMES DAILY
Qty: 30 TABLET | Refills: 1 | Status: SHIPPED | OUTPATIENT
Start: 2022-08-05 | End: 2022-09-14

## 2022-08-05 RX ADMIN — IBUPROFEN 600 MG: 600 TABLET ORAL at 05:08

## 2022-08-05 RX ADMIN — HUMAN RHO(D) IMMUNE GLOBULIN 300 MCG: 300 INJECTION, SOLUTION INTRAMUSCULAR at 09:08

## 2022-08-05 RX ADMIN — PRENATAL VIT W/ FE FUMARATE-FA TAB 27-0.8 MG 1 TABLET: 27-0.8 TAB at 09:08

## 2022-08-05 RX ADMIN — IBUPROFEN 600 MG: 600 TABLET ORAL at 11:08

## 2022-08-05 RX ADMIN — DOCUSATE SODIUM 200 MG: 100 CAPSULE, LIQUID FILLED ORAL at 09:08

## 2022-08-05 NOTE — PROGRESS NOTES
08/05/22 1115   Breasts WDL   Breast WDL WDL   Maternal Feeding Assessment   Maternal Emotional State assist needed;relaxed   Infant Positioning cross-cradle;cradle  (encouraged cross cradle, pt does crcr then moves arm to cradle)   Signs of Milk Transfer infant jaw motion present   Pain with Feeding no   Comfort Measures Before/During Feeding infant position adjusted;latch adjusted;maternal position adjusted   Latch Assistance yes   Reproductive Interventions   Breast Care: Breastfeeding open to air   Breastfeeding Assistance feeding on demand promoted;feeding cue recognition promoted;assisted with positioning;hand expression verified;infant latch-on verified;feeding session observed;infant suck/swallow verified;support offered   Lactation rounds. Latch assistance provided. Basic education reviewed.

## 2022-08-05 NOTE — LACTATION NOTE
08/05/22 0900   Maternal Infant Feeding   Maternal Emotional State relaxed   Latch Assistance other (see comments)  (to call)   Lactation rounds. Basic education reviewed pt to call for latch assessment.

## 2022-08-05 NOTE — PROGRESS NOTES
POSTPARTUM PROGRESS NOTE    Subjective:     PPD/POD#: 1   Procedure:    EGA: 40w2d   N/V: No   F/C: No   Abd Pain: Mild, well-controlled with oral pain medication   Lochia: Mild   Voiding: Yes   Ambulating: Yes   Bowel fnc: Yes   Breastfeeding: Yes   Contraception: Per primary OB   Circumcision: Declined by Peds     Objective:      Temp:  [97.7 °F (36.5 °C)-98.4 °F (36.9 °C)] 98.4 °F (36.9 °C)  Pulse:  [] 78  Resp:  [16-18] 18  SpO2:  [97 %-98 %] 97 %  BP: ()/(57-83) 94/57    Lung: Normal respiratory effort   Abdomen: Soft, appropriately tender   Uterus: Firm, no fundal tenderness   Incision: N/A   : Deferred   Extremities: No edema     Lab Review    No results for input(s): NA, K, CL, CO2, BUN, CREATININE, GLU, PROT, BILITOT, ALKPHOS, ALT, AST, MG, PHOS in the last 168 hours.    Recent Labs   Lab 22  0252 22  0320   WBC 13.71* 12.07   HGB 12.2 10.4*   HCT 36.4* 31.9*   MCV 82 84    162         I/O    Intake/Output Summary (Last 24 hours) at 2022 0910  Last data filed at 2022 2100  Gross per 24 hour   Intake --   Output 1000 ml   Net -1000 ml        Assessment and Plan:   Postpartum care:  - Patient doing well.  - Continue routine management and advances.  - Circumcision declined by pedi    Rh negative  - Will need postpartum rhogam work up  - Baby: B+        Narcisa Hanna

## 2022-08-05 NOTE — DISCHARGE INSTRUCTIONS
Discharge Instructions    The AAP recommends exclusive breastfeeding for about the first 6 months of age and as solid foods are introduced, continued breastfeeding  for at least 1 year or longer.    Feed the baby at the earliest sign of hunger or comfort (see signs on the back cover of the Mother's Breastfeeding Guide)  Hands to mouth, sucking motions  Rooting or searching for something to suck on  Dont wait for crying - it is a sign of distress    The feedings may be 8-12 times per 24hrs and will not follow a schedule  Avoid pacifiers and bottles for the first 4 weeks  Alternate the breast you start the feeding with, or start with the breast that feels the fullest  Switch breasts when the baby takes himself off the breast or falls asleep  Keep offering breasts until the baby looks full, no longer gives hunger signs, and stays asleep when placed on his back in the crib  If the baby is sleepy and wont wake for a feeding, put the baby skin-to-skin dressed in a diaper against the mothers bare chest  Sleep with your baby in your room near you  The baby should be positioned and latched on to the breast correctly  Chest-to-chest, chin in the breast  Babys lips are flipped outward  Babys mouth is stretched open wide like a shout  Babys sucking should feel like tugging to the mother  The baby should be drinking at the breast:  You should hear swallowing or gulping throughout the feeding  You should see milk on the babys lips when he comes off the breast  Your breasts should be softer when the baby is finished feeding  The baby should look relaxed at the end of feedings  After the 4th day and your milk is in:  The babys poop should turn bright yellow and be loose, watery, and seedy  The baby should have at least 3-4 poops the size of the palm of your hand per day  The baby should have at least 5-6 wet diapers per day  The urine should be light yellow in color  You should drink when you are thirsty and eat a  healthy diet when you are    hungry.     Take naps to get the rest you need.   Take medications and/or drink alcohol only with permission of your obstetrician    or the babys pediatrician.  You can also call the [facility],   ([phone]), Monday-Friday, 8am-5pm Central time, to get the most   up-to-date evidence-based information on the use of medications during   pregnancy and breastfeeding.      Complete the First Alert form in the Mother's Breastfeeding Guide 3-5 days after the baby's birth.  Please call the Breastfeeding Warmline ([phone]) or the baby's pediatrician if you have any concerns.    The baby should be examined by a pediatrician at 3-5 days of age and again at 2 weeks of age.  Once your milk production increases, the baby should be gaining at least ½ - 1oz each day and should be back to birthweight no later than 10-14 days of age.  If this is not the case, please call the Breastfeeding Warmline ([phone]) for assistance and support.    Community Resources    [facility] Breastfeeding Warmline: [phone]   Local [facility] clinics: provide incentives and breastpumps to eligible mothers 4-706-635-BABY  La Leche League International (LLLI):  mother-to-mother support group website        www.lll.[employer]  [facility] La Leche League mother-to-mother support groups:        www.lllInnovate/Protect.[employer]        La Leche League [facility]   Dr. Jan Champion website for latch videos and general information:        www.breastfeedinginc.ca  [facility] is a call center that provides information about the safety of taking medications while breastfeeding.  Call 4-936-880-0636, M-F, 8am-5pm, [place].  International Lactation Consultant Association provides resources for assistance:        www.ilca.org  [place] Breastfeeding Coalition provides informationand resources for parents  and the community    www.LaBreastfeedingSupport.org  [employer]i Yi is a mom-to-mom support group:                              www.nolanesting.com//breastfeedng-support/  [employer] Healthy Babies:  5-864-070-BABY(5989)  [employer]: a breastfeeding support group for women of color, [phone]

## 2022-08-06 VITALS
TEMPERATURE: 97 F | RESPIRATION RATE: 18 BRPM | HEART RATE: 73 BPM | SYSTOLIC BLOOD PRESSURE: 96 MMHG | DIASTOLIC BLOOD PRESSURE: 64 MMHG | OXYGEN SATURATION: 98 %

## 2022-08-06 PROCEDURE — 25000003 PHARM REV CODE 250

## 2022-08-06 RX ADMIN — IBUPROFEN 600 MG: 600 TABLET ORAL at 06:08

## 2022-08-06 RX ADMIN — PRENATAL VIT W/ FE FUMARATE-FA TAB 27-0.8 MG 1 TABLET: 27-0.8 TAB at 08:08

## 2022-08-06 RX ADMIN — IBUPROFEN 600 MG: 600 TABLET ORAL at 11:08

## 2022-08-06 RX ADMIN — IBUPROFEN 600 MG: 600 TABLET ORAL at 12:08

## 2022-08-06 RX ADMIN — DOCUSATE SODIUM 200 MG: 100 CAPSULE, LIQUID FILLED ORAL at 08:08

## 2022-08-06 NOTE — PROGRESS NOTES
POSTPARTUM PROGRESS NOTE    Subjective:     PPD/POD#: 2   Procedure:    EGA: 40w2d   N/V: No   F/C: No   Abd Pain: Mild, well-controlled with oral pain medication   Lochia: Mild   Voiding: Yes   Ambulating: Yes   Bowel fnc: Yes   Breastfeeding: Yes   Contraception: Per primary OB   Circumcision: Declined by Peds     Objective:      Temp:  [97.4 °F (36.3 °C)-98.3 °F (36.8 °C)] 97.4 °F (36.3 °C)  Pulse:  [73-88] 73  Resp:  [18] 18  SpO2:  [96 %-98 %] 98 %  BP: (96-98)/(52-64) 96/64    Lung: Normal respiratory effort   Abdomen: Soft, appropriately tender   Uterus: Firm, no fundal tenderness   Incision: N/A   : Deferred   Extremities: No edema     Lab Review    No results for input(s): NA, K, CL, CO2, BUN, CREATININE, GLU, PROT, BILITOT, ALKPHOS, ALT, AST, MG, PHOS in the last 168 hours.    Recent Labs   Lab 22  0252 22  0320   WBC 13.71* 12.07   HGB 12.2 10.4*   HCT 36.4* 31.9*   MCV 82 84    162         I/O  No intake or output data in the 24 hours ending 22 0945     Assessment and Plan:   Postpartum care:  - Patient doing well.  - Continue routine management and advances.  - Circumcision declined by pedi    Rh negative  - Will need postpartum rhogam work up  - Baby: B+    Plan for DC home today.    Ligia Membreno

## 2022-08-06 NOTE — LACTATION NOTE
Pt shared that baby is sleepy, but feels baby is nursing well. Baby skin to skin when LC entered. No feeding cues present at this time. Lactation discharge education completed. Plan of care is for pt to follow basic breastfeeding education, frequent feeding based on baby's cues, and to monitor baby's voids and stools. Breastfeeding guide, including First Alert survey, resource list, and lactation warmline phone number reviewed. Pt to notify doctor for maternal or infant concerns, as reviewed with LC. Pt verbalizes understanding and questions answered.

## 2022-08-06 NOTE — DISCHARGE SUMMARY
Delivery Discharge Summary  Obstetrics      Primary OB Clinician: Lyssa Martinez MD    Admission date: 2022  Discharge date: 2022    Disposition: To home, self care    Admit Dx:      Patient Active Problem List   Diagnosis    Rh negative, antepartum     (spontaneous vaginal delivery)     Discharge Dx:    Patient Active Problem List   Diagnosis    Rh negative, antepartum     (spontaneous vaginal delivery)       Procedure:     Hospital Course:  Nallely Liang is a 37 y.o. now , PPD #2 who was admitted on 2022 for labor. On initial assessment, vital signs were stable and physical exam was normal. Infant was in cephalic presentation. Patient was subsequently admitted to labor and delivery unit with signed consents.  Labor course was uncomplicated.  Patient delivered a single viable  male. Please see delivery note for further details. Pt was in stable condition post delivery and was transferred to the Mother-Baby Unit. Her postpartum course was uncomplicated. On discharge day, patient's pain is controlled with oral pain medications. Pt is tolerating ambulation without SOB or CP, and PO diet without N/V. Reports lochia is mild. Denies any HA, vision changes, F/C, LE swelling. Denies any breast pain/soreness.  Pt in stable condition and ready for discharge. She has been instructed to continue as indicated as well as pain medications as needed and to follow up in the OB clinic in 6 weeks with her obstetrics provider.    Pertinent studies:  Postpartum CBC  Lab Results   Component Value Date    WBC 12.07 2022    HGB 10.4 (L) 2022    HCT 31.9 (L) 2022    MCV 84 2022     2022         Tubal Ligation: n/a  Feeding Method: breast  Rh Immune Globulin Given(O NEG): 2022  Rubella Vaccine Given: N/A  Tdap Vaccine Given: N/A    Delivery:    Episiotomy: None   Lacerations: 1st   Repair suture:     Repair # of packets: 1   Blood loss (ml):        Birth information:  YOB: 2021   Time of birth: 3:09 PM   Sex: male   Delivery type: Vaginal, Spontaneous   Gestational Age: 40w1d    Delivery Clinician:      Other providers:       Additional  information:  Forceps:    Vacuum:    Breech:    Observed anomalies      Living?:           APGARS  One minute Five minutes Ten minutes   Skin color:         Heart rate:         Grimace:         Muscle tone:         Breathing:         Totals: 9  9        Placenta: Delivered:       appearance      Patient Instructions:   Current Discharge Medication List      START taking these medications    Details   ibuprofen (ADVIL,MOTRIN) 800 MG tablet Take 1 tablet (800 mg total) by mouth 3 (three) times daily.  Qty: 30 tablet, Refills: 1      oxyCODONE-acetaminophen (PERCOCET) 5-325 mg per tablet Take 1 tablet by mouth every 4 (four) hours as needed for Pain.  Qty: 10 tablet, Refills: 0         CONTINUE these medications which have NOT CHANGED    Details   fluticasone propionate (FLONASE) 50 mcg/actuation nasal spray 1 spray (50 mcg total) by Each Nostril route once daily.  Qty: 9.9 g, Refills: 3    Associated Diagnoses: Acute non-recurrent maxillary sinusitis      iron bis-gly/FA/C/B12/Ca/succ (IRON 21/7 ORAL) Take by mouth.      prenatal vit calc,iron,folic (PRENATAL VITAMIN ORAL) Take by mouth.             Discharge Procedure Orders   Notify your health care provider if you experience any of the following:  redness, tenderness, or signs of infection (pain, swelling, redness, odor or green/yellow discharge around incision site)     Notify your health care provider if you experience any of the following:  severe uncontrolled pain     Notify your health care provider if you experience any of the following:  persistent nausea and vomiting or diarrhea     Notify your health care provider if you experience any of the following:  temperature >100.4       Ligiaaldo Membreno MD  Obstetrics & Gynecology

## 2022-08-25 ENCOUNTER — PATIENT MESSAGE (OUTPATIENT)
Dept: OBSTETRICS AND GYNECOLOGY | Facility: CLINIC | Age: 37
End: 2022-08-25
Payer: COMMERCIAL

## 2022-08-29 ENCOUNTER — OFFICE VISIT (OUTPATIENT)
Dept: OPTOMETRY | Facility: CLINIC | Age: 37
End: 2022-08-29
Payer: COMMERCIAL

## 2022-08-29 DIAGNOSIS — H52.13 MYOPIA, BILATERAL: Primary | ICD-10-CM

## 2022-08-29 PROCEDURE — 99499 UNLISTED E&M SERVICE: CPT | Mod: S$GLB,,, | Performed by: OPTOMETRIST

## 2022-08-29 PROCEDURE — 99999 PR PBB SHADOW E&M-EST. PATIENT-LVL II: ICD-10-PCS | Mod: PBBFAC,,, | Performed by: OPTOMETRIST

## 2022-08-29 PROCEDURE — 1159F PR MEDICATION LIST DOCUMENTED IN MEDICAL RECORD: ICD-10-PCS | Mod: CPTII,S$GLB,, | Performed by: OPTOMETRIST

## 2022-08-29 PROCEDURE — 99499 NO LOS: ICD-10-PCS | Mod: S$GLB,,, | Performed by: OPTOMETRIST

## 2022-08-29 PROCEDURE — 1159F MED LIST DOCD IN RCRD: CPT | Mod: CPTII,S$GLB,, | Performed by: OPTOMETRIST

## 2022-08-29 PROCEDURE — 1160F RVW MEDS BY RX/DR IN RCRD: CPT | Mod: CPTII,S$GLB,, | Performed by: OPTOMETRIST

## 2022-08-29 PROCEDURE — 1160F PR REVIEW ALL MEDS BY PRESCRIBER/CLIN PHARMACIST DOCUMENTED: ICD-10-PCS | Mod: CPTII,S$GLB,, | Performed by: OPTOMETRIST

## 2022-08-29 PROCEDURE — 99999 PR PBB SHADOW E&M-EST. PATIENT-LVL II: CPT | Mod: PBBFAC,,, | Performed by: OPTOMETRIST

## 2022-08-29 NOTE — PROGRESS NOTES
HPI    X 1 keke since giving birth to 3rd child  Last edited by Sherine Abad, OD on 8/29/2022 12:39 PM.            Assessment /Plan     For exam results, see Encounter Report.    Myopia, bilateral      Rx change update spec and CL rx

## 2022-08-30 ENCOUNTER — PATIENT MESSAGE (OUTPATIENT)
Dept: OPTOMETRY | Facility: CLINIC | Age: 37
End: 2022-08-30
Payer: COMMERCIAL

## 2022-09-14 ENCOUNTER — PROCEDURE VISIT (OUTPATIENT)
Dept: OBSTETRICS AND GYNECOLOGY | Facility: CLINIC | Age: 37
End: 2022-09-14
Payer: COMMERCIAL

## 2022-09-14 VITALS
HEIGHT: 64 IN | BODY MASS INDEX: 23.02 KG/M2 | SYSTOLIC BLOOD PRESSURE: 100 MMHG | DIASTOLIC BLOOD PRESSURE: 62 MMHG | WEIGHT: 134.81 LBS

## 2022-09-14 DIAGNOSIS — Z30.430 ENCOUNTER FOR IUD INSERTION: ICD-10-CM

## 2022-09-14 PROCEDURE — 58300 INSERTION OF INTRAUTERINE DEVICE: ICD-10-PCS | Mod: S$GLB,,, | Performed by: OBSTETRICS & GYNECOLOGY

## 2022-09-14 PROCEDURE — 0503F PR POSTPARTUM CARE VISIT: ICD-10-PCS | Mod: CPTII,S$GLB,, | Performed by: OBSTETRICS & GYNECOLOGY

## 2022-09-14 PROCEDURE — 58300 INSERT INTRAUTERINE DEVICE: CPT | Mod: S$GLB,,, | Performed by: OBSTETRICS & GYNECOLOGY

## 2022-09-14 PROCEDURE — 0503F POSTPARTUM CARE VISIT: CPT | Mod: CPTII,S$GLB,, | Performed by: OBSTETRICS & GYNECOLOGY

## 2022-09-14 NOTE — PROGRESS NOTES
"  Subjective:      Nallely Liang is a 37 y.o.  who presents for a postpartum visit.  She is status post  uncomplicated vaginal delivery 6 weeks ago.  Her hospitalization was not complicated.  She is breastfeeding.  She desires Mirena for contraception.  She denies signs and symptoms of postpartum depression.    Her last pap was NILM, HPV neg in 2019     Review the Delivery Report for details.     Objective:     /62 (BP Location: Left arm, Patient Position: Sitting)   Ht 5' 4" (1.626 m)   Wt 61.1 kg (134 lb 13 oz)   LMP 10/24/2021 (Exact Date)   Breastfeeding Yes   BMI 23.14 kg/m²     General: healthy, no distress  Abdomen: Normal, benign.  External Genitalia: normal, well-healed, without lesions or masses  Vagina: normal, well-healed, physiologic discharge. Pinpoint area of granulation tissue that patient request silver nitrate application for. Silver nitrate applied.    Assessment:     Postpartum care and examination    Encounter for IUD insertion  -     Insertion of Intrauterine Device      Plan:     Moving to [place] - will establish with a new OB for string check and annual exam. Precautions given.    "

## 2022-09-14 NOTE — PROCEDURES
Insertion of Intrauterine Device    Date/Time: 9/14/2022 3:15 PM  Performed by: Lyssa Martinez MD  Authorized by: Lyssa Martinez MD     Consent:     Consent obtained:  Written    Consent given by:  Patient    Procedure risks and benefits discussed: yes      Patient questions answered: yes      Patient agrees, verbalizes understanding, and wants to proceed: yes    Procedure:     Pelvic exam performed: yes      Negative urine pregnancy test: yes      Cervix cleaned and prepped: yes      Speculum placed in vagina: yes      Tenaculum applied to cervix: yes      Uterus sounded: yes      Uterus sound depth (cm):  7    IUD inserted with no complications: yes      Strings trimmed: yes    1 Intra Uterine Device levonorgestreL 20 mcg/24 hours (7 yrs) 52 mg     Post-procedure:     Patient tolerated procedure well: yes      Patient will follow up after next period: yes

## 2024-07-27 NOTE — PROGRESS NOTES
Contact lens exam done, see exam of same date for documentation.   
Bed/Stretcher in lowest position, wheels locked, appropriate side rails in place/Call bell, personal items and telephone in reach/Instruct patient to call for assistance before getting out of bed/chair/stretcher/Non-slip footwear applied when patient is off stretcher/[place] to call system/Physically safe environment - no spills, clutter or unnecessary equipment/Purposeful proactive rounding/Room/bathroom lighting operational, light cord in reach